# Patient Record
Sex: MALE | Race: BLACK OR AFRICAN AMERICAN | NOT HISPANIC OR LATINO | Employment: UNEMPLOYED | RURAL
[De-identification: names, ages, dates, MRNs, and addresses within clinical notes are randomized per-mention and may not be internally consistent; named-entity substitution may affect disease eponyms.]

---

## 2017-05-30 ENCOUNTER — HISTORICAL (OUTPATIENT)
Dept: ADMINISTRATIVE | Facility: HOSPITAL | Age: 52
End: 2017-05-30

## 2017-05-31 LAB
LAB AP CLINICAL INFORMATION: NORMAL
LAB AP DIAGNOSIS - HISTORICAL: NORMAL
LAB AP GROSS PATHOLOGY - HISTORICAL: NORMAL
LAB AP SPECIMEN SUBMITTED - HISTORICAL: NORMAL

## 2017-08-24 ENCOUNTER — HISTORICAL (OUTPATIENT)
Dept: ADMINISTRATIVE | Facility: HOSPITAL | Age: 52
End: 2017-08-24

## 2021-12-06 ENCOUNTER — OFFICE VISIT (OUTPATIENT)
Dept: FAMILY MEDICINE | Facility: CLINIC | Age: 56
End: 2021-12-06
Payer: COMMERCIAL

## 2021-12-06 VITALS
HEIGHT: 68 IN | BODY MASS INDEX: 32.64 KG/M2 | TEMPERATURE: 98 F | WEIGHT: 215.38 LBS | SYSTOLIC BLOOD PRESSURE: 202 MMHG | DIASTOLIC BLOOD PRESSURE: 114 MMHG | OXYGEN SATURATION: 98 % | HEART RATE: 100 BPM

## 2021-12-06 DIAGNOSIS — Z83.3 FAMILY HISTORY OF DIABETES MELLITUS IN MOTHER: ICD-10-CM

## 2021-12-06 DIAGNOSIS — I10 PRIMARY HYPERTENSION: Primary | ICD-10-CM

## 2021-12-06 DIAGNOSIS — Z86.19 HX OF HEPATITIS C: Chronic | ICD-10-CM

## 2021-12-06 DIAGNOSIS — Z29.9 PREVENTIVE MEASURE: ICD-10-CM

## 2021-12-06 DIAGNOSIS — Z86.19 HISTORY OF HEPATITIS C: ICD-10-CM

## 2021-12-06 DIAGNOSIS — E66.9 OBESITY (BMI 30-39.9): Chronic | ICD-10-CM

## 2021-12-06 PROCEDURE — 99214 PR OFFICE/OUTPT VISIT, EST, LEVL IV, 30-39 MIN: ICD-10-PCS | Mod: GC,,, | Performed by: FAMILY MEDICINE

## 2021-12-06 PROCEDURE — 99214 OFFICE O/P EST MOD 30 MIN: CPT | Mod: GC,,, | Performed by: FAMILY MEDICINE

## 2021-12-06 RX ORDER — AMLODIPINE BESYLATE 5 MG/1
5 TABLET ORAL DAILY
Qty: 30 TABLET | Refills: 0 | Status: SHIPPED | OUTPATIENT
Start: 2021-12-06 | End: 2022-01-05

## 2021-12-06 RX ORDER — HYDROCHLOROTHIAZIDE 25 MG/1
25 TABLET ORAL DAILY
Qty: 30 TABLET | Refills: 0 | Status: SHIPPED | OUTPATIENT
Start: 2021-12-06 | End: 2021-12-27 | Stop reason: SDUPTHER

## 2021-12-06 RX ORDER — MULTIVITAMIN
1 TABLET ORAL DAILY
COMMUNITY
End: 2023-07-17 | Stop reason: SDUPTHER

## 2021-12-07 PROBLEM — E66.9 OBESITY (BMI 30-39.9): Chronic | Status: ACTIVE | Noted: 2021-12-07

## 2021-12-07 PROBLEM — Z29.9 PREVENTIVE MEASURE: Status: ACTIVE | Noted: 2021-12-07

## 2021-12-07 PROBLEM — I10 PRIMARY HYPERTENSION: Chronic | Status: ACTIVE | Noted: 2021-12-07

## 2021-12-07 PROBLEM — Z86.19 HX OF HEPATITIS C: Chronic | Status: ACTIVE | Noted: 2021-12-07

## 2021-12-13 ENCOUNTER — OFFICE VISIT (OUTPATIENT)
Dept: FAMILY MEDICINE | Facility: CLINIC | Age: 56
End: 2021-12-13
Payer: COMMERCIAL

## 2021-12-13 VITALS
OXYGEN SATURATION: 99 % | WEIGHT: 213 LBS | HEART RATE: 120 BPM | BODY MASS INDEX: 32.28 KG/M2 | RESPIRATION RATE: 20 BRPM | HEIGHT: 68 IN | TEMPERATURE: 98 F | SYSTOLIC BLOOD PRESSURE: 188 MMHG | DIASTOLIC BLOOD PRESSURE: 104 MMHG

## 2021-12-13 DIAGNOSIS — Z29.9 PREVENTIVE MEASURE: ICD-10-CM

## 2021-12-13 DIAGNOSIS — I10 PRIMARY HYPERTENSION: Primary | ICD-10-CM

## 2021-12-13 DIAGNOSIS — E78.00 HYPERCHOLESTEROLEMIA: ICD-10-CM

## 2021-12-13 DIAGNOSIS — Z86.19 HX OF HEPATITIS C: Chronic | ICD-10-CM

## 2021-12-13 DIAGNOSIS — E66.9 OBESITY (BMI 30-39.9): Chronic | ICD-10-CM

## 2021-12-13 PROCEDURE — 99214 PR OFFICE/OUTPT VISIT, EST, LEVL IV, 30-39 MIN: ICD-10-PCS | Mod: GC,,, | Performed by: FAMILY MEDICINE

## 2021-12-13 PROCEDURE — 99214 OFFICE O/P EST MOD 30 MIN: CPT | Mod: GC,,, | Performed by: FAMILY MEDICINE

## 2021-12-13 RX ORDER — AMLODIPINE BESYLATE 10 MG/1
10 TABLET ORAL DAILY
Qty: 30 TABLET | Refills: 0 | Status: SHIPPED | OUTPATIENT
Start: 2021-12-21 | End: 2022-01-20

## 2021-12-13 RX ORDER — ATORVASTATIN CALCIUM 10 MG/1
10 TABLET, FILM COATED ORAL NIGHTLY
Qty: 30 TABLET | Refills: 0 | Status: SHIPPED | OUTPATIENT
Start: 2021-12-13 | End: 2022-01-12

## 2021-12-27 ENCOUNTER — OFFICE VISIT (OUTPATIENT)
Dept: FAMILY MEDICINE | Facility: CLINIC | Age: 56
End: 2021-12-27
Payer: COMMERCIAL

## 2021-12-27 VITALS
TEMPERATURE: 98 F | BODY MASS INDEX: 32.54 KG/M2 | WEIGHT: 214 LBS | OXYGEN SATURATION: 98 % | HEART RATE: 107 BPM | DIASTOLIC BLOOD PRESSURE: 98 MMHG | SYSTOLIC BLOOD PRESSURE: 169 MMHG

## 2021-12-27 DIAGNOSIS — Z29.9 PREVENTIVE MEASURE: ICD-10-CM

## 2021-12-27 DIAGNOSIS — I10 PRIMARY HYPERTENSION: ICD-10-CM

## 2021-12-27 DIAGNOSIS — Z86.19 HX OF HEPATITIS C: Chronic | ICD-10-CM

## 2021-12-27 DIAGNOSIS — E78.00 HYPERCHOLESTEROLEMIA: Primary | ICD-10-CM

## 2021-12-27 DIAGNOSIS — E66.9 OBESITY (BMI 30-39.9): Chronic | ICD-10-CM

## 2021-12-27 PROCEDURE — 99214 OFFICE O/P EST MOD 30 MIN: CPT | Mod: GC,,, | Performed by: FAMILY MEDICINE

## 2021-12-27 PROCEDURE — 99214 PR OFFICE/OUTPT VISIT, EST, LEVL IV, 30-39 MIN: ICD-10-PCS | Mod: GC,,, | Performed by: FAMILY MEDICINE

## 2021-12-27 RX ORDER — AMLODIPINE BESYLATE 10 MG/1
10 TABLET ORAL DAILY
Qty: 30 TABLET | Refills: 11 | Status: SHIPPED | OUTPATIENT
Start: 2022-01-07 | End: 2022-01-24 | Stop reason: SDUPTHER

## 2021-12-27 RX ORDER — ATORVASTATIN CALCIUM 20 MG/1
20 TABLET, FILM COATED ORAL DAILY
Qty: 30 TABLET | Refills: 0 | Status: SHIPPED | OUTPATIENT
Start: 2022-01-12 | End: 2022-01-24 | Stop reason: SDUPTHER

## 2021-12-27 RX ORDER — HYDROCHLOROTHIAZIDE 25 MG/1
25 TABLET ORAL DAILY
Qty: 30 TABLET | Refills: 0 | Status: SHIPPED | OUTPATIENT
Start: 2022-01-07 | End: 2022-01-24 | Stop reason: SDUPTHER

## 2022-01-24 ENCOUNTER — OFFICE VISIT (OUTPATIENT)
Dept: FAMILY MEDICINE | Facility: CLINIC | Age: 57
End: 2022-01-24
Payer: COMMERCIAL

## 2022-01-24 VITALS
HEART RATE: 103 BPM | TEMPERATURE: 98 F | RESPIRATION RATE: 20 BRPM | OXYGEN SATURATION: 98 % | WEIGHT: 215 LBS | HEIGHT: 68 IN | SYSTOLIC BLOOD PRESSURE: 179 MMHG | DIASTOLIC BLOOD PRESSURE: 95 MMHG | BODY MASS INDEX: 32.58 KG/M2

## 2022-01-24 DIAGNOSIS — E66.9 OBESITY (BMI 30-39.9): Chronic | ICD-10-CM

## 2022-01-24 DIAGNOSIS — E78.00 HYPERCHOLESTEROLEMIA: Chronic | ICD-10-CM

## 2022-01-24 DIAGNOSIS — Z29.9 PREVENTIVE MEASURE: ICD-10-CM

## 2022-01-24 DIAGNOSIS — I10 PRIMARY HYPERTENSION: Primary | Chronic | ICD-10-CM

## 2022-01-24 PROCEDURE — 99204 PR OFFICE/OUTPT VISIT, NEW, LEVL IV, 45-59 MIN: ICD-10-PCS | Mod: GC,,, | Performed by: SPECIALIST

## 2022-01-24 PROCEDURE — 99204 OFFICE O/P NEW MOD 45 MIN: CPT | Mod: GC,,, | Performed by: SPECIALIST

## 2022-01-24 RX ORDER — ATORVASTATIN CALCIUM 20 MG/1
20 TABLET, FILM COATED ORAL DAILY
Qty: 30 TABLET | Refills: 2 | Status: SHIPPED | OUTPATIENT
Start: 2022-01-24 | End: 2022-04-18 | Stop reason: SDUPTHER

## 2022-01-24 RX ORDER — HYDROCHLOROTHIAZIDE 25 MG/1
25 TABLET ORAL DAILY
Qty: 30 TABLET | Refills: 2 | Status: SHIPPED | OUTPATIENT
Start: 2022-01-24 | End: 2022-04-18 | Stop reason: DRUGHIGH

## 2022-01-24 RX ORDER — AMLODIPINE BESYLATE 10 MG/1
10 TABLET ORAL DAILY
Qty: 30 TABLET | Refills: 2 | Status: SHIPPED | OUTPATIENT
Start: 2022-01-24 | End: 2022-04-18 | Stop reason: SDUPTHER

## 2022-01-29 NOTE — PROGRESS NOTES
Subjective:       Patient ID: Horacio Coello is a 56 y.o. male.    Chief Complaint: Follow-up (1 month)    HPI     A 57 yo AA male with past medical history obesity, hypertension and hep C infection s/p treatment 4 years ago who presents to the clinic for four-week follow up. Blood pressure measured at the office is 179/95 which was rechecked at 149/91 a few minutes later, which is consistent with home BP log which shows averaging BP of 145/85. We have checked his home blood pressure machine, which shows accurate BP measurement as compared to our machine in the office. Patient has hx of white coat syndrome and always has elevated BP measurement when coming to the office.. He is doing good and denies headache, blurry vision, chest pain, shortness of breath, abdominal pain, numbness, tingling and weakness. We increased atorvastatin to 20 mg in the last visit. We will refill atorvastain, HCTZ and amlodipine and follow up in three months.  We will repeat CMP in next visit because of elevated total protein and globulin in last lab      Current Outpatient Medications:     multivitamin (THERAGRAN) per tablet, Take 1 tablet by mouth once daily., Disp: , Rfl:     amLODIPine (NORVASC) 10 MG tablet, Take 1 tablet (10 mg total) by mouth once daily., Disp: 30 tablet, Rfl: 2    atorvastatin (LIPITOR) 20 MG tablet, Take 1 tablet (20 mg total) by mouth once daily., Disp: 30 tablet, Rfl: 2    hydroCHLOROthiazide (HYDRODIURIL) 25 MG tablet, Take 1 tablet (25 mg total) by mouth once daily., Disp: 30 tablet, Rfl: 2    Review of patient's allergies indicates:  No Known Allergies    Past Medical History:   Diagnosis Date    Hepatitis C infection 4- 6 years ago    Hypertension     Obesity        History reviewed. No pertinent surgical history.    Family History   Problem Relation Age of Onset    Diabetes Mother     Hypertension Father         Also Gout       Social History     Tobacco Use    Smoking status: Current Every Day  "Smoker     Packs/day: 1.00     Years: 20.00     Pack years: 20.00     Types: Cigarettes    Smokeless tobacco: Never Used   Substance Use Topics    Alcohol use: Not Currently       Review of Systems   Constitutional: Negative for chills, fatigue and fever.   HENT: Negative for nasal congestion, sore throat and trouble swallowing.    Eyes: Negative for pain, redness and visual disturbance.   Respiratory: Negative for cough, chest tightness and shortness of breath.    Cardiovascular: Negative for chest pain and palpitations.   Gastrointestinal: Negative for abdominal pain, blood in stool, constipation, diarrhea, nausea and vomiting.   Genitourinary: Negative for dysuria and hematuria.   Musculoskeletal: Negative for joint swelling, neck pain and neck stiffness.   Integumentary:  Negative for rash and wound.   Neurological: Negative for dizziness, vertigo, tremors, seizures, syncope and weakness.   Hematological: Negative for adenopathy.   Psychiatric/Behavioral: Negative for agitation, behavioral problems and confusion.           Objective:      Vitals:    01/24/22 1531   BP: (!) 179/95   BP Location: Right arm   Patient Position: Sitting   Pulse: 103   Resp: 20   Temp: 97.7 °F (36.5 °C)   TempSrc: Temporal   SpO2: 98%   Weight: 97.5 kg (215 lb)   Height: 5' 8" (1.727 m)     Physical Exam  Vitals and nursing note reviewed.   Constitutional:       General: He is not in acute distress.     Appearance: He is obese. He is not ill-appearing, toxic-appearing or diaphoretic.   HENT:      Head: Normocephalic.      Right Ear: External ear normal. There is no impacted cerumen.      Left Ear: External ear normal. There is no impacted cerumen.      Nose: Nose normal. No congestion or rhinorrhea.      Mouth/Throat:      Mouth: Mucous membranes are moist.      Pharynx: Oropharynx is clear. No oropharyngeal exudate or posterior oropharyngeal erythema.   Eyes:      General:         Right eye: No discharge.         Left eye: No " discharge.      Extraocular Movements: Extraocular movements intact.      Conjunctiva/sclera: Conjunctivae normal.      Pupils: Pupils are equal, round, and reactive to light.   Cardiovascular:      Rate and Rhythm: Normal rate and regular rhythm.      Pulses: Normal pulses.      Heart sounds: Normal heart sounds. No murmur heard.  No friction rub. No gallop.    Pulmonary:      Effort: Pulmonary effort is normal. No respiratory distress.      Breath sounds: No wheezing or rhonchi.   Abdominal:      Palpations: Abdomen is soft.      Tenderness: There is no abdominal tenderness. There is no guarding or rebound.   Musculoskeletal:         General: No swelling. Normal range of motion.      Cervical back: No tenderness.      Right lower leg: No edema.      Left lower leg: No edema.   Lymphadenopathy:      Cervical: No cervical adenopathy.   Skin:     General: Skin is warm.      Coloration: Skin is not jaundiced.   Neurological:      General: No focal deficit present.      Mental Status: He is alert and oriented to person, place, and time. Mental status is at baseline.   Psychiatric:         Mood and Affect: Mood normal.         Behavior: Behavior normal.         Thought Content: Thought content normal.         Judgment: Judgment normal.           Lab Results   Component Value Date    WBC 10.54 12/07/2021    HGB 17.0 12/07/2021    HCT 51.1 12/07/2021     12/07/2021    CHOL 211 (H) 12/07/2021    TRIG 130 12/07/2021    HDL 34 (L) 12/07/2021    ALT 29 12/07/2021    AST 17 12/07/2021     12/07/2021    K 4.8 12/07/2021     12/07/2021    CREATININE 1.14 12/07/2021    BUN 11 12/07/2021    CO2 31 12/07/2021    HGBA1C 5.6 12/07/2021      Assessment:       1. Primary hypertension    2. Hypercholesterolemia    3. Obesity (BMI 30-39.9)    4. Preventive measure        Plan:         Problem List Items Addressed This Visit        Cardiac/Vascular    Primary hypertension - Primary (Chronic)     Refill amlodipine and  HCTZ  for 30 days with 2 refills  Continue follow lifestyle modifications  Follow up in three months         Relevant Medications    hydroCHLOROthiazide (HYDRODIURIL) 25 MG tablet    atorvastatin (LIPITOR) 20 MG tablet    amLODIPine (NORVASC) 10 MG tablet    Hypercholesterolemia     Refill atorvastain 20 mg daily for 30 days with 2 refills  Follow up in three months         Relevant Medications    atorvastatin (LIPITOR) 20 MG tablet       Endocrine    Obesity (BMI 30-39.9) (Chronic)     Continue lifestyle modification through diet changes and exercise at least 30 minutes and 5 days a week  Follow up in three month             Other    Preventive measure     Will perform required preventive test in next visit as indicated  Repeat CMP in next visit                 Follow up in about 3 months (around 4/24/2022).    Sidney Alves MD

## 2022-01-29 NOTE — ASSESSMENT & PLAN NOTE
Refill amlodipine and HCTZ  for 30 days with 2 refills  Continue follow lifestyle modifications  Follow up in three months

## 2022-01-29 NOTE — ASSESSMENT & PLAN NOTE
Continue lifestyle modification through diet changes and exercise at least 30 minutes and 5 days a week  Follow up in three month

## 2022-04-18 ENCOUNTER — OFFICE VISIT (OUTPATIENT)
Dept: FAMILY MEDICINE | Facility: CLINIC | Age: 57
End: 2022-04-18
Payer: COMMERCIAL

## 2022-04-18 VITALS
OXYGEN SATURATION: 97 % | HEIGHT: 68 IN | DIASTOLIC BLOOD PRESSURE: 105 MMHG | BODY MASS INDEX: 32.74 KG/M2 | WEIGHT: 216 LBS | TEMPERATURE: 98 F | RESPIRATION RATE: 18 BRPM | SYSTOLIC BLOOD PRESSURE: 173 MMHG | HEART RATE: 114 BPM

## 2022-04-18 DIAGNOSIS — E78.00 HYPERCHOLESTEROLEMIA: Chronic | ICD-10-CM

## 2022-04-18 DIAGNOSIS — R00.0 TACHYCARDIA: ICD-10-CM

## 2022-04-18 DIAGNOSIS — Z76.0 MEDICATION REFILL: ICD-10-CM

## 2022-04-18 DIAGNOSIS — F17.200 CURRENT SMOKER: ICD-10-CM

## 2022-04-18 DIAGNOSIS — I10 ESSENTIAL HYPERTENSION: Primary | ICD-10-CM

## 2022-04-18 DIAGNOSIS — Z71.6 ENCOUNTER FOR SMOKING CESSATION COUNSELING: ICD-10-CM

## 2022-04-18 DIAGNOSIS — I10 PRIMARY HYPERTENSION: Chronic | ICD-10-CM

## 2022-04-18 LAB
EKG 12-LEAD: NORMAL
PR INTERVAL: NORMAL
PRT AXES: NORMAL
QRS DURATION: NORMAL
QT/QTC: NORMAL
VENTRICULAR RATE: NORMAL

## 2022-04-18 PROCEDURE — 99214 OFFICE O/P EST MOD 30 MIN: CPT | Mod: ,,, | Performed by: FAMILY MEDICINE

## 2022-04-18 PROCEDURE — 93000 ELECTROCARDIOGRAM COMPLETE: CPT | Mod: ,,, | Performed by: FAMILY MEDICINE

## 2022-04-18 PROCEDURE — 99214 PR OFFICE/OUTPT VISIT, EST, LEVL IV, 30-39 MIN: ICD-10-PCS | Mod: ,,, | Performed by: FAMILY MEDICINE

## 2022-04-18 PROCEDURE — 93000 POCT EKG 12-LEAD: ICD-10-PCS | Mod: ,,, | Performed by: FAMILY MEDICINE

## 2022-04-18 RX ORDER — VARENICLINE TARTRATE 1 MG/1
1 TABLET, FILM COATED ORAL 2 TIMES DAILY
Qty: 120 TABLET | Refills: 0 | Status: SHIPPED | OUTPATIENT
Start: 2022-04-18 | End: 2022-06-17

## 2022-04-18 RX ORDER — BUPROPION HYDROCHLORIDE 150 MG/1
150 TABLET, EXTENDED RELEASE ORAL 2 TIMES DAILY
Qty: 60 TABLET | Refills: 2 | Status: SHIPPED | OUTPATIENT
Start: 2022-04-18 | End: 2022-07-18

## 2022-04-18 RX ORDER — ATORVASTATIN CALCIUM 20 MG/1
20 TABLET, FILM COATED ORAL DAILY
Qty: 90 TABLET | Refills: 3 | Status: SHIPPED | OUTPATIENT
Start: 2022-04-18 | End: 2022-05-12 | Stop reason: SDUPTHER

## 2022-04-18 RX ORDER — VARENICLINE TARTRATE 0.5 (11)-1
KIT ORAL
Qty: 1 EACH | Refills: 0 | Status: SHIPPED | OUTPATIENT
Start: 2022-04-18 | End: 2022-10-17

## 2022-04-18 RX ORDER — HYDROCHLOROTHIAZIDE 25 MG/1
25 TABLET ORAL 2 TIMES DAILY
Qty: 180 TABLET | Refills: 0 | Status: SHIPPED | OUTPATIENT
Start: 2022-04-18 | End: 2022-07-18 | Stop reason: SDUPTHER

## 2022-04-18 RX ORDER — AMLODIPINE BESYLATE 10 MG/1
10 TABLET ORAL DAILY
Qty: 90 TABLET | Refills: 3 | Status: SHIPPED | OUTPATIENT
Start: 2022-04-18 | End: 2022-05-12 | Stop reason: SDUPTHER

## 2022-04-18 NOTE — PROGRESS NOTES
Subjective:       Patient ID: Horacio Coello is a 56 y.o. male.    Chief Complaint: Hypertension (F/u HTN) and Medication Refill    Pt presents for 3 month follow up on hypertension. BP today is still elevated at 154/70 on manual recheck. He denies any CP, SOB, LE edema. I will continue him on Amlodipine 10 mg daily and increase his HCTZ from 25 mg daily to 25 mg BID. He is still smoking around 1 ppd, and is in agreement with smoking cessation today. I will start Chantix and have advised him to get nicotine replacement to use in combination with the Chantix. Patient has white coat syndrome and does seems nervious during the visit. He has an elevated HR on exam, so an EKG was ordered in office today which showed sinus tachycardia with occasional PVCs. No additional cardiac workup needed at this time. Patient will follow up in 3 months or sooner if BP remains elevated over 140/100 on home monitoring. At follow up, he should be scheduled for AAA screening and low-dose CT scan for lung cancer screening.    Hypertension  Pertinent negatives include no chest pain, neck pain, palpitations or shortness of breath.   Medication Refill  Pertinent negatives include no abdominal pain, chest pain, chills, congestion, coughing, fatigue, fever, joint swelling, nausea, neck pain, rash, sore throat, vertigo, vomiting or weakness.     Review of Systems   Constitutional: Negative for chills, fatigue and fever.   HENT: Negative for nasal congestion, sore throat and trouble swallowing.    Eyes: Negative for pain, redness and visual disturbance.   Respiratory: Negative for cough, chest tightness and shortness of breath.    Cardiovascular: Negative for chest pain and palpitations.   Gastrointestinal: Negative for abdominal pain, blood in stool, constipation, diarrhea, nausea and vomiting.   Genitourinary: Negative for dysuria and hematuria.   Musculoskeletal: Negative for joint swelling, neck pain and neck stiffness.   Integumentary:   Negative for rash and wound.   Neurological: Negative for dizziness, vertigo, tremors, seizures, syncope and weakness.   Hematological: Negative for adenopathy.   Psychiatric/Behavioral: Negative for agitation, behavioral problems and confusion.         Objective:      Physical Exam  Vitals and nursing note reviewed.   Constitutional:       General: He is not in acute distress.     Appearance: He is obese. He is not ill-appearing, toxic-appearing or diaphoretic.   HENT:      Head: Normocephalic.      Right Ear: External ear normal. There is no impacted cerumen.      Left Ear: External ear normal. There is no impacted cerumen.      Nose: Nose normal. No congestion or rhinorrhea.      Mouth/Throat:      Mouth: Mucous membranes are moist.      Pharynx: Oropharynx is clear. No oropharyngeal exudate or posterior oropharyngeal erythema.   Eyes:      General:         Right eye: No discharge.         Left eye: No discharge.      Extraocular Movements: Extraocular movements intact.      Conjunctiva/sclera: Conjunctivae normal.      Pupils: Pupils are equal, round, and reactive to light.   Cardiovascular:      Rate and Rhythm: Normal rate and regular rhythm.      Pulses: Normal pulses.      Heart sounds: Normal heart sounds. No murmur heard.    No friction rub. No gallop.   Pulmonary:      Effort: Pulmonary effort is normal. No respiratory distress.      Breath sounds: No wheezing or rhonchi.   Abdominal:      Palpations: Abdomen is soft.      Tenderness: There is no abdominal tenderness. There is no guarding or rebound.   Musculoskeletal:         General: No swelling. Normal range of motion.      Cervical back: No tenderness.      Right lower leg: No edema.      Left lower leg: No edema.   Lymphadenopathy:      Cervical: No cervical adenopathy.   Skin:     General: Skin is warm.      Coloration: Skin is not jaundiced.   Neurological:      General: No focal deficit present.      Mental Status: He is alert and oriented to  person, place, and time. Mental status is at baseline.   Psychiatric:         Mood and Affect: Mood normal.         Behavior: Behavior normal.         Thought Content: Thought content normal.         Judgment: Judgment normal.         Assessment:       Problem List Items Addressed This Visit        Cardiac/Vascular    Primary hypertension (Chronic)    Hypercholesterolemia    Relevant Medications    atorvastatin (LIPITOR) 20 MG tablet      Other Visit Diagnoses     Essential hypertension    -  Primary    Relevant Medications    amLODIPine (NORVASC) 10 MG tablet    hydroCHLOROthiazide (HYDRODIURIL) 25 MG tablet    Tachycardia        Relevant Orders    POCT EKG 12-LEAD (NOT FOR [facility] USE)    Medication refill        Encounter for smoking cessation counseling        Relevant Medications    varenicline (CHANTIX STARTING MONTH BOX) 0.5 mg (11)- 1 mg (42) tablet    varenicline (CHANTIX) 1 mg Tab          Plan:       *Essential hypertension  -     amLODIPine (NORVASC) 10 MG tablet; Take 1 tablet (10 mg total) by mouth once daily.  Dispense: 90 tablet; Refill: 3  -     hydroCHLOROthiazide (HYDRODIURIL) 25 MG tablet; Take 1 tablet (25 mg total) by mouth 2 (two) times daily.  Dispense: 180 tablet; Refill: 0    Tachycardia  -     POCT EKG 12-LEAD (NOT FOR [facility] USE)    Primary hypertension    Medication refill    Hypercholesterolemia  -     atorvastatin (LIPITOR) 20 MG tablet; Take 1 tablet (20 mg total) by mouth once daily.  Dispense: 90 tablet; Refill: 3    Encounter for smoking cessation counseling  -     varenicline (CHANTIX STARTING MONTH BOX) 0.5 mg (11)- 1 mg (42) tablet; Take one 0.5mg tab by mouth once daily X3 days,then increase to one 0.5mg tab twice daily X4 days,then increase to one 1mg tab twice daily  Dispense: 1 each; Refill: 0  -     varenicline (CHANTIX) 1 mg Tab; Take 1 tablet (1 mg total) by mouth 2 (two) times daily.  Dispense: 120 tablet; Refill: 0    *  Follow up with Dr. Alves in 3 months.

## 2022-05-12 ENCOUNTER — PATIENT MESSAGE (OUTPATIENT)
Dept: FAMILY MEDICINE | Facility: CLINIC | Age: 57
End: 2022-05-12
Payer: COMMERCIAL

## 2022-07-18 ENCOUNTER — OFFICE VISIT (OUTPATIENT)
Dept: FAMILY MEDICINE | Facility: CLINIC | Age: 57
End: 2022-07-18
Payer: COMMERCIAL

## 2022-07-18 VITALS
BODY MASS INDEX: 32.74 KG/M2 | DIASTOLIC BLOOD PRESSURE: 86 MMHG | HEIGHT: 68 IN | RESPIRATION RATE: 16 BRPM | TEMPERATURE: 99 F | OXYGEN SATURATION: 99 % | SYSTOLIC BLOOD PRESSURE: 142 MMHG | WEIGHT: 216 LBS | HEART RATE: 108 BPM

## 2022-07-18 DIAGNOSIS — Z71.6 ENCOUNTER FOR SMOKING CESSATION COUNSELING: ICD-10-CM

## 2022-07-18 DIAGNOSIS — E66.9 OBESITY (BMI 30-39.9): Chronic | ICD-10-CM

## 2022-07-18 DIAGNOSIS — E78.00 HYPERCHOLESTEROLEMIA: ICD-10-CM

## 2022-07-18 DIAGNOSIS — Z86.19 HX OF HEPATITIS C: ICD-10-CM

## 2022-07-18 DIAGNOSIS — I10 PRIMARY HYPERTENSION: Primary | Chronic | ICD-10-CM

## 2022-07-18 DIAGNOSIS — F17.200 CURRENT SMOKER: ICD-10-CM

## 2022-07-18 DIAGNOSIS — I10 ESSENTIAL HYPERTENSION: ICD-10-CM

## 2022-07-18 DIAGNOSIS — Z29.9 PREVENTIVE MEASURE: ICD-10-CM

## 2022-07-18 DIAGNOSIS — Z12.11 COLON CANCER SCREENING: ICD-10-CM

## 2022-07-18 LAB
ALBUMIN SERPL BCP-MCNC: 3.8 G/DL (ref 3.5–5)
ALBUMIN/GLOB SERPL: 0.9 {RATIO}
ALP SERPL-CCNC: 107 U/L (ref 45–115)
ALT SERPL W P-5'-P-CCNC: 29 U/L (ref 16–61)
ANION GAP SERPL CALCULATED.3IONS-SCNC: 11 MMOL/L (ref 7–16)
AST SERPL W P-5'-P-CCNC: 17 U/L (ref 15–37)
BILIRUB SERPL-MCNC: 0.3 MG/DL (ref 0–1.2)
BUN SERPL-MCNC: 11 MG/DL (ref 7–18)
BUN/CREAT SERPL: 12 (ref 6–20)
CALCIUM SERPL-MCNC: 9.6 MG/DL (ref 8.5–10.1)
CHLORIDE SERPL-SCNC: 97 MMOL/L (ref 98–107)
CO2 SERPL-SCNC: 34 MMOL/L (ref 21–32)
CREAT SERPL-MCNC: 0.89 MG/DL (ref 0.7–1.3)
CRP SERPL-MCNC: 0.64 MG/DL (ref 0–0.8)
ERYTHROCYTE [SEDIMENTATION RATE] IN BLOOD BY WESTERGREN METHOD: 37 MM/HR (ref 0–20)
GLOBULIN SER-MCNC: 4.4 G/DL (ref 2–4)
GLUCOSE SERPL-MCNC: 101 MG/DL (ref 74–106)
POTASSIUM SERPL-SCNC: 3.5 MMOL/L (ref 3.5–5.1)
PROT SERPL-MCNC: 8.2 G/DL (ref 6.4–8.2)
SODIUM SERPL-SCNC: 138 MMOL/L (ref 136–145)

## 2022-07-18 PROCEDURE — 3077F PR MOST RECENT SYSTOLIC BLOOD PRESSURE >= 140 MM HG: ICD-10-PCS | Mod: CPTII,GC,, | Performed by: FAMILY MEDICINE

## 2022-07-18 PROCEDURE — 86140 C-REACTIVE PROTEIN: ICD-10-PCS | Mod: ,,, | Performed by: CLINICAL MEDICAL LABORATORY

## 2022-07-18 PROCEDURE — 99212 PR OFFICE/OUTPT VISIT, EST, LEVL II, 10-19 MIN: ICD-10-PCS | Mod: GC,,, | Performed by: FAMILY MEDICINE

## 2022-07-18 PROCEDURE — 80053 COMPREHEN METABOLIC PANEL: CPT | Mod: ,,, | Performed by: CLINICAL MEDICAL LABORATORY

## 2022-07-18 PROCEDURE — 3008F BODY MASS INDEX DOCD: CPT | Mod: CPTII,GC,, | Performed by: FAMILY MEDICINE

## 2022-07-18 PROCEDURE — 3008F PR BODY MASS INDEX (BMI) DOCUMENTED: ICD-10-PCS | Mod: CPTII,GC,, | Performed by: FAMILY MEDICINE

## 2022-07-18 PROCEDURE — 85651 RBC SED RATE NONAUTOMATED: CPT | Mod: ,,, | Performed by: CLINICAL MEDICAL LABORATORY

## 2022-07-18 PROCEDURE — 3079F DIAST BP 80-89 MM HG: CPT | Mod: CPTII,GC,, | Performed by: FAMILY MEDICINE

## 2022-07-18 PROCEDURE — 1159F MED LIST DOCD IN RCRD: CPT | Mod: CPTII,GC,, | Performed by: FAMILY MEDICINE

## 2022-07-18 PROCEDURE — 1159F PR MEDICATION LIST DOCUMENTED IN MEDICAL RECORD: ICD-10-PCS | Mod: CPTII,GC,, | Performed by: FAMILY MEDICINE

## 2022-07-18 PROCEDURE — 3077F SYST BP >= 140 MM HG: CPT | Mod: CPTII,GC,, | Performed by: FAMILY MEDICINE

## 2022-07-18 PROCEDURE — 80053 COMPREHENSIVE METABOLIC PANEL: ICD-10-PCS | Mod: ,,, | Performed by: CLINICAL MEDICAL LABORATORY

## 2022-07-18 PROCEDURE — 85651 SEDIMENTATION RATE, AUTOMATED: ICD-10-PCS | Mod: ,,, | Performed by: CLINICAL MEDICAL LABORATORY

## 2022-07-18 PROCEDURE — 86140 C-REACTIVE PROTEIN: CPT | Mod: ,,, | Performed by: CLINICAL MEDICAL LABORATORY

## 2022-07-18 PROCEDURE — 3079F PR MOST RECENT DIASTOLIC BLOOD PRESSURE 80-89 MM HG: ICD-10-PCS | Mod: CPTII,GC,, | Performed by: FAMILY MEDICINE

## 2022-07-18 PROCEDURE — 99212 OFFICE O/P EST SF 10 MIN: CPT | Mod: GC,,, | Performed by: FAMILY MEDICINE

## 2022-07-18 RX ORDER — HYDROCHLOROTHIAZIDE 25 MG/1
25 TABLET ORAL 2 TIMES DAILY
Qty: 180 TABLET | Refills: 0 | Status: SHIPPED | OUTPATIENT
Start: 2022-07-18 | End: 2022-10-31 | Stop reason: SDUPTHER

## 2022-07-18 NOTE — ASSESSMENT & PLAN NOTE
- Continue chantix 1 mg daily  - continue smoking cessation  - Assess progression in the next visit

## 2022-07-18 NOTE — ASSESSMENT & PLAN NOTE
- Repeat CMP  - Check ESR and CRP  - Will contact patient for lab result and determine the next plan of care  - Otherwise, follow up in three months

## 2022-07-18 NOTE — ASSESSMENT & PLAN NOTE
- Current smokier, AAA US not indicated at this time  - Colon cancer referral, hx of colonic polyp which was removed in her last colonoscopy about 5 years ago  - Vaccination: Covid 19, pneumoccocal and shingle will be addressed in next viisit  - Annual eye exam referral  - order Low dose CT

## 2022-07-18 NOTE — ASSESSMENT & PLAN NOTE
- Continue with amlodipine 10 mg daily  - Continue with HCTZ 25 mg BID  - Continue salt restriction  - Check blood pressure once daily and log numbers  - Bring blood pressure log in the next visit

## 2022-07-18 NOTE — PROGRESS NOTES
Subjective:       Patient ID: Horacio Coello is a 57 y.o. male.    Chief Complaint: Hypertension (refills) and Hyperlipidemia (refills)    HPI      Patient is a 56 yo male who presents to the clinic for three-month follow up and medication refill. He denies any chest pain, palpitations, headache, dizziness, shortness of breath, abdominal pain, bloody stools or bloody urine. Medication includes amlodipine 10 mg daily and HCTZ 25 mg BID, which was increased from 25 mg daily in the last visit. Blood pressure measured at the office was 157/77 on admission, which was rechecked at 142/86 a few minutes later. Blood pressure log shows averaging systolic blood pressure of 140. Patient does have history of white coat syndrome and elevated blood pressure during clinic visit. We will continue with the current blood pressure regimen and log blood pressure numbers at home     Patient has history of hepatitis C infection which was previously treated with elevated total protein and globulin in the last CMP. We will repeat CMP and check ESR as well as CRP. Moreover, he was started on Chantix 1mg daily for smoking cessation which he has not started yet but plan to do so soon.. Patient has about 30 pack year history and is currently doing one ppd day and is dedicated to quite smoking.Otherwise, patient is doing well and has no other complaint. We will refill his medication and follow up in three months.    Preventive medicine: Patient refuses HIV screening. Pneumoccocal, shingle and covid 19 booster vaccination will be addressed in the next visit. Patient has hx of colonic polyp in his last colonoscopy about 5 years ago, and referral has been placed for colonoscopy. Finally, low dose CT has been ordered for lung cancer screening.      Current Outpatient Medications:     amLODIPine (NORVASC) 10 MG tablet, Take 1 tablet (10 mg total) by mouth once daily., Disp: 90 tablet, Rfl: 3    atorvastatin (LIPITOR) 20 MG tablet, Take 1  tablet (20 mg total) by mouth once daily., Disp: 90 tablet, Rfl: 3    multivitamin (THERAGRAN) per tablet, Take 1 tablet by mouth once daily., Disp: , Rfl:     hydroCHLOROthiazide (HYDRODIURIL) 25 MG tablet, Take 1 tablet (25 mg total) by mouth 2 (two) times daily., Disp: 180 tablet, Rfl: 0    varenicline (CHANTIX STARTING MONTH BOX) 0.5 mg (11)- 1 mg (42) tablet, Take one 0.5mg tab by mouth once daily X3 days,then increase to one 0.5mg tab twice daily X4 days,then increase to one 1mg tab twice daily (Patient not taking: Reported on 7/18/2022), Disp: 1 each, Rfl: 0    Review of patient's allergies indicates:  No Known Allergies    Past Medical History:   Diagnosis Date    Hepatitis C infection 4- 6 years ago    Hypertension     Obesity        History reviewed. No pertinent surgical history.    Family History   Problem Relation Age of Onset    Diabetes Mother     Hypertension Father         Also Gout       Social History     Tobacco Use    Smoking status: Current Every Day Smoker     Packs/day: 1.00     Years: 20.00     Pack years: 20.00     Types: Cigarettes    Smokeless tobacco: Never Used   Substance Use Topics    Alcohol use: Not Currently    Drug use: Never       Review of Systems   Constitutional: Negative for chills, fatigue and fever.   HENT: Negative for nasal congestion, sore throat and trouble swallowing.    Eyes: Negative for pain, redness and visual disturbance.   Respiratory: Negative for cough, chest tightness and shortness of breath.    Cardiovascular: Negative for chest pain and palpitations.   Gastrointestinal: Negative for abdominal pain, blood in stool, constipation, diarrhea, nausea and vomiting.   Genitourinary: Negative for dysuria and hematuria.   Musculoskeletal: Negative for joint swelling, neck pain and neck stiffness.   Integumentary:  Negative for rash and wound.   Neurological: Negative for dizziness, vertigo, tremors, seizures, syncope and weakness.   Hematological: Negative  "for adenopathy.   Psychiatric/Behavioral: Negative for agitation, behavioral problems and confusion.         Current Medications:   Medication List with Changes/Refills   Current Medications    AMLODIPINE (NORVASC) 10 MG TABLET    Take 1 tablet (10 mg total) by mouth once daily.       Start Date: 5/13/2022 End Date: 5/8/2023    ATORVASTATIN (LIPITOR) 20 MG TABLET    Take 1 tablet (20 mg total) by mouth once daily.       Start Date: 5/13/2022 End Date: 5/8/2023    MULTIVITAMIN (THERAGRAN) PER TABLET    Take 1 tablet by mouth once daily.       Start Date: --        End Date: --    VARENICLINE (CHANTIX STARTING MONTH BOX) 0.5 MG (11)- 1 MG (42) TABLET    Take one 0.5mg tab by mouth once daily X3 days,then increase to one 0.5mg tab twice daily X4 days,then increase to one 1mg tab twice daily       Start Date: 4/18/2022 End Date: --   Changed and/or Refilled Medications    Modified Medication Previous Medication    HYDROCHLOROTHIAZIDE (HYDRODIURIL) 25 MG TABLET hydroCHLOROthiazide (HYDRODIURIL) 25 MG tablet       Take 1 tablet (25 mg total) by mouth 2 (two) times daily.    Take 1 tablet (25 mg total) by mouth 2 (two) times daily.       Start Date: 7/18/2022 End Date: 10/16/2022    Start Date: 4/18/2022 End Date: 7/18/2022   Discontinued Medications    BUPROPION (WELLBUTRIN SR) 150 MG TBSR 12 HR TABLET    Take 1 tablet (150 mg total) by mouth 2 (two) times daily.       Start Date: 4/18/2022 End Date: 7/18/2022            Objective:        Vitals:    07/18/22 0839 07/18/22 0900   BP: (!) 157/77 (!) 142/86   BP Location: Left arm    Patient Position: Sitting    BP Method: Medium (Automatic)    Pulse: 108    Resp: 16    Temp: 98.5 °F (36.9 °C)    TempSrc: Oral    SpO2: 99%    Weight: 98 kg (216 lb)    Height: 5' 8" (1.727 m)        Physical Exam  Vitals and nursing note reviewed.   Constitutional:       General: He is not in acute distress.     Appearance: He is obese. He is not ill-appearing, toxic-appearing or diaphoretic. "   HENT:      Head: Normocephalic.      Right Ear: External ear normal. There is no impacted cerumen.      Left Ear: External ear normal. There is no impacted cerumen.      Nose: Nose normal. No congestion or rhinorrhea.      Mouth/Throat:      Mouth: Mucous membranes are moist.      Pharynx: Oropharynx is clear. No oropharyngeal exudate or posterior oropharyngeal erythema.   Eyes:      General:         Right eye: No discharge.         Left eye: No discharge.      Extraocular Movements: Extraocular movements intact.      Conjunctiva/sclera: Conjunctivae normal.      Pupils: Pupils are equal, round, and reactive to light.   Cardiovascular:      Rate and Rhythm: Normal rate and regular rhythm.      Pulses: Normal pulses.      Heart sounds: Normal heart sounds. No murmur heard.    No friction rub.   Pulmonary:      Effort: Pulmonary effort is normal. No respiratory distress.      Breath sounds: No wheezing, rhonchi or rales.   Abdominal:      General: Bowel sounds are normal.      Palpations: Abdomen is soft.      Tenderness: There is no abdominal tenderness. There is no guarding or rebound.   Musculoskeletal:         General: No swelling. Normal range of motion.      Cervical back: Neck supple. No tenderness.      Right lower leg: No edema.      Left lower leg: No edema.   Lymphadenopathy:      Cervical: No cervical adenopathy.   Skin:     General: Skin is warm.      Coloration: Skin is not jaundiced.   Neurological:      General: No focal deficit present.      Mental Status: He is alert and oriented to person, place, and time. Mental status is at baseline.   Psychiatric:         Mood and Affect: Mood normal.         Behavior: Behavior normal.         Thought Content: Thought content normal.         Judgment: Judgment normal.               Lab Results   Component Value Date    WBC 10.54 12/07/2021    HGB 17.0 12/07/2021    HCT 51.1 12/07/2021     12/07/2021    CHOL 211 (H) 12/07/2021    TRIG 130 12/07/2021     HDL 34 (L) 12/07/2021    ALT 29 07/18/2022    AST 17 07/18/2022     07/18/2022    K 3.5 07/18/2022    CL 97 (L) 07/18/2022    CREATININE 0.89 07/18/2022    BUN 11 07/18/2022    CO2 34 (H) 07/18/2022    HGBA1C 5.6 12/07/2021      Assessment:       1. Primary hypertension    2. Hypercholesterolemia    3. Obesity (BMI 30-39.9)    4. Current smoker    5. Preventive measure    6. Hx of hepatitis C    7. Colon cancer screening    8. Essential hypertension    9. Encounter for smoking cessation counseling        Plan:         Problem List Items Addressed This Visit        Cardiac/Vascular    Primary hypertension - Primary (Chronic)     - Continue with amlodipine 10 mg daily  - Continue with HCTZ 25 mg BID  - Continue salt restriction  - Check blood pressure once daily and log numbers  - Bring blood pressure log in the next visit           Hypercholesterolemia     - continue with atorvastain 20 mg daily  - Continue lifestyle modification              Endocrine    Obesity (BMI 30-39.9) (Chronic)     - Continue lifesyle modifications and diet change  - Exercise for 30 minutes at least 5 days a week              GI    Hx of hepatitis C (Chronic)     - Repeat CMP  - Check ESR and CRP  - Will contact patient for lab result and determine the next plan of care  - Otherwise, follow up in three months           Relevant Orders    Comprehensive Metabolic Panel (Completed)    Sedimentation Rate (Completed)    C-Reactive Protein (Completed)       Other    Preventive measure     - Current smokier, AAA US not indicated at this time  - Colon cancer referral, hx of colonic polyp which was removed in her last colonoscopy about 5 years ago  - Vaccination: Covid 19, pneumoccocal and shingle will be addressed in next viisit  - Annual eye exam referral  - order Low dose CT            Relevant Orders    Ambulatory referral/consult to Optometry    Current smoker     - Continue chantix 1 mg daily  - continue smoking cessation  - Assess  progression in the next visit             Other Visit Diagnoses     Colon cancer screening        Relevant Orders    Ambulatory referral/consult to Gastroenterology    Essential hypertension        Relevant Medications    hydroCHLOROthiazide (HYDRODIURIL) 25 MG tablet    Encounter for smoking cessation counseling                Follow up in about 3 months (around 10/18/2022).    Sidney Alves MD     Instructed patient that if symptoms fail to improve or worsen patient should seek immediate medical attention or report to the nearest emergency department. Patient expressed verbal agreement and understanding to this plan of care.

## 2022-07-18 NOTE — ASSESSMENT & PLAN NOTE
- Continue lifesyle modifications and diet change  - Exercise for 30 minutes at least 5 days a week

## 2022-08-11 ENCOUNTER — OFFICE VISIT (OUTPATIENT)
Dept: FAMILY MEDICINE | Facility: CLINIC | Age: 57
End: 2022-08-11
Payer: COMMERCIAL

## 2022-08-11 VITALS
DIASTOLIC BLOOD PRESSURE: 108 MMHG | HEIGHT: 68 IN | TEMPERATURE: 99 F | HEART RATE: 103 BPM | BODY MASS INDEX: 30.74 KG/M2 | OXYGEN SATURATION: 98 % | SYSTOLIC BLOOD PRESSURE: 170 MMHG | WEIGHT: 202.81 LBS

## 2022-08-11 DIAGNOSIS — K04.7 DENTAL INFECTION: Primary | ICD-10-CM

## 2022-08-11 DIAGNOSIS — I10 PRIMARY HYPERTENSION: Chronic | ICD-10-CM

## 2022-08-11 PROCEDURE — 3080F PR MOST RECENT DIASTOLIC BLOOD PRESSURE >= 90 MM HG: ICD-10-PCS | Mod: GC,,, | Performed by: FAMILY MEDICINE

## 2022-08-11 PROCEDURE — 3077F PR MOST RECENT SYSTOLIC BLOOD PRESSURE >= 140 MM HG: ICD-10-PCS | Mod: GC,,, | Performed by: FAMILY MEDICINE

## 2022-08-11 PROCEDURE — 3008F BODY MASS INDEX DOCD: CPT | Mod: GC,,, | Performed by: FAMILY MEDICINE

## 2022-08-11 PROCEDURE — 1159F MED LIST DOCD IN RCRD: CPT | Mod: GC,,, | Performed by: FAMILY MEDICINE

## 2022-08-11 PROCEDURE — 3077F SYST BP >= 140 MM HG: CPT | Mod: GC,,, | Performed by: FAMILY MEDICINE

## 2022-08-11 PROCEDURE — 1160F RVW MEDS BY RX/DR IN RCRD: CPT | Mod: GC,,, | Performed by: FAMILY MEDICINE

## 2022-08-11 PROCEDURE — 1160F PR REVIEW ALL MEDS BY PRESCRIBER/CLIN PHARMACIST DOCUMENTED: ICD-10-PCS | Mod: GC,,, | Performed by: FAMILY MEDICINE

## 2022-08-11 PROCEDURE — 99213 OFFICE O/P EST LOW 20 MIN: CPT | Mod: GC,,, | Performed by: FAMILY MEDICINE

## 2022-08-11 PROCEDURE — 1159F PR MEDICATION LIST DOCUMENTED IN MEDICAL RECORD: ICD-10-PCS | Mod: GC,,, | Performed by: FAMILY MEDICINE

## 2022-08-11 PROCEDURE — 3008F PR BODY MASS INDEX (BMI) DOCUMENTED: ICD-10-PCS | Mod: GC,,, | Performed by: FAMILY MEDICINE

## 2022-08-11 PROCEDURE — 99213 PR OFFICE/OUTPT VISIT, EST, LEVL III, 20-29 MIN: ICD-10-PCS | Mod: GC,,, | Performed by: FAMILY MEDICINE

## 2022-08-11 PROCEDURE — 3080F DIAST BP >= 90 MM HG: CPT | Mod: GC,,, | Performed by: FAMILY MEDICINE

## 2022-08-11 RX ORDER — CEPHALEXIN 500 MG/1
500 CAPSULE ORAL EVERY 6 HOURS
Qty: 28 CAPSULE | Refills: 0 | Status: SHIPPED | OUTPATIENT
Start: 2022-08-11 | End: 2022-08-18

## 2022-08-11 RX ORDER — KETOROLAC TROMETHAMINE 30 MG/ML
30 INJECTION, SOLUTION INTRAMUSCULAR; INTRAVENOUS
Status: SHIPPED | OUTPATIENT
Start: 2022-08-11 | End: 2022-08-14

## 2022-08-14 RX ORDER — HYDROCODONE BITARTRATE AND ACETAMINOPHEN 5; 325 MG/1; MG/1
1.5 TABLET ORAL EVERY 6 HOURS PRN
Refills: 0
Start: 2022-08-14 | End: 2022-11-21

## 2022-08-14 NOTE — ASSESSMENT & PLAN NOTE
Likely an element of pain contributing to increased BP. Keep BP log and bring to next routine visit for further BP management. Bring BP cuff to calibrate against clinic machine.

## 2022-08-14 NOTE — PROGRESS NOTES
Subjective:       Patient ID: Horacio Coello is a 57 y.o. male.    Chief Complaint: Dental Pain and Oral Swelling (RIGHT LOWER )    56yo M with PMH HTN who presents with acute complaint of right lower jaw pain and swelling.     He says he has an infected tooth that does need to come out but unable to see a dentist. Two days prior had fever of 101F but afebrile today. Pain with eating but able to drink. He does have right facial swelling associated with the infection. Pain is 7/10. There is edema and erythema inside the mouth and along the gums from infected tooth. Cheek swelling but no drainage or lymphadenopathy appreciated. Will send antibiotic for infection and rx pain medicine but patient does ultimately need to see a dentist.         Current Outpatient Medications:     amLODIPine (NORVASC) 10 MG tablet, Take 1 tablet (10 mg total) by mouth once daily., Disp: 90 tablet, Rfl: 3    atorvastatin (LIPITOR) 20 MG tablet, Take 1 tablet (20 mg total) by mouth once daily., Disp: 90 tablet, Rfl: 3    hydroCHLOROthiazide (HYDRODIURIL) 25 MG tablet, Take 1 tablet (25 mg total) by mouth 2 (two) times daily., Disp: 180 tablet, Rfl: 0    multivitamin (THERAGRAN) per tablet, Take 1 tablet by mouth once daily., Disp: , Rfl:     cephALEXin (KEFLEX) 500 MG capsule, Take 1 capsule (500 mg total) by mouth every 6 (six) hours. for 7 days, Disp: 28 capsule, Rfl: 0    varenicline (CHANTIX STARTING MONTH BOX) 0.5 mg (11)- 1 mg (42) tablet, Take one 0.5mg tab by mouth once daily X3 days,then increase to one 0.5mg tab twice daily X4 days,then increase to one 1mg tab twice daily (Patient not taking: No sig reported), Disp: 1 each, Rfl: 0  No current facility-administered medications for this visit.    Review of patient's allergies indicates:  No Known Allergies    Past Medical History:   Diagnosis Date    Hepatitis C infection 4- 6 years ago    Hypertension     Obesity        History reviewed. No pertinent surgical  history.    Family History   Problem Relation Age of Onset    Diabetes Mother     Hypertension Father         Also Gout       Social History     Tobacco Use    Smoking status: Current Every Day Smoker     Packs/day: 1.00     Years: 20.00     Pack years: 20.00     Types: Cigarettes    Smokeless tobacco: Never Used   Substance Use Topics    Alcohol use: Not Currently    Drug use: Never       Review of Systems   Constitutional: Positive for fever. Negative for chills.   HENT: Positive for dental problem. Negative for nasal congestion, rhinorrhea and sore throat.    Respiratory: Negative for cough, chest tightness and shortness of breath.    Cardiovascular: Negative for chest pain.   Gastrointestinal: Negative for abdominal pain, nausea and vomiting.   Neurological: Negative for dizziness, light-headedness and headaches.         Current Medications:   Medication List with Changes/Refills   New Medications    CEPHALEXIN (KEFLEX) 500 MG CAPSULE    Take 1 capsule (500 mg total) by mouth every 6 (six) hours. for 7 days       Start Date: 8/11/2022 End Date: 8/18/2022   Current Medications    AMLODIPINE (NORVASC) 10 MG TABLET    Take 1 tablet (10 mg total) by mouth once daily.       Start Date: 5/13/2022 End Date: 5/8/2023    ATORVASTATIN (LIPITOR) 20 MG TABLET    Take 1 tablet (20 mg total) by mouth once daily.       Start Date: 5/13/2022 End Date: 5/8/2023    HYDROCHLOROTHIAZIDE (HYDRODIURIL) 25 MG TABLET    Take 1 tablet (25 mg total) by mouth 2 (two) times daily.       Start Date: 7/18/2022 End Date: 10/16/2022    MULTIVITAMIN (THERAGRAN) PER TABLET    Take 1 tablet by mouth once daily.       Start Date: --        End Date: --    VARENICLINE (CHANTIX STARTING MONTH BOX) 0.5 MG (11)- 1 MG (42) TABLET    Take one 0.5mg tab by mouth once daily X3 days,then increase to one 0.5mg tab twice daily X4 days,then increase to one 1mg tab twice daily       Start Date: 4/18/2022 End Date: --            Objective:        Vitals:  "   08/11/22 0920 08/11/22 0921   BP: (!) 158/100 (!) 170/108   Pulse: 107 103   Temp: 98.7 °F (37.1 °C)    TempSrc: Temporal    SpO2: 98%    Weight: 92 kg (202 lb 12.8 oz)    Height: 5' 8" (1.727 m)        Physical Exam  Vitals reviewed.   Constitutional:       General: He is not in acute distress.     Appearance: Normal appearance.   HENT:      Head: Normocephalic and atraumatic.      Mouth/Throat:      Mouth: Mucous membranes are moist.      Comments: There is erythema and edema surrounding the infected tooth. Soft tissue swelling around site as well. No purulent drainage appreciated.   Eyes:      General: No scleral icterus.     Extraocular Movements: Extraocular movements intact.      Pupils: Pupils are equal, round, and reactive to light.   Cardiovascular:      Rate and Rhythm: Normal rate and regular rhythm.      Heart sounds: Normal heart sounds. No murmur heard.    No friction rub. No gallop.   Pulmonary:      Effort: Pulmonary effort is normal.      Breath sounds: Normal breath sounds. No wheezing, rhonchi or rales.   Musculoskeletal:      Cervical back: Normal range of motion and neck supple.   Lymphadenopathy:      Cervical: No cervical adenopathy.   Skin:     General: Skin is warm and dry.      Findings: No rash.   Neurological:      General: No focal deficit present.      Mental Status: He is alert and oriented to person, place, and time.   Psychiatric:         Mood and Affect: Mood normal.         Behavior: Behavior normal.               Lab Results   Component Value Date    WBC 10.54 12/07/2021    HGB 17.0 12/07/2021    HCT 51.1 12/07/2021     12/07/2021    CHOL 211 (H) 12/07/2021    TRIG 130 12/07/2021    HDL 34 (L) 12/07/2021    ALT 29 07/18/2022    AST 17 07/18/2022     07/18/2022    K 3.5 07/18/2022    CL 97 (L) 07/18/2022    CREATININE 0.89 07/18/2022    BUN 11 07/18/2022    CO2 34 (H) 07/18/2022    HGBA1C 5.6 12/07/2021      Assessment:       1. Dental infection        Plan:     "   Antibiotic and pain medicine for infection and pain. Patient to make dental appointment as soon as possible.     Problem List Items Addressed This Visit        Cardiac/Vascular    Primary hypertension (Chronic)     Likely an element of pain contributing to increased BP. Keep BP log and bring to next routine visit for further BP management. Bring BP cuff to calibrate against clinic machine.              Other Visit Diagnoses     Dental infection    -  Primary    Relevant Medications    cephALEXin (KEFLEX) 500 MG capsule    HYDROcodone-acetaminophen (NORCO) 5-325 mg per tablet            Follow up if symptoms worsen or fail to improve.    Ayala Vasquez MD     Instructed patient that if symptoms fail to improve or worsen patient should seek immediate medical attention or report to the nearest emergency department. Patient expressed verbal agreement and understanding to this plan of care.

## 2022-10-17 ENCOUNTER — OFFICE VISIT (OUTPATIENT)
Dept: FAMILY MEDICINE | Facility: CLINIC | Age: 57
End: 2022-10-17
Payer: COMMERCIAL

## 2022-10-17 VITALS
HEART RATE: 92 BPM | BODY MASS INDEX: 31.92 KG/M2 | SYSTOLIC BLOOD PRESSURE: 144 MMHG | RESPIRATION RATE: 20 BRPM | OXYGEN SATURATION: 98 % | DIASTOLIC BLOOD PRESSURE: 85 MMHG | WEIGHT: 210.63 LBS | HEIGHT: 68 IN | TEMPERATURE: 98 F

## 2022-10-17 DIAGNOSIS — Z12.11 COLON CANCER SCREENING: Primary | ICD-10-CM

## 2022-10-17 DIAGNOSIS — F17.200 CURRENT SMOKER: ICD-10-CM

## 2022-10-17 DIAGNOSIS — E78.00 HYPERCHOLESTEROLEMIA: ICD-10-CM

## 2022-10-17 DIAGNOSIS — I10 PRIMARY HYPERTENSION: Primary | Chronic | ICD-10-CM

## 2022-10-17 DIAGNOSIS — Z29.9 PREVENTIVE MEASURE: ICD-10-CM

## 2022-10-17 PROCEDURE — 0134A COVID-19, MRNA, LNP-S, BIVALENT BOOSTER, PF, 50 MCG/0.5 ML: CPT | Mod: GC,,, | Performed by: FAMILY MEDICINE

## 2022-10-17 PROCEDURE — 1159F MED LIST DOCD IN RCRD: CPT | Mod: CPTII,,, | Performed by: FAMILY MEDICINE

## 2022-10-17 PROCEDURE — 3077F PR MOST RECENT SYSTOLIC BLOOD PRESSURE >= 140 MM HG: ICD-10-PCS | Mod: CPTII,,, | Performed by: FAMILY MEDICINE

## 2022-10-17 PROCEDURE — 99212 PR OFFICE/OUTPT VISIT, EST, LEVL II, 10-19 MIN: ICD-10-PCS | Mod: 25,GC,, | Performed by: FAMILY MEDICINE

## 2022-10-17 PROCEDURE — 99406 PR TOBACCO USE CESSATION INTERMEDIATE 3-10 MINUTES: ICD-10-PCS | Mod: GC,,, | Performed by: FAMILY MEDICINE

## 2022-10-17 PROCEDURE — 99212 OFFICE O/P EST SF 10 MIN: CPT | Mod: 25,GC,, | Performed by: FAMILY MEDICINE

## 2022-10-17 PROCEDURE — 99406 BEHAV CHNG SMOKING 3-10 MIN: CPT | Mod: GC,,, | Performed by: FAMILY MEDICINE

## 2022-10-17 PROCEDURE — 91313 COVID-19, MRNA, LNP-S, BIVALENT BOOSTER, PF, 50 MCG/0.5 ML: CPT | Mod: GC,,, | Performed by: FAMILY MEDICINE

## 2022-10-17 PROCEDURE — 3079F PR MOST RECENT DIASTOLIC BLOOD PRESSURE 80-89 MM HG: ICD-10-PCS | Mod: CPTII,,, | Performed by: FAMILY MEDICINE

## 2022-10-17 PROCEDURE — 1159F PR MEDICATION LIST DOCUMENTED IN MEDICAL RECORD: ICD-10-PCS | Mod: CPTII,,, | Performed by: FAMILY MEDICINE

## 2022-10-17 PROCEDURE — 90686 FLU VACCINE (QUAD) GREATER THAN OR EQUAL TO 3YO PRESERVATIVE FREE IM: ICD-10-PCS | Mod: GC,,, | Performed by: FAMILY MEDICINE

## 2022-10-17 PROCEDURE — 90471 IMMUNIZATION ADMIN: CPT | Mod: GC,,, | Performed by: FAMILY MEDICINE

## 2022-10-17 PROCEDURE — 91313 COVID-19, MRNA, LNP-S, BIVALENT BOOSTER, PF, 50 MCG/0.5 ML: ICD-10-PCS | Mod: GC,,, | Performed by: FAMILY MEDICINE

## 2022-10-17 PROCEDURE — 0134A COVID-19, MRNA, LNP-S, BIVALENT BOOSTER, PF, 50 MCG/0.5 ML: ICD-10-PCS | Mod: GC,,, | Performed by: FAMILY MEDICINE

## 2022-10-17 PROCEDURE — 3077F SYST BP >= 140 MM HG: CPT | Mod: CPTII,,, | Performed by: FAMILY MEDICINE

## 2022-10-17 PROCEDURE — 3079F DIAST BP 80-89 MM HG: CPT | Mod: CPTII,,, | Performed by: FAMILY MEDICINE

## 2022-10-17 PROCEDURE — 90471 FLU VACCINE (QUAD) GREATER THAN OR EQUAL TO 3YO PRESERVATIVE FREE IM: ICD-10-PCS | Mod: GC,,, | Performed by: FAMILY MEDICINE

## 2022-10-17 PROCEDURE — 90686 IIV4 VACC NO PRSV 0.5 ML IM: CPT | Mod: GC,,, | Performed by: FAMILY MEDICINE

## 2022-10-17 NOTE — ASSESSMENT & PLAN NOTE
- Continue HCTZ 25 mg BID  - Continue amlodipine 10 mg qd  - Continue to monitor blood pressure  - Follow up in one month for vaccination but will reassess for blood pressure also  - Continue to check blood pressure at home and bring sheet in next visit

## 2022-10-17 NOTE — PROGRESS NOTES
Subjective:       Patient ID: Horacio Coello is a 57 y.o. male.    Chief Complaint: Follow-up and Hypertension    HPI    Patient is a 58 yo male who presents to the clinic for three-month follow up and hypertension. He denies chest pain, palpitations, headache, dizziness, fever, chills, shortness of breath,and abdominal pain.  Blood pressure measured at the office was 1144/85 which is consistent with home blood pressure log showing blood pressure of 137/80. Patient has white coat syndrome with slightly elevated bood pressure in the office. Home blood pressure machine was checked at the office a few months ago and was found to be accurate. Otherwise, patient is doing good with no other concern.    Preventive measure: Covid 19  and flu vaccine will be administered today, and patient will follow up in one month for HIV screening and pneumococcal vaccine. He rejects  shingle vaccine but will reassess in the next visit. We are still pending colonoscopy, low dose CT scan. We will put referral for dental annual visit.       Current Outpatient Medications:     amLODIPine (NORVASC) 10 MG tablet, Take 1 tablet (10 mg total) by mouth once daily., Disp: 90 tablet, Rfl: 3    atorvastatin (LIPITOR) 20 MG tablet, Take 1 tablet (20 mg total) by mouth once daily., Disp: 90 tablet, Rfl: 3    HYDROcodone-acetaminophen (NORCO) 5-325 mg per tablet, Take 1.5 tablets by mouth every 6 (six) hours as needed for Pain., Disp: , Rfl: 0    multivitamin (THERAGRAN) per tablet, Take 1 tablet by mouth once daily., Disp: , Rfl:     hydroCHLOROthiazide (HYDRODIURIL) 25 MG tablet, Take 1 tablet (25 mg total) by mouth 2 (two) times daily., Disp: 180 tablet, Rfl: 0    Review of patient's allergies indicates:  No Known Allergies    Past Medical History:   Diagnosis Date    Hepatitis C infection 4- 6 years ago    Hypertension     Obesity        History reviewed. No pertinent surgical history.    Family History   Problem Relation Age of Onset     Diabetes Mother     Hypertension Father         Also Gout       Social History     Tobacco Use    Smoking status: Every Day     Packs/day: 1.00     Years: 20.00     Pack years: 20.00     Types: Cigarettes    Smokeless tobacco: Never   Substance Use Topics    Alcohol use: Not Currently    Drug use: Never       Review of Systems   Constitutional:  Negative for chills, fatigue and fever.   HENT:  Negative for nasal congestion, sore throat and trouble swallowing.    Eyes:  Negative for pain, redness and visual disturbance.   Respiratory:  Negative for cough, chest tightness and shortness of breath.    Cardiovascular:  Negative for chest pain and palpitations.   Gastrointestinal:  Negative for abdominal pain, blood in stool, constipation, diarrhea, nausea and vomiting.   Genitourinary:  Negative for dysuria and hematuria.   Musculoskeletal:  Negative for joint swelling, neck pain and neck stiffness.   Integumentary:  Negative for rash and wound.   Neurological:  Negative for dizziness, vertigo, tremors, seizures, syncope and weakness.   Hematological:  Negative for adenopathy.   Psychiatric/Behavioral:  Negative for agitation, behavioral problems and confusion.        Current Medications:   Medication List with Changes/Refills   Current Medications    AMLODIPINE (NORVASC) 10 MG TABLET    Take 1 tablet (10 mg total) by mouth once daily.       Start Date: 8/15/2022 End Date: 8/10/2023    ATORVASTATIN (LIPITOR) 20 MG TABLET    Take 1 tablet (20 mg total) by mouth once daily.       Start Date: 5/13/2022 End Date: 5/8/2023    HYDROCHLOROTHIAZIDE (HYDRODIURIL) 25 MG TABLET    Take 1 tablet (25 mg total) by mouth 2 (two) times daily.       Start Date: 7/18/2022 End Date: 10/16/2022    HYDROCODONE-ACETAMINOPHEN (NORCO) 5-325 MG PER TABLET    Take 1.5 tablets by mouth every 6 (six) hours as needed for Pain.       Start Date: 8/14/2022 End Date: --    MULTIVITAMIN (THERAGRAN) PER TABLET    Take 1 tablet by mouth once daily.        "Start Date: --        End Date: --   Discontinued Medications    VARENICLINE (CHANTIX STARTING MONTH BOX) 0.5 MG (11)- 1 MG (42) TABLET    Take one 0.5mg tab by mouth once daily X3 days,then increase to one 0.5mg tab twice daily X4 days,then increase to one 1mg tab twice daily       Start Date: 4/18/2022 End Date: 10/17/2022            Objective:        Vitals:    10/17/22 0836   BP: (!) 144/85   BP Location: Left arm   Patient Position: Sitting   BP Method: Medium (Automatic)   Pulse: 92   Resp: 20   Temp: 98.1 °F (36.7 °C)   TempSrc: Oral   SpO2: 98%   Weight: 95.5 kg (210 lb 9.6 oz)   Height: 5' 8" (1.727 m)       Physical Exam  Vitals and nursing note reviewed.   Constitutional:       General: He is not in acute distress.     Appearance: He is obese. He is not ill-appearing, toxic-appearing or diaphoretic.   HENT:      Head: Normocephalic.      Right Ear: External ear normal. There is no impacted cerumen.      Left Ear: External ear normal. There is no impacted cerumen.      Nose: Nose normal. No congestion or rhinorrhea.      Mouth/Throat:      Mouth: Mucous membranes are moist.      Pharynx: Oropharynx is clear. No oropharyngeal exudate or posterior oropharyngeal erythema.   Eyes:      General:         Right eye: No discharge.         Left eye: No discharge.      Extraocular Movements: Extraocular movements intact.      Conjunctiva/sclera: Conjunctivae normal.      Pupils: Pupils are equal, round, and reactive to light.   Cardiovascular:      Rate and Rhythm: Normal rate and regular rhythm.      Pulses: Normal pulses.      Heart sounds: Normal heart sounds. No murmur heard.    No friction rub.   Pulmonary:      Effort: Pulmonary effort is normal. No respiratory distress.      Breath sounds: No wheezing, rhonchi or rales.   Abdominal:      General: Bowel sounds are normal.      Palpations: Abdomen is soft.      Tenderness: There is no abdominal tenderness. There is no guarding or rebound.   Musculoskeletal:    "      General: No swelling. Normal range of motion.      Cervical back: Neck supple. No tenderness.      Right lower leg: No edema.      Left lower leg: No edema.   Lymphadenopathy:      Cervical: No cervical adenopathy.   Skin:     General: Skin is warm.      Coloration: Skin is not jaundiced.   Neurological:      General: No focal deficit present.      Mental Status: He is alert and oriented to person, place, and time. Mental status is at baseline.   Psychiatric:         Mood and Affect: Mood normal.         Behavior: Behavior normal.         Thought Content: Thought content normal.         Judgment: Judgment normal.           Lab Results   Component Value Date    WBC 10.54 12/07/2021    HGB 17.0 12/07/2021    HCT 51.1 12/07/2021     12/07/2021    CHOL 211 (H) 12/07/2021    TRIG 130 12/07/2021    HDL 34 (L) 12/07/2021    ALT 29 07/18/2022    AST 17 07/18/2022     07/18/2022    K 3.5 07/18/2022    CL 97 (L) 07/18/2022    CREATININE 0.89 07/18/2022    BUN 11 07/18/2022    CO2 34 (H) 07/18/2022    HGBA1C 5.6 12/07/2021      Assessment:       1. Primary hypertension    2. Preventive measure    3. Hypercholesterolemia    4. Current smoker        Plan:         Problem List Items Addressed This Visit          Cardiac/Vascular    Primary hypertension - Primary (Chronic)     - Continue HCTZ 25 mg BID  - Continue amlodipine 10 mg qd  - Continue to monitor blood pressure  - Follow up in one month for vaccination but will reassess for blood pressure also  - Continue to check blood pressure at home and bring sheet in next visit         Hypercholesterolemia     - Continue atorvastatin 20 mg qd            Other    Preventive measure     - Received flu and covid vaccine today  - Will receive tetanus and pneumococcal in one month  - Delcinest shingles vaccination  - Will perform HIV screening next visit  - Referral for dental annual examination  - Pending low dose CT scan which was placed several months ago          Relevant Orders    COVID-19-MRNA-(PF)(Moderna Omicron) Vaccine (Completed)    Influenza - Quadrivalent *Preferred* (6 months+) (PF) (Completed)    Ambulatory referral/consult to Gastroenterology    Ambulatory referral/consult to Dentistry    Current smoker     - current smoke 1/2 pack a day from 1 pack a day  - Plan to cut to down to 1/4 pack a day by next visit  - Patient is not ready to start chantix 0.5 mg TID as previously ordered so it will be discontinued for now  - Educated on smoking cessation for 4 mintues              Follow up in about 4 weeks (around 11/14/2022).    Sidney Alves MD     Instructed patient that if symptoms fail to improve or worsen patient should seek immediate medical attention or report to the nearest emergency department. Patient expressed verbal agreement and understanding to this plan of care.

## 2022-10-17 NOTE — ASSESSMENT & PLAN NOTE
- Received flu and covid vaccine today  - Will receive tetanus and pneumococcal in one month  - Delcinest shingles vaccination  - Will perform HIV screening next visit  - Referral for dental annual examination  - Pending low dose CT scan which was placed several months ago

## 2022-10-27 ENCOUNTER — HOSPITAL ENCOUNTER (OUTPATIENT)
Dept: GASTROENTEROLOGY | Facility: HOSPITAL | Age: 57
Discharge: HOME | End: 2022-10-27
Attending: STUDENT IN AN ORGANIZED HEALTH CARE EDUCATION/TRAINING PROGRAM
Payer: COMMERCIAL

## 2022-10-27 ENCOUNTER — ANESTHESIA (OUTPATIENT)
Dept: GASTROENTEROLOGY | Facility: HOSPITAL | Age: 57
End: 2022-10-27
Payer: COMMERCIAL

## 2022-10-27 ENCOUNTER — ANESTHESIA EVENT (OUTPATIENT)
Dept: GASTROENTEROLOGY | Facility: HOSPITAL | Age: 57
End: 2022-10-27
Payer: COMMERCIAL

## 2022-10-27 VITALS
WEIGHT: 210 LBS | OXYGEN SATURATION: 99 % | OXYGEN SATURATION: 99 % | SYSTOLIC BLOOD PRESSURE: 95 MMHG | RESPIRATION RATE: 18 BRPM | HEART RATE: 79 BPM | TEMPERATURE: 97 F | HEART RATE: 88 BPM | SYSTOLIC BLOOD PRESSURE: 94 MMHG | TEMPERATURE: 97 F | DIASTOLIC BLOOD PRESSURE: 60 MMHG | HEIGHT: 68 IN | DIASTOLIC BLOOD PRESSURE: 54 MMHG | BODY MASS INDEX: 31.83 KG/M2

## 2022-10-27 DIAGNOSIS — Z12.11 COLON CANCER SCREENING: ICD-10-CM

## 2022-10-27 PROCEDURE — 27201423 OPTIME MED/SURG SUP & DEVICES STERILE SUPPLY

## 2022-10-27 PROCEDURE — 27000284 HC CANNULA NASAL: Performed by: NURSE ANESTHETIST, CERTIFIED REGISTERED

## 2022-10-27 PROCEDURE — 45385 COLONOSCOPY W/LESION REMOVAL: CPT | Mod: 33,,, | Performed by: STUDENT IN AN ORGANIZED HEALTH CARE EDUCATION/TRAINING PROGRAM

## 2022-10-27 PROCEDURE — 45385 COLONOSCOPY W/LESION REMOVAL: CPT

## 2022-10-27 PROCEDURE — D9220A PRA ANESTHESIA: Mod: ,,, | Performed by: NURSE ANESTHETIST, CERTIFIED REGISTERED

## 2022-10-27 PROCEDURE — 45390 COLONOSCOPY W/RESECTION: CPT | Mod: 59,33,, | Performed by: STUDENT IN AN ORGANIZED HEALTH CARE EDUCATION/TRAINING PROGRAM

## 2022-10-27 PROCEDURE — 45390 COLONOSCOPY W/RESECTION: CPT

## 2022-10-27 PROCEDURE — 88305 TISSUE EXAM BY PATHOLOGIST: CPT | Mod: 26,XU,, | Performed by: PATHOLOGY

## 2022-10-27 PROCEDURE — 88305 TISSUE EXAM BY PATHOLOGIST: CPT | Mod: SUR | Performed by: STUDENT IN AN ORGANIZED HEALTH CARE EDUCATION/TRAINING PROGRAM

## 2022-10-27 PROCEDURE — D9220A PRA ANESTHESIA: ICD-10-PCS | Mod: ,,, | Performed by: NURSE ANESTHETIST, CERTIFIED REGISTERED

## 2022-10-27 PROCEDURE — 37000009 HC ANESTHESIA EA ADD 15 MINS

## 2022-10-27 PROCEDURE — 45385 PR COLONOSCOPY,REMV LESN,SNARE: ICD-10-PCS | Mod: 33,,, | Performed by: STUDENT IN AN ORGANIZED HEALTH CARE EDUCATION/TRAINING PROGRAM

## 2022-10-27 PROCEDURE — 63600175 PHARM REV CODE 636 W HCPCS: Performed by: NURSE ANESTHETIST, CERTIFIED REGISTERED

## 2022-10-27 PROCEDURE — 45390: ICD-10-PCS | Mod: 59,33,, | Performed by: STUDENT IN AN ORGANIZED HEALTH CARE EDUCATION/TRAINING PROGRAM

## 2022-10-27 PROCEDURE — 37000008 HC ANESTHESIA 1ST 15 MINUTES

## 2022-10-27 PROCEDURE — 88305 SURGICAL PATHOLOGY: ICD-10-PCS | Mod: 26,,, | Performed by: PATHOLOGY

## 2022-10-27 PROCEDURE — 27000716 HC OXISENSOR PROBE, ANY SIZE: Performed by: NURSE ANESTHETIST, CERTIFIED REGISTERED

## 2022-10-27 PROCEDURE — 25000003 PHARM REV CODE 250: Performed by: NURSE ANESTHETIST, CERTIFIED REGISTERED

## 2022-10-27 RX ORDER — LIDOCAINE HYDROCHLORIDE 20 MG/ML
INJECTION, SOLUTION EPIDURAL; INFILTRATION; INTRACAUDAL; PERINEURAL
Status: DISCONTINUED | OUTPATIENT
Start: 2022-10-27 | End: 2022-10-27

## 2022-10-27 RX ORDER — SODIUM CHLORIDE 0.9 % (FLUSH) 0.9 %
10 SYRINGE (ML) INJECTION
Status: CANCELLED | OUTPATIENT
Start: 2022-10-27

## 2022-10-27 RX ORDER — ONDANSETRON 2 MG/ML
4 INJECTION INTRAMUSCULAR; INTRAVENOUS ONCE AS NEEDED
Status: CANCELLED | OUTPATIENT
Start: 2022-10-27 | End: 2034-03-25

## 2022-10-27 RX ORDER — SODIUM CHLORIDE 9 MG/ML
INJECTION, SOLUTION INTRAVENOUS CONTINUOUS
Status: CANCELLED | OUTPATIENT
Start: 2022-10-27

## 2022-10-27 RX ORDER — PROPOFOL 10 MG/ML
VIAL (ML) INTRAVENOUS
Status: DISCONTINUED | OUTPATIENT
Start: 2022-10-27 | End: 2022-10-27

## 2022-10-27 RX ORDER — PROCHLORPERAZINE EDISYLATE 5 MG/ML
5 INJECTION INTRAMUSCULAR; INTRAVENOUS ONCE AS NEEDED
Status: CANCELLED | OUTPATIENT
Start: 2022-10-27 | End: 2034-03-25

## 2022-10-27 RX ADMIN — PROPOFOL 20 MG: 10 INJECTION, EMULSION INTRAVENOUS at 09:10

## 2022-10-27 RX ADMIN — PROPOFOL 50 MG: 10 INJECTION, EMULSION INTRAVENOUS at 09:10

## 2022-10-27 RX ADMIN — PROPOFOL 40 MG: 10 INJECTION, EMULSION INTRAVENOUS at 10:10

## 2022-10-27 RX ADMIN — PROPOFOL 80 MG: 10 INJECTION, EMULSION INTRAVENOUS at 09:10

## 2022-10-27 RX ADMIN — PROPOFOL 40 MG: 10 INJECTION, EMULSION INTRAVENOUS at 09:10

## 2022-10-27 RX ADMIN — LIDOCAINE HYDROCHLORIDE 65 MG: 20 INJECTION, SOLUTION INTRAVENOUS at 09:10

## 2022-10-27 RX ADMIN — SODIUM CHLORIDE: 9 INJECTION, SOLUTION INTRAVENOUS at 09:10

## 2022-10-27 NOTE — ANESTHESIA POSTPROCEDURE EVALUATION
Anesthesia Post Evaluation    Patient: Horacio Coello    Procedure(s) Performed: * No procedures listed *    Final Anesthesia Type: general      Patient location during evaluation: GI PACU  Patient participation: Yes- Able to Participate  Level of consciousness: awake and alert  Post-procedure vital signs: reviewed and stable  Pain management: adequate  Airway patency: patent    PONV status at discharge: No PONV  Anesthetic complications: no      Cardiovascular status: blood pressure returned to baseline and hemodynamically stable  Respiratory status: spontaneous ventilation  Hydration status: euvolemic  Follow-up not needed.  Comments: Pt voices appreciation for care          Vitals Value Taken Time   /56 10/27/22 1035   Temp 36.1 °C (97 °F) 10/27/22 1001   Pulse 96 10/27/22 1036   Resp 19 10/27/22 1036   SpO2 98 % 10/27/22 1036   Vitals shown include unvalidated device data.      Event Time   Out of Recovery 10:53:07         Pain/Tia Score: Tia Score: 10 (10/27/2022 10:26 AM)

## 2022-10-27 NOTE — ANESTHESIA PREPROCEDURE EVALUATION
10/27/2022  Horacio Coello is a 57 y.o., male.    Past Medical History:   Diagnosis Date    Hepatitis C infection 4- 6 years ago    Hypertension     Obesity        History reviewed. No pertinent surgical history.    Family History   Problem Relation Age of Onset    Diabetes Mother     Hypertension Father         Also Gout       Social History     Socioeconomic History    Marital status:    Tobacco Use    Smoking status: Every Day     Packs/day: 1.00     Years: 20.00     Pack years: 20.00     Types: Cigarettes    Smokeless tobacco: Never   Substance and Sexual Activity    Alcohol use: Not Currently    Drug use: Never       Current Outpatient Medications   Medication Sig Dispense Refill    amLODIPine (NORVASC) 10 MG tablet Take 1 tablet (10 mg total) by mouth once daily. 90 tablet 3    atorvastatin (LIPITOR) 20 MG tablet Take 1 tablet (20 mg total) by mouth once daily. 90 tablet 3    hydroCHLOROthiazide (HYDRODIURIL) 25 MG tablet Take 1 tablet (25 mg total) by mouth 2 (two) times daily. 180 tablet 0    HYDROcodone-acetaminophen (NORCO) 5-325 mg per tablet Take 1.5 tablets by mouth every 6 (six) hours as needed for Pain.  0    multivitamin (THERAGRAN) per tablet Take 1 tablet by mouth once daily.       No current facility-administered medications for this encounter.       Review of patient's allergies indicates:  No Known Allergies    Pre-op Assessment    I have reviewed the Patient Summary Reports.     I have reviewed the Nursing Notes. I have reviewed the NPO Status.   I have reviewed the Medications.     Review of Systems  Anesthesia Hx:  No problems with previous Anesthesia  Denies Family Hx of Anesthesia complications.   Denies Personal Hx of Anesthesia complications.   Social:  Smoker    Hematology/Oncology:     Oncology Normal     EENT/Dental:EENT/Dental Normal   Cardiovascular:    Hypertension hyperlipidemia    Pulmonary:   COPD    Renal/:  Renal/ Normal     Hepatic/GI:   Liver Disease, Hepatitis, C    Musculoskeletal:  Musculoskeletal Normal    Neurological:  Neurology Normal    Endocrine:   Denies Diabetes.    Dermatological:  Skin Normal    Psych:  Psychiatric Normal           Physical Exam  General: Well nourished, Alert, Oriented and Cooperative    Airway:  Mouth Opening: Normal  Neck ROM: Normal ROM    Chest/Lungs:  Normal Respiratory Rate    Heart:  Rate: Normal        Anesthesia Plan  Type of Anesthesia, risks & benefits discussed:    Anesthesia Type: Gen Natural Airway, MAC  Intra-op Monitoring Plan: Standard ASA Monitors  Post Op Pain Control Plan: multimodal analgesia and IV/PO Opioids PRN  Induction:  IV  Informed Consent: Informed consent signed with the Patient and all parties understand the risks and agree with anesthesia plan.  All questions answered. Patient consented to blood products? Yes  ASA Score: 3  Day of Surgery Review of History & Physical: I have interviewed and examined the patient. I have reviewed the patient's H&P dated: There are no significant changes.     Ready For Surgery From Anesthesia Perspective.     .

## 2022-10-27 NOTE — H&P
History of Present Illness    Horacio Coello is a 57 y.o. male that  has a past medical history of Hepatitis C infection (4- 6 years ago), Hypertension, and Obesity.     On [place].  Labs reviewd.    States he was treated for HepC in the past with SVR.     Patient otherwise denies any:  - black stools  - bloody stools  - nausea  - vomiting  - diarrhea  - constipation  - abdominal pain  - family history of GI related malignancies    ROS  - 12 point review of systems is negative except as otherwise stated in HPI.    Past Medical History:   Diagnosis Date    Hepatitis C infection 4- 6 years ago    Hypertension     Obesity        No past surgical history on file.    Family History   Problem Relation Age of Onset    Diabetes Mother     Hypertension Father         Also Gout       Social History     Socioeconomic History    Marital status:    Tobacco Use    Smoking status: Every Day     Packs/day: 1.00     Years: 20.00     Pack years: 20.00     Types: Cigarettes    Smokeless tobacco: Never   Substance and Sexual Activity    Alcohol use: Not Currently    Drug use: Never       Current Outpatient Medications   Medication Sig Dispense Refill    amLODIPine (NORVASC) 10 MG tablet Take 1 tablet (10 mg total) by mouth once daily. 90 tablet 3    atorvastatin (LIPITOR) 20 MG tablet Take 1 tablet (20 mg total) by mouth once daily. 90 tablet 3    hydroCHLOROthiazide (HYDRODIURIL) 25 MG tablet Take 1 tablet (25 mg total) by mouth 2 (two) times daily. 180 tablet 0    HYDROcodone-acetaminophen (NORCO) 5-325 mg per tablet Take 1.5 tablets by mouth every 6 (six) hours as needed for Pain.  0    multivitamin (THERAGRAN) per tablet Take 1 tablet by mouth once daily.       No current facility-administered medications for this encounter.       Review of patient's allergies indicates:  No Known Allergies    Objective:  There were no vitals filed for this visit.     Constitutional:       General: no acute distress.     Appearance: Normal  appearance. Non toxic-appearing.   HENT:      Head: Normocephalic and atraumatic.      Mouth/Throat:      Pharynx: Oropharynx is clear. No posterior oropharyngeal erythema.   Eyes:      General: No scleral icterus.     Conjunctiva/sclera: Conjunctivae normal.   Cardiovascular:      Rate and Rhythm: Normal rate and regular rhythm.      Heart sounds: Normal heart sounds.   Pulmonary:      Effort: Pulmonary effort is normal.      Breath sounds: Normal breath sounds.   Abdominal:      General: Abdomen is flat. There is no distension.      Palpations: Abdomen is soft. There is no mass.      Tenderness: There is no abdominal tenderness. There is no guarding.   Musculoskeletal:         General: No swelling or deformity.      Cervical back: Normal range of motion and neck supple.   Skin:     General: Skin is warm and dry.   Neurological:      General: No focal deficit present.      Mental Status: alert and oriented to person, place, and time.     Assessment and Plan:  HepC Ab Positive  - States does not see a GI or hepatologist. States he does not wish to establish care with anyone at this time and wishes to just see his PCP for his diagnosis of cirrhosis, as he would not want another liver biopsy or transplant if needed and states he would rather die.    Proceed with:  Colonoscopy for screening for colon cancer    Alex Soliz MD  Gastroenterology

## 2022-10-27 NOTE — TRANSFER OF CARE
"Anesthesia Transfer of Care Note    Patient: Horacio Coello    Procedure(s) Performed: * No procedures listed *    Patient location: GI    Anesthesia Type: general    Transport from OR: Transported from OR on room air with adequate spontaneous ventilation. Continuous ECG monitoring in transport. Continuous SpO2 monitoring in transport    Post pain: adequate analgesia    Post assessment: no apparent anesthetic complications    Post vital signs: stable    Level of consciousness: sedated and responds to stimulation    Nausea/Vomiting: no nausea/vomiting    Complications: none    Transfer of care protocol was followedComments: Good SV continue, NAD, VSS, RTRN      Last vitals:   Visit Vitals  BP (!) 91/52 (BP Location: Right arm, Patient Position: Lying)   Pulse 85   Temp 36.1 °C (97 °F) (Skin)   Resp 20   Ht 5' 8" (1.727 m)   Wt 95.3 kg (210 lb)   SpO2 99%   BMI 31.93 kg/m²     "

## 2022-10-28 LAB
ESTROGEN SERPL-MCNC: NORMAL PG/ML
INSULIN SERPL-ACNC: NORMAL U[IU]/ML
LAB AP GROSS DESCRIPTION: NORMAL
LAB AP LABORATORY NOTES: NORMAL
T3RU NFR SERPL: NORMAL %

## 2022-11-21 ENCOUNTER — OFFICE VISIT (OUTPATIENT)
Dept: FAMILY MEDICINE | Facility: CLINIC | Age: 57
End: 2022-11-21
Payer: COMMERCIAL

## 2022-11-21 VITALS
WEIGHT: 211 LBS | DIASTOLIC BLOOD PRESSURE: 84 MMHG | BODY MASS INDEX: 31.98 KG/M2 | TEMPERATURE: 99 F | HEART RATE: 100 BPM | HEIGHT: 68 IN | RESPIRATION RATE: 18 BRPM | SYSTOLIC BLOOD PRESSURE: 144 MMHG | OXYGEN SATURATION: 97 %

## 2022-11-21 DIAGNOSIS — I10 HYPERTENSION, UNSPECIFIED TYPE: Primary | ICD-10-CM

## 2022-11-21 DIAGNOSIS — F17.200 CURRENT SMOKER: ICD-10-CM

## 2022-11-21 DIAGNOSIS — Z86.19 HISTORY OF HEPATITIS C: ICD-10-CM

## 2022-11-21 DIAGNOSIS — Z87.19 HISTORY OF CIRRHOSIS OF LIVER: ICD-10-CM

## 2022-11-21 DIAGNOSIS — Z23 PNEUMOCOCCAL VACCINE ADMINISTERED: ICD-10-CM

## 2022-11-21 DIAGNOSIS — Z23 NEED FOR TDAP VACCINATION: ICD-10-CM

## 2022-11-21 DIAGNOSIS — E78.5 HYPERLIPIDEMIA, UNSPECIFIED HYPERLIPIDEMIA TYPE: ICD-10-CM

## 2022-11-21 LAB
CHOLEST SERPL-MCNC: 145 MG/DL (ref 0–200)
CHOLEST/HDLC SERPL: 4.8 {RATIO}
HDLC SERPL-MCNC: 30 MG/DL (ref 40–60)
LDLC SERPL CALC-MCNC: 73 MG/DL
LDLC/HDLC SERPL: 2.4 {RATIO}
NONHDLC SERPL-MCNC: 115 MG/DL
TRIGL SERPL-MCNC: 209 MG/DL (ref 35–150)
VLDLC SERPL-MCNC: 42 MG/DL

## 2022-11-21 PROCEDURE — 1160F RVW MEDS BY RX/DR IN RCRD: CPT | Mod: CPTII,,, | Performed by: FAMILY MEDICINE

## 2022-11-21 PROCEDURE — 3077F SYST BP >= 140 MM HG: CPT | Mod: CPTII,,, | Performed by: FAMILY MEDICINE

## 2022-11-21 PROCEDURE — 1159F PR MEDICATION LIST DOCUMENTED IN MEDICAL RECORD: ICD-10-PCS | Mod: CPTII,,, | Performed by: FAMILY MEDICINE

## 2022-11-21 PROCEDURE — 99214 PR OFFICE/OUTPT VISIT, EST, LEVL IV, 30-39 MIN: ICD-10-PCS | Mod: 25,,, | Performed by: FAMILY MEDICINE

## 2022-11-21 PROCEDURE — 3079F PR MOST RECENT DIASTOLIC BLOOD PRESSURE 80-89 MM HG: ICD-10-PCS | Mod: CPTII,,, | Performed by: FAMILY MEDICINE

## 2022-11-21 PROCEDURE — 1160F PR REVIEW ALL MEDS BY PRESCRIBER/CLIN PHARMACIST DOCUMENTED: ICD-10-PCS | Mod: CPTII,,, | Performed by: FAMILY MEDICINE

## 2022-11-21 PROCEDURE — 90471 IMMUNIZATION ADMIN: CPT | Mod: ,,, | Performed by: FAMILY MEDICINE

## 2022-11-21 PROCEDURE — 1159F MED LIST DOCD IN RCRD: CPT | Mod: CPTII,,, | Performed by: FAMILY MEDICINE

## 2022-11-21 PROCEDURE — 87522 HCV MONITOR, COBAS AMPLICOR: ICD-10-PCS | Mod: 90,,, | Performed by: CLINICAL MEDICAL LABORATORY

## 2022-11-21 PROCEDURE — 4010F PR ACE/ARB THEARPY RXD/TAKEN: ICD-10-PCS | Mod: CPTII,,, | Performed by: FAMILY MEDICINE

## 2022-11-21 PROCEDURE — 80061 LIPID PANEL: CPT | Mod: ,,, | Performed by: CLINICAL MEDICAL LABORATORY

## 2022-11-21 PROCEDURE — 87522 HEPATITIS C REVRS TRNSCRPJ: CPT | Mod: 90,,, | Performed by: CLINICAL MEDICAL LABORATORY

## 2022-11-21 PROCEDURE — 90472 IMMUNIZATION ADMIN EACH ADD: CPT | Mod: ,,, | Performed by: FAMILY MEDICINE

## 2022-11-21 PROCEDURE — 90677 PCV20 VACCINE IM: CPT | Mod: ,,, | Performed by: FAMILY MEDICINE

## 2022-11-21 PROCEDURE — 99214 OFFICE O/P EST MOD 30 MIN: CPT | Mod: 25,,, | Performed by: FAMILY MEDICINE

## 2022-11-21 PROCEDURE — 3008F BODY MASS INDEX DOCD: CPT | Mod: CPTII,,, | Performed by: FAMILY MEDICINE

## 2022-11-21 PROCEDURE — 3008F PR BODY MASS INDEX (BMI) DOCUMENTED: ICD-10-PCS | Mod: CPTII,,, | Performed by: FAMILY MEDICINE

## 2022-11-21 PROCEDURE — 90715 TDAP VACCINE GREATER THAN OR EQUAL TO 7YO IM: ICD-10-PCS | Mod: ,,, | Performed by: FAMILY MEDICINE

## 2022-11-21 PROCEDURE — 90472 TDAP VACCINE GREATER THAN OR EQUAL TO 7YO IM: ICD-10-PCS | Mod: ,,, | Performed by: FAMILY MEDICINE

## 2022-11-21 PROCEDURE — 90471 PNEUMOCOCCAL CONJUGATE VACCINE 20-VALENT: ICD-10-PCS | Mod: ,,, | Performed by: FAMILY MEDICINE

## 2022-11-21 PROCEDURE — 3077F PR MOST RECENT SYSTOLIC BLOOD PRESSURE >= 140 MM HG: ICD-10-PCS | Mod: CPTII,,, | Performed by: FAMILY MEDICINE

## 2022-11-21 PROCEDURE — 3079F DIAST BP 80-89 MM HG: CPT | Mod: CPTII,,, | Performed by: FAMILY MEDICINE

## 2022-11-21 PROCEDURE — 90715 TDAP VACCINE 7 YRS/> IM: CPT | Mod: ,,, | Performed by: FAMILY MEDICINE

## 2022-11-21 PROCEDURE — 90677 PNEUMOCOCCAL CONJUGATE VACCINE 20-VALENT: ICD-10-PCS | Mod: ,,, | Performed by: FAMILY MEDICINE

## 2022-11-21 PROCEDURE — 4010F ACE/ARB THERAPY RXD/TAKEN: CPT | Mod: CPTII,,, | Performed by: FAMILY MEDICINE

## 2022-11-21 PROCEDURE — 80061 LIPID PANEL: ICD-10-PCS | Mod: ,,, | Performed by: CLINICAL MEDICAL LABORATORY

## 2022-11-21 RX ORDER — LOSARTAN POTASSIUM 25 MG/1
25 TABLET ORAL DAILY
Qty: 90 TABLET | Refills: 3 | Status: SHIPPED | OUTPATIENT
Start: 2022-11-21 | End: 2023-03-20

## 2022-11-21 RX ORDER — POLYETHYLENE GLYCOL 3350, SODIUM CHLORIDE, SODIUM BICARBONATE, POTASSIUM CHLORIDE 420; 11.2; 5.72; 1.48 G/4L; G/4L; G/4L; G/4L
POWDER, FOR SOLUTION ORAL
COMMUNITY
Start: 2022-10-26 | End: 2023-03-20

## 2022-11-21 RX ORDER — HYDROCODONE BITARTRATE AND ACETAMINOPHEN 7.5; 325 MG/1; MG/1
1 TABLET ORAL EVERY 6 HOURS PRN
COMMUNITY
Start: 2022-08-11 | End: 2022-11-21

## 2022-11-21 NOTE — ASSESSMENT & PLAN NOTE
-pt reports he completed tx 8 yrs ago. Currently not following with Hepatologist or GI regularly  -Will order Hep C RNA

## 2022-11-21 NOTE — ASSESSMENT & PLAN NOTE
-Start 25 Qd  -continue amlodipine 10 QD  -HCTZ 25 BID   -log BP daily at home and bring to clinic at 2 weeks follow up   -Hand-out for DASH DIET given to the patient   -Exercise 30 min x 5 per week

## 2022-11-21 NOTE — PATIENT INSTRUCTIONS
losartan and start taking 25mg daily for better blood pressure control   Cut down sodium intake  Exercise 30 min x5 per week   Cut down smoking to 1/4   Log blood pressure daily  and follow up in 2 weeks   Ultraound of liver ordered.

## 2022-11-21 NOTE — PROGRESS NOTES
Subjective:       Patient ID: Horacio Coello is a 57 y.o. male.    Chief Complaint: Follow-up (HTN,TOBACCO USE, HLD)    A 56 yo male with a pmh of HTN, HLD, current tobacco use, Hep C ( diagnosed 8 yrs ago), cirrhosis presents for follow up on BP. BP in clinic showed 144/84 with repeat. Pt took HCTZ 25 BID and amlodipine 10 QD prior to coming to the clinic. Pt did not bring his BP log but states his BP runs around 130s-140s/89  at home. Will add losartan 25 QD.     Pt states he has cut down smoking cigarettes to less than 1/2 pack per day.     Discussed about hx of Hep C. Pt states he was diagnosed with Hep C and cirrhosis 8 yrs ago from his PCP, Dr. Chew. PT states he had 6-8 weeks of tx for hep C and also had liver biopsy. Pt was previously following with Dr. Bennett, GI but lost to follow up. Pt states he has hx of alcohol abuse ( beer almost daily for 20yrs and quit drinking alcohol 20 yrs ago. Denies any complaints. Denies N/V, chest pain, sob, abdominal pain or diarrhea.      Pt saw Dr. Soliz in October, 2022 and had C-scope done showing tubular adenoma in transverse and descending colons. Repeat C-scope in 3 yrs recommended.      Pt wants to get pneumococcal vaccine and Tdap today. Will get lipid panel, Hep C RNA today. Will order US of liver.           Current Outpatient Medications:     amLODIPine (NORVASC) 10 MG tablet, Take 1 tablet (10 mg total) by mouth once daily., Disp: 90 tablet, Rfl: 3    atorvastatin (LIPITOR) 20 MG tablet, Take 1 tablet (20 mg total) by mouth once daily., Disp: 90 tablet, Rfl: 3    hydroCHLOROthiazide (HYDRODIURIL) 25 MG tablet, Take 1 tablet (25 mg total) by mouth 2 (two) times daily., Disp: 180 tablet, Rfl: 0    multivitamin (THERAGRAN) per tablet, Take 1 tablet by mouth once daily., Disp: , Rfl:     losartan (COZAAR) 25 MG tablet, Take 1 tablet (25 mg total) by mouth once daily., Disp: 90 tablet, Rfl: 3    peg-electrolyte soln (NULYTELY WITH FLAVOR PACKS) 420 gram SolR, use as  directed, Disp: , Rfl:     Review of patient's allergies indicates:  No Known Allergies    Past Medical History:   Diagnosis Date    Hepatitis C infection 4- 6 years ago    Hypertension     Obesity     Unspecified cirrhosis of liver        History reviewed. No pertinent surgical history.    Family History   Problem Relation Age of Onset    Diabetes Mother     Hypertension Father         Also Gout       Social History     Tobacco Use    Smoking status: Every Day     Packs/day: 1.00     Years: 20.00     Pack years: 20.00     Types: Cigarettes    Smokeless tobacco: Never   Substance Use Topics    Alcohol use: Not Currently    Drug use: Never       Review of Systems   Constitutional:  Negative for chills, diaphoresis and fever.   Eyes:  Negative for photophobia and visual disturbance.   Respiratory:  Negative for shortness of breath.    Cardiovascular:  Negative for chest pain.   Gastrointestinal:  Negative for abdominal pain, diarrhea and nausea.   Genitourinary:  Negative for dysuria.   Neurological:  Negative for dizziness, syncope and headaches.   Psychiatric/Behavioral:  Negative for confusion.        Current Medications:   Medication List with Changes/Refills   New Medications    LOSARTAN (COZAAR) 25 MG TABLET    Take 1 tablet (25 mg total) by mouth once daily.       Start Date: 11/21/2022End Date: --   Current Medications    AMLODIPINE (NORVASC) 10 MG TABLET    Take 1 tablet (10 mg total) by mouth once daily.       Start Date: 8/15/2022 End Date: 8/10/2023    ATORVASTATIN (LIPITOR) 20 MG TABLET    Take 1 tablet (20 mg total) by mouth once daily.       Start Date: 5/13/2022 End Date: 5/8/2023    HYDROCHLOROTHIAZIDE (HYDRODIURIL) 25 MG TABLET    Take 1 tablet (25 mg total) by mouth 2 (two) times daily.       Start Date: 11/1/2022 End Date: 1/30/2023    MULTIVITAMIN (THERAGRAN) PER TABLET    Take 1 tablet by mouth once daily.       Start Date: --        End Date: --    PEG-ELECTROLYTE SOLN (NULYTELY WITH FLAVOR  "PACKS) 420 GRAM SOLR    use as directed       Start Date: 10/26/2022End Date: --   Discontinued Medications    HYDROCODONE-ACETAMINOPHEN (NORCO) 5-325 MG PER TABLET    Take 1.5 tablets by mouth every 6 (six) hours as needed for Pain.       Start Date: 8/14/2022 End Date: 11/21/2022    HYDROCODONE-ACETAMINOPHEN (NORCO) 7.5-325 MG PER TABLET    Take 1 tablet by mouth every 6 (six) hours as needed.       Start Date: 8/11/2022 End Date: 11/21/2022            Objective:        Vitals:    11/21/22 0921 11/21/22 0922 11/21/22 0940   BP: (!) 169/101 (!) 145/85 (!) 144/84   Pulse: 105 102 100   Resp: 18     Temp: 98.5 °F (36.9 °C)     TempSrc: Temporal     SpO2: 97%     Weight: 95.7 kg (211 lb)     Height: 5' 8" (1.727 m)         Physical Exam  Constitutional:       Appearance: He is not toxic-appearing or diaphoretic.   HENT:      Head: Normocephalic and atraumatic.      Mouth/Throat:      Pharynx: Oropharynx is clear.   Cardiovascular:      Rate and Rhythm: Normal rate and regular rhythm.      Heart sounds: Murmur heard.   Pulmonary:      Effort: Pulmonary effort is normal.      Breath sounds: Normal breath sounds.   Abdominal:      General: Bowel sounds are normal.      Palpations: Abdomen is soft.   Musculoskeletal:      Right lower leg: No edema.      Left lower leg: No edema.   Skin:     General: Skin is warm.   Neurological:      General: No focal deficit present.      Mental Status: He is alert.   Psychiatric:         Mood and Affect: Mood normal.           Lab Results   Component Value Date    WBC 10.54 12/07/2021    HGB 17.0 12/07/2021    HCT 51.1 12/07/2021     12/07/2021    CHOL 211 (H) 12/07/2021    TRIG 130 12/07/2021    HDL 34 (L) 12/07/2021    ALT 29 07/18/2022    AST 17 07/18/2022     07/18/2022    K 3.5 07/18/2022    CL 97 (L) 07/18/2022    CREATININE 0.89 07/18/2022    BUN 11 07/18/2022    CO2 34 (H) 07/18/2022    HGBA1C 5.6 12/07/2021      Assessment:       1. Hypertension, unspecified type  "   2. Pneumococcal vaccine administered    3. Need for Tdap vaccination    4. Hyperlipidemia, unspecified hyperlipidemia type    5. History of hepatitis C    6. History of cirrhosis of liver    7. Current smoker          Plan:         Problem List Items Addressed This Visit          Cardiac/Vascular    Hypertension - Primary     -Start 25 Qd  -continue amlodipine 10 QD  -HCTZ 25 BID   -log BP daily at home and bring to clinic at 2 weeks follow up   -Hand-out for DASH DIET given to the patient   -Exercise 30 min x 5 per week            Relevant Medications    losartan (COZAAR) 25 MG tablet    Hyperlipidemia     -Fasting  lipid panel ordered  -Continue atorvastatin 20 QD           Relevant Orders    Lipid Panel       ID    Need for Tdap vaccination    Relevant Orders    (In Office Administered) Tdap Vaccine (Completed)    Pneumococcal vaccine administered    Relevant Orders    (In Office Administered) Pneumococcal Conjugate Vaccine (20 Valent) (IM) (Completed)       GI    History of cirrhosis of liver     -US of liver ordered for any progression/changes          Relevant Orders    US Abdomen Limited    History of hepatitis C     -pt reports he completed tx 8 yrs ago. Currently not following with Hepatologist or GI regularly  -Will order Hep C RNA            Relevant Orders    Hepatitis C Virus (HCV) RNA Detection and Quantification, RT-PCR    US Abdomen Limited       Other    Current smoker     -Continue to reduce smoking   -Follow up in 2 weeks                 Follow up in about 2 weeks (around 12/5/2022).    Wiley Nathan DO     Instructed patient that if symptoms fail to improve or worsen patient should seek immediate medical attention or report to the nearest emergency department. Patient expressed verbal agreement and understanding to this plan of care.

## 2022-11-22 NOTE — PROGRESS NOTES
I have reviewed the notes, assessments, and/or procedures performed by , I concur with her documentation of Horacio Coello.

## 2022-11-25 LAB — HCV RNA SERPL NAA+PROBE-ACNC: NORMAL IU/ML

## 2022-12-05 ENCOUNTER — OFFICE VISIT (OUTPATIENT)
Dept: FAMILY MEDICINE | Facility: CLINIC | Age: 57
End: 2022-12-05
Payer: COMMERCIAL

## 2022-12-05 VITALS
SYSTOLIC BLOOD PRESSURE: 135 MMHG | HEART RATE: 86 BPM | WEIGHT: 212.81 LBS | DIASTOLIC BLOOD PRESSURE: 82 MMHG | HEIGHT: 68 IN | OXYGEN SATURATION: 98 % | BODY MASS INDEX: 32.25 KG/M2 | RESPIRATION RATE: 20 BRPM | TEMPERATURE: 99 F

## 2022-12-05 DIAGNOSIS — F17.200 CURRENT SMOKER: ICD-10-CM

## 2022-12-05 DIAGNOSIS — I10 PRIMARY HYPERTENSION: Primary | ICD-10-CM

## 2022-12-05 DIAGNOSIS — I10 HYPERTENSION, UNSPECIFIED TYPE: ICD-10-CM

## 2022-12-05 DIAGNOSIS — E78.5 HYPERLIPIDEMIA, UNSPECIFIED HYPERLIPIDEMIA TYPE: ICD-10-CM

## 2022-12-05 DIAGNOSIS — E78.00 HYPERCHOLESTEROLEMIA: ICD-10-CM

## 2022-12-05 DIAGNOSIS — Z86.19 HX OF HEPATITIS C: ICD-10-CM

## 2022-12-05 DIAGNOSIS — Z29.9 PREVENTIVE MEASURE: ICD-10-CM

## 2022-12-05 DIAGNOSIS — Z86.19 HISTORY OF HEPATITIS C: ICD-10-CM

## 2022-12-05 LAB
ALBUMIN SERPL BCP-MCNC: 4 G/DL (ref 3.5–5)
ALBUMIN/GLOB SERPL: 0.9 {RATIO}
ALP SERPL-CCNC: 99 U/L (ref 45–115)
ALT SERPL W P-5'-P-CCNC: 24 U/L (ref 16–61)
ANION GAP SERPL CALCULATED.3IONS-SCNC: 9 MMOL/L (ref 7–16)
AST SERPL W P-5'-P-CCNC: 14 U/L (ref 15–37)
BASOPHILS # BLD AUTO: 0.04 K/UL (ref 0–0.2)
BASOPHILS NFR BLD AUTO: 0.4 % (ref 0–1)
BILIRUB SERPL-MCNC: 0.3 MG/DL (ref ?–1.2)
BUN SERPL-MCNC: 10 MG/DL (ref 7–18)
BUN/CREAT SERPL: 10 (ref 6–20)
CALCIUM SERPL-MCNC: 10 MG/DL (ref 8.5–10.1)
CHLORIDE SERPL-SCNC: 99 MMOL/L (ref 98–107)
CHOLEST SERPL-MCNC: 145 MG/DL (ref 0–200)
CHOLEST/HDLC SERPL: 4.1 {RATIO}
CO2 SERPL-SCNC: 33 MMOL/L (ref 21–32)
CREAT SERPL-MCNC: 0.99 MG/DL (ref 0.7–1.3)
DIFFERENTIAL METHOD BLD: ABNORMAL
EGFR (NO RACE VARIABLE) (RUSH/TITUS): 88 ML/MIN/1.73M²
EOSINOPHIL # BLD AUTO: 0.12 K/UL (ref 0–0.5)
EOSINOPHIL NFR BLD AUTO: 1.2 % (ref 1–4)
ERYTHROCYTE [DISTWIDTH] IN BLOOD BY AUTOMATED COUNT: 13.5 % (ref 11.5–14.5)
ERYTHROCYTE [SEDIMENTATION RATE] IN BLOOD BY WESTERGREN METHOD: 26 MM/HR (ref 0–20)
GLOBULIN SER-MCNC: 4.4 G/DL (ref 2–4)
GLUCOSE SERPL-MCNC: 90 MG/DL (ref 74–106)
HCT VFR BLD AUTO: 48.6 % (ref 40–54)
HDLC SERPL-MCNC: 35 MG/DL (ref 40–60)
HGB BLD-MCNC: 16.2 G/DL (ref 13.5–18)
IMM GRANULOCYTES # BLD AUTO: 0.03 K/UL (ref 0–0.04)
IMM GRANULOCYTES NFR BLD: 0.3 % (ref 0–0.4)
LDLC SERPL CALC-MCNC: 87 MG/DL
LDLC/HDLC SERPL: 2.5 {RATIO}
LYMPHOCYTES # BLD AUTO: 3.28 K/UL (ref 1–4.8)
LYMPHOCYTES NFR BLD AUTO: 34 % (ref 27–41)
MCH RBC QN AUTO: 31.3 PG (ref 27–31)
MCHC RBC AUTO-ENTMCNC: 33.3 G/DL (ref 32–36)
MCV RBC AUTO: 93.8 FL (ref 80–96)
MONOCYTES # BLD AUTO: 0.65 K/UL (ref 0–0.8)
MONOCYTES NFR BLD AUTO: 6.7 % (ref 2–6)
MPC BLD CALC-MCNC: 13.6 FL (ref 9.4–12.4)
NEUTROPHILS # BLD AUTO: 5.53 K/UL (ref 1.8–7.7)
NEUTROPHILS NFR BLD AUTO: 57.4 % (ref 53–65)
NONHDLC SERPL-MCNC: 110 MG/DL
NRBC # BLD AUTO: 0 X10E3/UL
NRBC, AUTO (.00): 0 %
PLATELET # BLD AUTO: 213 K/UL (ref 150–400)
PLATELET MORPHOLOGY: NORMAL
POTASSIUM SERPL-SCNC: 3.8 MMOL/L (ref 3.5–5.1)
PROT SERPL-MCNC: 8.4 G/DL (ref 6.4–8.2)
RBC # BLD AUTO: 5.18 M/UL (ref 4.6–6.2)
RBC MORPH BLD: NORMAL
SODIUM SERPL-SCNC: 137 MMOL/L (ref 136–145)
TRIGL SERPL-MCNC: 113 MG/DL (ref 35–150)
VLDLC SERPL-MCNC: 23 MG/DL
WBC # BLD AUTO: 9.65 K/UL (ref 4.5–11)

## 2022-12-05 PROCEDURE — 80061 LIPID PANEL: ICD-10-PCS | Mod: ,,, | Performed by: CLINICAL MEDICAL LABORATORY

## 2022-12-05 PROCEDURE — 80061 LIPID PANEL: CPT | Mod: ,,, | Performed by: CLINICAL MEDICAL LABORATORY

## 2022-12-05 PROCEDURE — 3075F PR MOST RECENT SYSTOLIC BLOOD PRESS GE 130-139MM HG: ICD-10-PCS | Mod: CPTII,,, | Performed by: SPECIALIST

## 2022-12-05 PROCEDURE — 3079F DIAST BP 80-89 MM HG: CPT | Mod: CPTII,,, | Performed by: SPECIALIST

## 2022-12-05 PROCEDURE — 85651 SEDIMENTATION RATE, AUTOMATED: ICD-10-PCS | Mod: ,,, | Performed by: CLINICAL MEDICAL LABORATORY

## 2022-12-05 PROCEDURE — 3079F PR MOST RECENT DIASTOLIC BLOOD PRESSURE 80-89 MM HG: ICD-10-PCS | Mod: CPTII,,, | Performed by: SPECIALIST

## 2022-12-05 PROCEDURE — 99214 OFFICE O/P EST MOD 30 MIN: CPT | Mod: ,,, | Performed by: SPECIALIST

## 2022-12-05 PROCEDURE — 1159F PR MEDICATION LIST DOCUMENTED IN MEDICAL RECORD: ICD-10-PCS | Mod: CPTII,,, | Performed by: SPECIALIST

## 2022-12-05 PROCEDURE — 4010F ACE/ARB THERAPY RXD/TAKEN: CPT | Mod: CPTII,,, | Performed by: SPECIALIST

## 2022-12-05 PROCEDURE — 1159F MED LIST DOCD IN RCRD: CPT | Mod: CPTII,,, | Performed by: SPECIALIST

## 2022-12-05 PROCEDURE — 80053 COMPREHEN METABOLIC PANEL: CPT | Mod: ,,, | Performed by: CLINICAL MEDICAL LABORATORY

## 2022-12-05 PROCEDURE — 93005 PR ELECTROCARDIOGRAM, TRACING: ICD-10-PCS | Mod: ,,, | Performed by: SPECIALIST

## 2022-12-05 PROCEDURE — 85025 COMPLETE CBC W/AUTO DIFF WBC: CPT | Mod: ,,, | Performed by: CLINICAL MEDICAL LABORATORY

## 2022-12-05 PROCEDURE — 3008F BODY MASS INDEX DOCD: CPT | Mod: CPTII,,, | Performed by: SPECIALIST

## 2022-12-05 PROCEDURE — 93005 ELECTROCARDIOGRAM TRACING: CPT | Mod: ,,, | Performed by: SPECIALIST

## 2022-12-05 PROCEDURE — 3075F SYST BP GE 130 - 139MM HG: CPT | Mod: CPTII,,, | Performed by: SPECIALIST

## 2022-12-05 PROCEDURE — 99214 PR OFFICE/OUTPT VISIT, EST, LEVL IV, 30-39 MIN: ICD-10-PCS | Mod: ,,, | Performed by: SPECIALIST

## 2022-12-05 PROCEDURE — 80053 COMPREHENSIVE METABOLIC PANEL: ICD-10-PCS | Mod: ,,, | Performed by: CLINICAL MEDICAL LABORATORY

## 2022-12-05 PROCEDURE — 85025 CBC WITH DIFFERENTIAL: ICD-10-PCS | Mod: ,,, | Performed by: CLINICAL MEDICAL LABORATORY

## 2022-12-05 PROCEDURE — 4010F PR ACE/ARB THEARPY RXD/TAKEN: ICD-10-PCS | Mod: CPTII,,, | Performed by: SPECIALIST

## 2022-12-05 PROCEDURE — 3008F PR BODY MASS INDEX (BMI) DOCUMENTED: ICD-10-PCS | Mod: CPTII,,, | Performed by: SPECIALIST

## 2022-12-05 PROCEDURE — 85651 RBC SED RATE NONAUTOMATED: CPT | Mod: ,,, | Performed by: CLINICAL MEDICAL LABORATORY

## 2022-12-05 RX ORDER — ATORVASTATIN CALCIUM 40 MG/1
40 TABLET, FILM COATED ORAL DAILY
Qty: 90 TABLET | Refills: 0 | Status: SHIPPED | OUTPATIENT
Start: 2023-01-06 | End: 2023-01-18 | Stop reason: SDUPTHER

## 2022-12-05 NOTE — ASSESSMENT & PLAN NOTE
- Continue with the current plan  - Continue to monitor blood pressure at home and bring sheet in the next visit  - Order sleep study  - Order lab CBC, CMP

## 2022-12-05 NOTE — ASSESSMENT & PLAN NOTE
- Increase Atorvastatin to 40 mg daily  - Order repeat of Lipid panel  - Follow up in three months

## 2022-12-05 NOTE — ASSESSMENT & PLAN NOTE
- ASCVD score of 26.5  - Increase Atorvastatin to 40 mg daily  - Order repeat of Lipid panel  - Follow up in three months

## 2022-12-05 NOTE — PROGRESS NOTES
Subjective:       Patient ID: Horacio Coello is a 57 y.o. male.    Chief Complaint: Follow-up, Hypertension, and Nicotine Dependence (Smoking cessation)    HPI    Patient is a 56 yo male who presents to the clinic for follow up on hypertension management. Blood pressure measured at the office on arrival was 198/93 with pulse of 115, which was later rechecked at 135/82 with pulse of 86 after a few minutes of relaxation. Patient does have history of white coat syndrome with elevated blood pressure at doctor's office. His hypertension log shows averaging blood pressure of 126/74 at home. Patient is currently on Amlodipine 10 mg daily, HCTZ 25 mg BID and Losartan 25 mg daily (which was added in the last visit). Atorvastatin will be increased from 20 mg to 40 mg daily for high intensity coverage. Of note, patient denies chest pain, palpitations, headache, dizziness, fever, chills, shortness of breath, and abdominal pain. Otherwise, patient is doing well and has no other concerns. We will perform labs:  CBC, CMP, ESR and lipid panel. Also, we will order sleep study and low dose CT. Patient will be contacted over the phone for results and follow up in three months,      Preventive measure: Up to date with Covid 19, Flu, pneumococcal and shingle vaccinations. Annual eye exam was around Nov 2, 2022 but patient could not recall the exact date. We are still pending colonoscopy and annual dental exam.      Current Outpatient Medications:     amLODIPine (NORVASC) 10 MG tablet, Take 1 tablet (10 mg total) by mouth once daily., Disp: 90 tablet, Rfl: 3    atorvastatin (LIPITOR) 20 MG tablet, Take 1 tablet (20 mg total) by mouth once daily., Disp: 90 tablet, Rfl: 3    hydroCHLOROthiazide (HYDRODIURIL) 25 MG tablet, Take 1 tablet (25 mg total) by mouth 2 (two) times daily., Disp: 180 tablet, Rfl: 0    losartan (COZAAR) 25 MG tablet, Take 1 tablet (25 mg total) by mouth once daily., Disp: 90 tablet, Rfl: 3    multivitamin  (THERAGRAN) per tablet, Take 1 tablet by mouth once daily., Disp: , Rfl:     peg-electrolyte soln (NULYTELY WITH FLAVOR PACKS) 420 gram SolR, use as directed, Disp: , Rfl:     [START ON 1/6/2023] atorvastatin (LIPITOR) 40 MG tablet, Take 1 tablet (40 mg total) by mouth once daily., Disp: 90 tablet, Rfl: 0    Review of patient's allergies indicates:  No Known Allergies    Past Medical History:   Diagnosis Date    Hepatitis C infection 4- 6 years ago    Hypertension     Obesity     Unspecified cirrhosis of liver        History reviewed. No pertinent surgical history.    Family History   Problem Relation Age of Onset    Diabetes Mother     Hypertension Father         Also Gout       Social History     Tobacco Use    Smoking status: Every Day     Packs/day: 1.00     Years: 20.00     Pack years: 20.00     Types: Cigarettes    Smokeless tobacco: Never   Substance Use Topics    Alcohol use: Not Currently    Drug use: Never       Review of Systems   Constitutional:  Negative for chills, fatigue and fever.   HENT:  Negative for nasal congestion, sore throat and trouble swallowing.    Eyes:  Negative for pain, redness and visual disturbance.   Respiratory:  Negative for cough, chest tightness and shortness of breath.    Cardiovascular:  Negative for chest pain and palpitations.   Gastrointestinal:  Negative for abdominal pain, blood in stool, constipation, diarrhea, nausea, vomiting and reflux.   Genitourinary:  Negative for hematuria.   Musculoskeletal:  Negative for neck pain and neck stiffness.   Integumentary:  Negative for rash and wound.   Neurological:  Negative for dizziness, vertigo, tremors, seizures, syncope, speech difficulty, weakness, light-headedness, numbness and headaches.   Hematological:  Negative for adenopathy.   Psychiatric/Behavioral:  Negative for agitation, behavioral problems and confusion.        Current Medications:   Medication List with Changes/Refills   New Medications    ATORVASTATIN (LIPITOR)  "40 MG TABLET    Take 1 tablet (40 mg total) by mouth once daily.       Start Date: 1/6/2023  End Date: 4/6/2023   Current Medications    AMLODIPINE (NORVASC) 10 MG TABLET    Take 1 tablet (10 mg total) by mouth once daily.       Start Date: 8/15/2022 End Date: 8/10/2023    ATORVASTATIN (LIPITOR) 20 MG TABLET    Take 1 tablet (20 mg total) by mouth once daily.       Start Date: 5/13/2022 End Date: 5/8/2023    HYDROCHLOROTHIAZIDE (HYDRODIURIL) 25 MG TABLET    Take 1 tablet (25 mg total) by mouth 2 (two) times daily.       Start Date: 11/1/2022 End Date: 1/30/2023    LOSARTAN (COZAAR) 25 MG TABLET    Take 1 tablet (25 mg total) by mouth once daily.       Start Date: 11/21/2022End Date: --    MULTIVITAMIN (THERAGRAN) PER TABLET    Take 1 tablet by mouth once daily.       Start Date: --        End Date: --    PEG-ELECTROLYTE SOLN (NULYTELY WITH FLAVOR PACKS) 420 GRAM SOLR    use as directed       Start Date: 10/26/2022End Date: --            Objective:        Vitals:    12/05/22 0849 12/05/22 0914   BP: (!) 198/93 135/82   BP Location: Right arm    Patient Position: Sitting    BP Method: Medium (Automatic)    Pulse: (!) 115 86   Resp: 20    Temp: 98.5 °F (36.9 °C)    TempSrc: Oral    SpO2: 98%    Weight: 96.5 kg (212 lb 12.8 oz)    Height: 5' 8" (1.727 m)        Physical Exam  Vitals and nursing note reviewed.   Constitutional:       General: He is not in acute distress.     Appearance: He is not ill-appearing, toxic-appearing or diaphoretic.   HENT:      Head: Normocephalic.      Right Ear: External ear normal.      Left Ear: External ear normal.      Nose: Nose normal.      Mouth/Throat:      Pharynx: No oropharyngeal exudate or posterior oropharyngeal erythema.   Eyes:      Conjunctiva/sclera: Conjunctivae normal.   Cardiovascular:      Rate and Rhythm: Normal rate and regular rhythm.      Pulses: Normal pulses.      Heart sounds: Normal heart sounds. No murmur heard.    No friction rub.   Pulmonary:      Effort: " Pulmonary effort is normal. No respiratory distress.      Breath sounds: No wheezing, rhonchi or rales.   Abdominal:      Palpations: Abdomen is soft.      Tenderness: There is no abdominal tenderness. There is no guarding or rebound.   Musculoskeletal:         General: No swelling. Normal range of motion.      Cervical back: Neck supple. No tenderness.      Right lower leg: No edema.      Left lower leg: No edema.   Lymphadenopathy:      Cervical: No cervical adenopathy.   Skin:     General: Skin is warm.      Coloration: Skin is not jaundiced.   Neurological:      Mental Status: He is alert and oriented to person, place, and time.   Psychiatric:         Mood and Affect: Mood normal.         Behavior: Behavior normal.         Thought Content: Thought content normal.         Judgment: Judgment normal.           Lab Results   Component Value Date    WBC 9.65 12/05/2022    HGB 16.2 12/05/2022    HCT 48.6 12/05/2022     12/05/2022    CHOL 145 12/05/2022    TRIG 113 12/05/2022    HDL 35 (L) 12/05/2022    ALT 24 12/05/2022    AST 14 (L) 12/05/2022     12/05/2022    K 3.8 12/05/2022    CL 99 12/05/2022    CREATININE 0.99 12/05/2022    BUN 10 12/05/2022    CO2 33 (H) 12/05/2022    HGBA1C 5.6 12/07/2021      Assessment:       1. Primary hypertension    2. Hyperlipidemia, unspecified hyperlipidemia type    3. Hx of hepatitis C    4. History of hepatitis C    5. Current smoker    6. Hypertension, unspecified type    7. Hypercholesterolemia    8. Preventive measure          Plan:         Problem List Items Addressed This Visit          Cardiac/Vascular    Primary hypertension - Primary (Chronic)    Relevant Orders    CBC Auto Differential (Completed)    Comprehensive Metabolic Panel (Completed)    POCT EKG 12-LEAD (Manually Resulted by Ordering Provider)    Polysomnogram (CPAP will be added if patient meets diagnostic criteria.)    CBC Morphology (Completed)    Hypercholesterolemia     - ASCVD score of 26.5  -  Increase Atorvastatin to 40 mg daily  - Order repeat of Lipid panel  - Follow up in three months         Hypertension     - Continue with the current plan  - Continue to monitor blood pressure at home and bring sheet in the next visit  - Order sleep study  - Order lab CBC, CMP         Relevant Orders    CBC Auto Differential (Completed)    Comprehensive Metabolic Panel (Completed)    POCT EKG 12-LEAD (Manually Resulted by Ordering Provider)    Polysomnogram (CPAP will be added if patient meets diagnostic criteria.)    CBC Morphology (Completed)    Hyperlipidemia     - Increase Atorvastatin to 40 mg daily  - Order repeat of Lipid panel  - Follow up in three months         Relevant Medications    atorvastatin (LIPITOR) 40 MG tablet (Start on 1/6/2023)    Other Relevant Orders    Lipid Panel (Completed)       GI    Hx of hepatitis C (Chronic)     - Repeat CMP and ESR  - Pending ABD US as ordered in the last visit         Relevant Orders    Comprehensive Metabolic Panel (Completed)    Sedimentation Rate (Completed)    History of hepatitis C    Relevant Orders    CBC Auto Differential (Completed)    CBC Morphology (Completed)       Other    Preventive measure     - Pending Low dose CT scan and colonoscopy         Relevant Orders    Ambulatory referral/consult to Gastroenterology    Current smoker     - Order Low dose CT         Relevant Orders    CT Chest Lung Screening Low Dose         Follow up in about 3 months (around 3/5/2023).    Sidney Alves MD     Instructed patient that if symptoms fail to improve or worsen patient should seek immediate medical attention or report to the nearest emergency department. Patient expressed verbal agreement and understanding to this plan of care.

## 2022-12-07 ENCOUNTER — PATIENT MESSAGE (OUTPATIENT)
Dept: FAMILY MEDICINE | Facility: CLINIC | Age: 57
End: 2022-12-07
Payer: COMMERCIAL

## 2022-12-08 ENCOUNTER — PATIENT MESSAGE (OUTPATIENT)
Dept: FAMILY MEDICINE | Facility: CLINIC | Age: 57
End: 2022-12-08
Payer: COMMERCIAL

## 2023-01-20 DIAGNOSIS — G47.30 SLEEP APNEA, UNSPECIFIED: Primary | ICD-10-CM

## 2023-01-24 ENCOUNTER — PROCEDURE VISIT (OUTPATIENT)
Dept: SLEEP MEDICINE | Facility: HOSPITAL | Age: 58
End: 2023-01-24
Attending: INTERNAL MEDICINE
Payer: COMMERCIAL

## 2023-01-24 DIAGNOSIS — G47.33 OBSTRUCTIVE SLEEP APNEA (ADULT) (PEDIATRIC): Primary | ICD-10-CM

## 2023-01-24 DIAGNOSIS — G47.30 SLEEP APNEA, UNSPECIFIED: ICD-10-CM

## 2023-01-24 PROCEDURE — G0399 HOME SLEEP TEST/TYPE 3 PORTA: HCPCS | Mod: PO

## 2023-01-26 ENCOUNTER — TELEPHONE (OUTPATIENT)
Dept: SLEEP MEDICINE | Facility: HOSPITAL | Age: 58
End: 2023-01-26
Payer: COMMERCIAL

## 2023-01-26 NOTE — PROCEDURES
Procedures  Home Sleep summary include in technical report.   Has been uploaded to [employer] .

## 2023-03-20 ENCOUNTER — OFFICE VISIT (OUTPATIENT)
Dept: FAMILY MEDICINE | Facility: CLINIC | Age: 58
End: 2023-03-20
Payer: COMMERCIAL

## 2023-03-20 VITALS
BODY MASS INDEX: 32.13 KG/M2 | SYSTOLIC BLOOD PRESSURE: 147 MMHG | RESPIRATION RATE: 16 BRPM | HEIGHT: 68 IN | TEMPERATURE: 98 F | OXYGEN SATURATION: 98 % | DIASTOLIC BLOOD PRESSURE: 75 MMHG | WEIGHT: 212 LBS | HEART RATE: 100 BPM

## 2023-03-20 DIAGNOSIS — I10 PRIMARY HYPERTENSION: Chronic | ICD-10-CM

## 2023-03-20 DIAGNOSIS — I10 ESSENTIAL HYPERTENSION: ICD-10-CM

## 2023-03-20 DIAGNOSIS — I10 HYPERTENSION, UNSPECIFIED TYPE: Primary | ICD-10-CM

## 2023-03-20 DIAGNOSIS — E78.5 HYPERLIPIDEMIA, UNSPECIFIED HYPERLIPIDEMIA TYPE: ICD-10-CM

## 2023-03-20 PROCEDURE — 99213 PR OFFICE/OUTPT VISIT, EST, LEVL III, 20-29 MIN: ICD-10-PCS | Mod: GC,,, | Performed by: FAMILY MEDICINE

## 2023-03-20 PROCEDURE — 3008F BODY MASS INDEX DOCD: CPT | Mod: CPTII,,, | Performed by: FAMILY MEDICINE

## 2023-03-20 PROCEDURE — 1159F PR MEDICATION LIST DOCUMENTED IN MEDICAL RECORD: ICD-10-PCS | Mod: CPTII,,, | Performed by: FAMILY MEDICINE

## 2023-03-20 PROCEDURE — 1159F MED LIST DOCD IN RCRD: CPT | Mod: CPTII,,, | Performed by: FAMILY MEDICINE

## 2023-03-20 PROCEDURE — 4010F PR ACE/ARB THEARPY RXD/TAKEN: ICD-10-PCS | Mod: CPTII,,, | Performed by: FAMILY MEDICINE

## 2023-03-20 PROCEDURE — 3077F SYST BP >= 140 MM HG: CPT | Mod: CPTII,,, | Performed by: FAMILY MEDICINE

## 2023-03-20 PROCEDURE — 3077F PR MOST RECENT SYSTOLIC BLOOD PRESSURE >= 140 MM HG: ICD-10-PCS | Mod: CPTII,,, | Performed by: FAMILY MEDICINE

## 2023-03-20 PROCEDURE — 3078F PR MOST RECENT DIASTOLIC BLOOD PRESSURE < 80 MM HG: ICD-10-PCS | Mod: CPTII,,, | Performed by: FAMILY MEDICINE

## 2023-03-20 PROCEDURE — 3078F DIAST BP <80 MM HG: CPT | Mod: CPTII,,, | Performed by: FAMILY MEDICINE

## 2023-03-20 PROCEDURE — 4010F ACE/ARB THERAPY RXD/TAKEN: CPT | Mod: CPTII,,, | Performed by: FAMILY MEDICINE

## 2023-03-20 PROCEDURE — 99213 OFFICE O/P EST LOW 20 MIN: CPT | Mod: GC,,, | Performed by: FAMILY MEDICINE

## 2023-03-20 PROCEDURE — 3008F PR BODY MASS INDEX (BMI) DOCUMENTED: ICD-10-PCS | Mod: CPTII,,, | Performed by: FAMILY MEDICINE

## 2023-03-20 RX ORDER — HYDROCHLOROTHIAZIDE 25 MG/1
25 TABLET ORAL 2 TIMES DAILY
Qty: 180 TABLET | Refills: 0 | Status: SHIPPED | OUTPATIENT
Start: 2023-03-20 | End: 2023-04-17 | Stop reason: SDUPTHER

## 2023-03-20 RX ORDER — LOSARTAN POTASSIUM 50 MG/1
50 TABLET ORAL DAILY
Start: 2023-03-20 | End: 2023-04-17 | Stop reason: SDUPTHER

## 2023-03-20 NOTE — ASSESSMENT & PLAN NOTE
- Increase Losartan to 50 mg daily  - Continue to log blood pressure  - Follow up in 4 weeks and will proceed with follow up for 6 months if blood pressure is controlled  - Will need to refills medications in the next visit to make sure that patient is covered for at least 6 months    patient who does not have capacity  the semiology of her events are not epileptic.    psychiatric evaluation and stabilization now and consider further neurological work up when stable. History concerning for subacute decline - now significant confusion.  Reasonable to r/o autoimmune encephalitis  plan  1. transvaginal US  2. LP under sedation for [place] autoimmune encephalitis panel.  3. OK to dc EEG - overnight eeg normal. patient who does not have capacity  the semiology of her events are not epileptic.    psychiatric evaluation and stabilization now and consider further neurological work up when stable. History concerning for subacute decline - now significant confusion.  Reasonable to r/o autoimmune encephalitis  plan  1. transvaginal US  2. LP under sedation for [place] autoimmune encephalitis panel on CSF  3. [facility] AB panel on serum should be sent.  4. OK to dc EEG - overnight eeg normal.

## 2023-03-20 NOTE — PROGRESS NOTES
Subjective:       Patient ID: Horacio Coello is a 57 y.o. male.    Chief Complaint: Hyperlipidemia and Hypertension (Refills, states BP running 140-150 over 70ish at home)    HPI    Patient is a 58 yo male who presents to the clinic for follow up on hypertension management. Blood pressure measured at the office is 147/75 with a pulse of 100 that is consistent with his home blood pressure log which shows blood pressure of 140-150. He is currently on Amlodipine 10 mg daily, HCTZ 25 mg BID and Losartan 25 mg daily. We will increase Losartan to 50 mg daily.    Also, the patient was referred for a sleep study in the last visit and is now on CPAP at night for the past 2-3 months. He is complaint with his machine and has an upcoming follow up appointment with the pulmonologist. Otherwise, the patient is doing well and has no other complaint. We will refill his HCTZ for 90 days.    Preventive measure: Patient had colonoscopy about 4 months ago which showed three polyps in the transverse and descending colons that were removed as well as a large internal hemorrhoids. Plan to repeat the colonoscopy in three years.       Current Outpatient Medications:     amLODIPine (NORVASC) 10 MG tablet, Take 1 tablet (10 mg total) by mouth once daily., Disp: 30 tablet, Rfl: 3    atorvastatin (LIPITOR) 40 MG tablet, Take 1 tablet (40 mg total) by mouth once daily., Disp: 30 tablet, Rfl: 3    multivitamin (THERAGRAN) per tablet, Take 1 tablet by mouth once daily., Disp: , Rfl:     hydroCHLOROthiazide (HYDRODIURIL) 25 MG tablet, Take 1 tablet (25 mg total) by mouth 2 (two) times daily., Disp: 180 tablet, Rfl: 0    losartan (COZAAR) 50 MG tablet, Take 1 tablet (50 mg total) by mouth once daily., Disp: , Rfl:     Review of patient's allergies indicates:  No Known Allergies    Past Medical History:   Diagnosis Date    Hepatitis C infection 4- 6 years ago    Hypertension     Obesity     Unspecified cirrhosis of liver        History reviewed.  No pertinent surgical history.    Family History   Problem Relation Age of Onset    Diabetes Mother     Hypertension Father         Also Gout       Social History     Tobacco Use    Smoking status: Every Day     Packs/day: 1.00     Years: 20.00     Pack years: 20.00     Types: Cigarettes    Smokeless tobacco: Never   Substance Use Topics    Alcohol use: Not Currently    Drug use: Never       Review of Systems   Constitutional:  Negative for chills, fatigue and fever.   HENT:  Negative for nasal congestion, sore throat and trouble swallowing.    Eyes:  Negative for pain, redness and visual disturbance.   Respiratory:  Negative for cough, chest tightness and shortness of breath.    Cardiovascular:  Negative for chest pain and palpitations.   Gastrointestinal:  Negative for abdominal pain, blood in stool, constipation, diarrhea, nausea, vomiting and reflux.   Genitourinary:  Negative for hematuria.   Musculoskeletal:  Negative for neck pain and neck stiffness.   Integumentary:  Negative for rash and wound.   Neurological:  Negative for dizziness, vertigo, tremors, seizures, syncope, speech difficulty, weakness, light-headedness, numbness and headaches.   Hematological:  Negative for adenopathy.   Psychiatric/Behavioral:  Negative for agitation, behavioral problems and confusion.        Current Medications:   Medication List with Changes/Refills   Current Medications    AMLODIPINE (NORVASC) 10 MG TABLET    Take 1 tablet (10 mg total) by mouth once daily.       Start Date: 1/18/2023 End Date: 5/18/2023    ATORVASTATIN (LIPITOR) 40 MG TABLET    Take 1 tablet (40 mg total) by mouth once daily.       Start Date: 1/18/2023 End Date: 5/18/2023    MULTIVITAMIN (THERAGRAN) PER TABLET    Take 1 tablet by mouth once daily.       Start Date: --        End Date: --   Changed and/or Refilled Medications    Modified Medication Previous Medication    HYDROCHLOROTHIAZIDE (HYDRODIURIL) 25 MG TABLET hydroCHLOROthiazide (HYDRODIURIL) 25  "MG tablet       Take 1 tablet (25 mg total) by mouth 2 (two) times daily.    TAKE 1 TABLET BY MOUTH TWICE DAILY       Start Date: 3/20/2023 End Date: 6/18/2023    Start Date: 2/27/2023 End Date: 3/20/2023    LOSARTAN (COZAAR) 50 MG TABLET losartan (COZAAR) 25 MG tablet       Take 1 tablet (50 mg total) by mouth once daily.    Take 1 tablet (25 mg total) by mouth once daily.       Start Date: 3/20/2023 End Date: --    Start Date: 11/21/2022End Date: 3/20/2023   Discontinued Medications    PEG-ELECTROLYTE SOLN (NULYTELY WITH FLAVOR PACKS) 420 GRAM SOLR    use as directed       Start Date: 10/26/2022End Date: 3/20/2023            Objective:        Vitals:    03/20/23 0824 03/20/23 0825   BP: (!) 160/77 (!) 147/75   BP Location: Left arm Left arm   Patient Position: Sitting Sitting   BP Method: Large (Automatic) Large (Automatic)   Pulse: 100    Resp: 16    Temp: 98.3 °F (36.8 °C)    TempSrc: Oral    SpO2: 98%    Weight: 96.2 kg (212 lb)    Height: 5' 8" (1.727 m)        Physical Exam  Vitals and nursing note reviewed.   Constitutional:       General: He is not in acute distress.     Appearance: He is not ill-appearing, toxic-appearing or diaphoretic.   HENT:      Head: Normocephalic.      Right Ear: External ear normal.      Left Ear: External ear normal.      Nose: Nose normal.      Mouth/Throat:      Pharynx: No oropharyngeal exudate or posterior oropharyngeal erythema.   Eyes:      Conjunctiva/sclera: Conjunctivae normal.   Cardiovascular:      Rate and Rhythm: Normal rate and regular rhythm.      Pulses: Normal pulses.      Heart sounds: Normal heart sounds. No murmur heard.    No friction rub.   Pulmonary:      Effort: Pulmonary effort is normal. No respiratory distress.      Breath sounds: No wheezing, rhonchi or rales.   Abdominal:      Palpations: Abdomen is soft.      Tenderness: There is no abdominal tenderness. There is no guarding or rebound.   Musculoskeletal:         General: No swelling. Normal range of " motion.      Cervical back: Neck supple. No tenderness.      Right lower leg: No edema.      Left lower leg: No edema.   Lymphadenopathy:      Cervical: No cervical adenopathy.   Skin:     General: Skin is warm.      Coloration: Skin is not jaundiced.   Neurological:      Mental Status: He is alert and oriented to person, place, and time.   Psychiatric:         Mood and Affect: Mood normal.         Behavior: Behavior normal.         Thought Content: Thought content normal.         Judgment: Judgment normal.           Lab Results   Component Value Date    WBC 9.65 12/05/2022    HGB 16.2 12/05/2022    HCT 48.6 12/05/2022     12/05/2022    CHOL 145 12/05/2022    TRIG 113 12/05/2022    HDL 35 (L) 12/05/2022    ALT 24 12/05/2022    AST 14 (L) 12/05/2022     12/05/2022    K 3.8 12/05/2022    CL 99 12/05/2022    CREATININE 0.99 12/05/2022    BUN 10 12/05/2022    CO2 33 (H) 12/05/2022    HGBA1C 5.6 12/07/2021      Assessment:       1. Hypertension, unspecified type    2. Essential hypertension    3. Hyperlipidemia, unspecified hyperlipidemia type    4. Primary hypertension        Plan:         Problem List Items Addressed This Visit          Cardiac/Vascular    Primary hypertension (Chronic)     - Increase Losartan to 50 mg daily  - Continue to log blood pressure  - Follow up in 4 weeks and will proceed with follow up for 6 months if blood pressure is controlled  - Will need to refills medications in the next visit to make sure that patient is covered for at least 6 months          Hypertension - Primary    Relevant Medications    losartan (COZAAR) 50 MG tablet    Hyperlipidemia     Continue with the current plan          Other Visit Diagnoses       Essential hypertension        Relevant Medications    hydroCHLOROthiazide (HYDRODIURIL) 25 MG tablet              Follow up in about 4 weeks (around 4/17/2023).    Sidney Alves MD     Instructed patient that if symptoms fail to improve or worsen patient should seek  immediate medical attention or report to the nearest emergency department. Patient expressed verbal agreement and understanding to this plan of care.

## 2023-04-17 ENCOUNTER — OFFICE VISIT (OUTPATIENT)
Dept: FAMILY MEDICINE | Facility: CLINIC | Age: 58
End: 2023-04-17
Payer: COMMERCIAL

## 2023-04-17 VITALS
HEIGHT: 68 IN | RESPIRATION RATE: 16 BRPM | WEIGHT: 210.81 LBS | DIASTOLIC BLOOD PRESSURE: 72 MMHG | BODY MASS INDEX: 31.95 KG/M2 | HEART RATE: 88 BPM | OXYGEN SATURATION: 98 % | TEMPERATURE: 98 F | SYSTOLIC BLOOD PRESSURE: 126 MMHG

## 2023-04-17 DIAGNOSIS — I10 PRIMARY HYPERTENSION: Chronic | ICD-10-CM

## 2023-04-17 DIAGNOSIS — I10 ESSENTIAL HYPERTENSION: ICD-10-CM

## 2023-04-17 DIAGNOSIS — E78.5 HYPERLIPIDEMIA, UNSPECIFIED HYPERLIPIDEMIA TYPE: ICD-10-CM

## 2023-04-17 DIAGNOSIS — I10 HYPERTENSION, UNSPECIFIED TYPE: ICD-10-CM

## 2023-04-17 DIAGNOSIS — G47.33 OSA ON CPAP: ICD-10-CM

## 2023-04-17 DIAGNOSIS — R77.1 ELEVATED SERUM GLOBULIN LEVEL: Chronic | ICD-10-CM

## 2023-04-17 PROCEDURE — 3008F BODY MASS INDEX DOCD: CPT | Mod: CPTII,,, | Performed by: FAMILY MEDICINE

## 2023-04-17 PROCEDURE — 3008F PR BODY MASS INDEX (BMI) DOCUMENTED: ICD-10-PCS | Mod: CPTII,,, | Performed by: FAMILY MEDICINE

## 2023-04-17 PROCEDURE — 3074F PR MOST RECENT SYSTOLIC BLOOD PRESSURE < 130 MM HG: ICD-10-PCS | Mod: CPTII,,, | Performed by: FAMILY MEDICINE

## 2023-04-17 PROCEDURE — 4010F PR ACE/ARB THEARPY RXD/TAKEN: ICD-10-PCS | Mod: CPTII,,, | Performed by: FAMILY MEDICINE

## 2023-04-17 PROCEDURE — 3078F PR MOST RECENT DIASTOLIC BLOOD PRESSURE < 80 MM HG: ICD-10-PCS | Mod: CPTII,,, | Performed by: FAMILY MEDICINE

## 2023-04-17 PROCEDURE — 3074F SYST BP LT 130 MM HG: CPT | Mod: CPTII,,, | Performed by: FAMILY MEDICINE

## 2023-04-17 PROCEDURE — 99212 OFFICE O/P EST SF 10 MIN: CPT | Mod: ,,, | Performed by: FAMILY MEDICINE

## 2023-04-17 PROCEDURE — 3078F DIAST BP <80 MM HG: CPT | Mod: CPTII,,, | Performed by: FAMILY MEDICINE

## 2023-04-17 PROCEDURE — 4010F ACE/ARB THERAPY RXD/TAKEN: CPT | Mod: CPTII,,, | Performed by: FAMILY MEDICINE

## 2023-04-17 PROCEDURE — 1159F MED LIST DOCD IN RCRD: CPT | Mod: CPTII,,, | Performed by: FAMILY MEDICINE

## 2023-04-17 PROCEDURE — 99212 PR OFFICE/OUTPT VISIT, EST, LEVL II, 10-19 MIN: ICD-10-PCS | Mod: ,,, | Performed by: FAMILY MEDICINE

## 2023-04-17 PROCEDURE — 1159F PR MEDICATION LIST DOCUMENTED IN MEDICAL RECORD: ICD-10-PCS | Mod: CPTII,,, | Performed by: FAMILY MEDICINE

## 2023-04-17 RX ORDER — ATORVASTATIN CALCIUM 40 MG/1
40 TABLET, FILM COATED ORAL DAILY
Qty: 90 TABLET | Refills: 0 | Status: SHIPPED | OUTPATIENT
Start: 2023-05-19 | End: 2023-07-17 | Stop reason: SDUPTHER

## 2023-04-17 RX ORDER — HYDROCHLOROTHIAZIDE 25 MG/1
25 TABLET ORAL 2 TIMES DAILY
Qty: 118 TABLET | Refills: 0 | Status: SHIPPED | OUTPATIENT
Start: 2023-06-19 | End: 2023-07-17 | Stop reason: SDUPTHER

## 2023-04-17 RX ORDER — LOSARTAN POTASSIUM 50 MG/1
50 TABLET ORAL DAILY
Qty: 122 TABLET | Refills: 0 | Status: SHIPPED | OUTPATIENT
Start: 2023-04-17 | End: 2023-10-20 | Stop reason: SDUPTHER

## 2023-04-17 RX ORDER — AMLODIPINE BESYLATE 10 MG/1
10 TABLET ORAL DAILY
Qty: 90 TABLET | Refills: 0 | Status: SHIPPED | OUTPATIENT
Start: 2023-05-19 | End: 2023-07-17 | Stop reason: SDUPTHER

## 2023-04-17 NOTE — PROGRESS NOTES
Subjective:       Patient ID: Horacio Coello is a 57 y.o. male.    Chief Complaint: Follow-up, Hypertension, and Hyperlipidemia    HPI    Patient is a 56 yo male who presents to the clinic for follow up on hypertension management. Patient has been on Amlodipine 10 mg and HCTZ 25 mg BID, and Losartan was increased from 25 mg to 50 mg daily in the last visit four weeks ago. Blood pressure measured at the office on arrival is 164/69 with a pulse of 97, but the patient has white coat syndrome. He noted that he has been doing very well at home under the current regimen. Home blood pressure log shows been 126/72. He denies any chest pain, shortness of breath, dizziness, headache, numbness, tingling and weakness. He is on CPAP for sleep apnea and is complaint with this machine. Patient stated that he is doing and has no other complaint or concern.     Preventive measure: Patient refused shingle vaccination.    Current Outpatient Medications:     multivitamin (THERAGRAN) per tablet, Take 1 tablet by mouth once daily., Disp: , Rfl:     [START ON 5/19/2023] amLODIPine (NORVASC) 10 MG tablet, Take 1 tablet (10 mg total) by mouth once daily., Disp: 90 tablet, Rfl: 0    [START ON 5/19/2023] atorvastatin (LIPITOR) 40 MG tablet, Take 1 tablet (40 mg total) by mouth once daily., Disp: 90 tablet, Rfl: 0    [START ON 6/19/2023] hydroCHLOROthiazide (HYDRODIURIL) 25 MG tablet, Take 1 tablet (25 mg total) by mouth 2 (two) times daily., Disp: 118 tablet, Rfl: 0    losartan (COZAAR) 50 MG tablet, Take 1 tablet (50 mg total) by mouth once daily., Disp: 122 tablet, Rfl: 0    Review of patient's allergies indicates:  No Known Allergies    Past Medical History:   Diagnosis Date    Hepatitis C infection 4- 6 years ago    Hypertension     Obesity     Unspecified cirrhosis of liver        History reviewed. No pertinent surgical history.    Family History   Problem Relation Age of Onset    Diabetes Mother     Hypertension Father         Also  Gout       Social History     Tobacco Use    Smoking status: Every Day     Packs/day: 1.00     Years: 20.00     Pack years: 20.00     Types: Cigarettes    Smokeless tobacco: Never   Substance Use Topics    Alcohol use: Not Currently    Drug use: Never       Review of Systems   Constitutional:  Negative for chills, fatigue and fever.   HENT:  Negative for nasal congestion, sore throat and trouble swallowing.    Eyes:  Negative for pain, redness and visual disturbance.   Respiratory:  Negative for cough, chest tightness and shortness of breath.    Cardiovascular:  Negative for chest pain and palpitations.   Gastrointestinal:  Negative for abdominal pain, blood in stool, constipation, diarrhea, nausea, vomiting and reflux.   Genitourinary:  Negative for hematuria.   Musculoskeletal:  Negative for neck pain and neck stiffness.   Integumentary:  Negative for rash and wound.   Neurological:  Negative for dizziness, vertigo, tremors, seizures, syncope, speech difficulty, weakness, light-headedness, numbness and headaches.   Hematological:  Negative for adenopathy.   Psychiatric/Behavioral:  Negative for agitation, behavioral problems and confusion.        Current Medications:   Medication List with Changes/Refills   Current Medications    MULTIVITAMIN (THERAGRAN) PER TABLET    Take 1 tablet by mouth once daily.       Start Date: --        End Date: --   Changed and/or Refilled Medications    Modified Medication Previous Medication    AMLODIPINE (NORVASC) 10 MG TABLET amLODIPine (NORVASC) 10 MG tablet       Take 1 tablet (10 mg total) by mouth once daily.    Take 1 tablet (10 mg total) by mouth once daily.       Start Date: 5/19/2023 End Date: 8/17/2023    Start Date: 1/18/2023 End Date: 4/17/2023    ATORVASTATIN (LIPITOR) 40 MG TABLET atorvastatin (LIPITOR) 40 MG tablet       Take 1 tablet (40 mg total) by mouth once daily.    Take 1 tablet (40 mg total) by mouth once daily.       Start Date: 5/19/2023 End Date: 8/17/2023   "  Start Date: 1/18/2023 End Date: 4/17/2023    HYDROCHLOROTHIAZIDE (HYDRODIURIL) 25 MG TABLET hydroCHLOROthiazide (HYDRODIURIL) 25 MG tablet       Take 1 tablet (25 mg total) by mouth 2 (two) times daily.    Take 1 tablet (25 mg total) by mouth 2 (two) times daily.       Start Date: 6/19/2023 End Date: 8/17/2023    Start Date: 3/20/2023 End Date: 4/17/2023    LOSARTAN (COZAAR) 50 MG TABLET losartan (COZAAR) 50 MG tablet       Take 1 tablet (50 mg total) by mouth once daily.    Take 1 tablet (50 mg total) by mouth once daily.       Start Date: 4/17/2023 End Date: 8/17/2023    Start Date: 3/20/2023 End Date: 4/17/2023            Objective:        Vitals:    04/17/23 0821 04/17/23 0831 04/17/23 0844   BP: (!) 164/69 (!) 160/70 126/72   BP Location: Left arm Left arm    Patient Position: Sitting Sitting    BP Method: Medium (Automatic) Large (Automatic)    Pulse: 97  88   Resp: 16     Temp: 97.8 °F (36.6 °C)     TempSrc: Oral     SpO2: 98%     Weight: 95.6 kg (210 lb 12.8 oz)     Height: 5' 8" (1.727 m)         Physical Exam  Vitals and nursing note reviewed.   Constitutional:       General: He is not in acute distress.     Appearance: He is not ill-appearing, toxic-appearing or diaphoretic.   HENT:      Head: Normocephalic.      Right Ear: External ear normal.      Left Ear: External ear normal.      Nose: Nose normal.      Mouth/Throat:      Pharynx: No oropharyngeal exudate or posterior oropharyngeal erythema.   Eyes:      Conjunctiva/sclera: Conjunctivae normal.   Cardiovascular:      Rate and Rhythm: Normal rate and regular rhythm.      Pulses: Normal pulses.      Heart sounds: Normal heart sounds. No murmur heard.    No friction rub.   Pulmonary:      Effort: Pulmonary effort is normal. No respiratory distress.      Breath sounds: No wheezing, rhonchi or rales.   Abdominal:      Palpations: Abdomen is soft.      Tenderness: There is no abdominal tenderness. There is no guarding or rebound.   Musculoskeletal:       "   General: No swelling. Normal range of motion.      Cervical back: Neck supple. No tenderness.      Right lower leg: No edema.      Left lower leg: No edema.   Lymphadenopathy:      Cervical: No cervical adenopathy.   Skin:     General: Skin is warm.      Coloration: Skin is not jaundiced.   Neurological:      Mental Status: He is alert and oriented to person, place, and time.   Psychiatric:         Mood and Affect: Mood normal.         Behavior: Behavior normal.         Thought Content: Thought content normal.         Judgment: Judgment normal.           Lab Results   Component Value Date    WBC 9.65 12/05/2022    HGB 16.2 12/05/2022    HCT 48.6 12/05/2022     12/05/2022    CHOL 145 12/05/2022    TRIG 113 12/05/2022    HDL 35 (L) 12/05/2022    ALT 24 12/05/2022    AST 14 (L) 12/05/2022     12/05/2022    K 3.8 12/05/2022    CL 99 12/05/2022    CREATININE 0.99 12/05/2022    BUN 10 12/05/2022    CO2 33 (H) 12/05/2022    HGBA1C 5.6 12/07/2021      Assessment:       1. Hypertension, unspecified type    2. JORGE on CPAP    3. Elevated serum globulin level    4. Primary hypertension    5. Essential hypertension    6. Hyperlipidemia, unspecified hyperlipidemia type        Plan:         Problem List Items Addressed This Visit          Cardiac/Vascular    Primary hypertension (Chronic)     Continue with the current treatment plan  Continue to monitor blood pressure at home   Reduce sodium intake to 2 g daily  Patient encouraged on lifesyle modifications  Follow up in three months           Hypertension     - Continue with the current plan        *Amlodipine 10 mg daily        * Losartan 50 mg daily       * HCTZ 25 mg BID  - Patient educated to reduce sodium intake to less than 2 g daily  - Patient encoraged on lifesyle modifications through diet and exercise  - Follow up in three months           Relevant Medications    losartan (COZAAR) 50 MG tablet    Hyperlipidemia    Relevant Medications    atorvastatin  (LIPITOR) 40 MG tablet (Start on 5/19/2023)       Other    Elevated serum globulin level (Chronic)     CMP about 4 months ago showed Total protein 8.4 from 8.2 in previous lab and Globulin 4.4 stable from previous. Also, patient had a elevated ESR/CRP in previous lab. Notably, patient has history of hep C that was treated. We will monitor for now and repeat lab in the next visit.    Differential diagnosis include autoimmune disease, infection, multiple myeloma but no CRAB symptoms           JORGE on CPAP     Patient follows with pulmonology  Continue to use CPAP at home            Other Visit Diagnoses       Essential hypertension        Relevant Medications    amLODIPine (NORVASC) 10 MG tablet (Start on 5/19/2023)    hydroCHLOROthiazide (HYDRODIURIL) 25 MG tablet (Start on 6/19/2023)              Follow up in about 3 months (around 7/17/2023).    Sidney Alves MD     Instructed patient that if symptoms fail to improve or worsen patient should seek immediate medical attention or report to the nearest emergency department. Patient expressed verbal agreement and understanding to this plan of care.

## 2023-04-17 NOTE — ASSESSMENT & PLAN NOTE
CMP about 4 months ago showed Total protein 8.4 from 8.2 in previous lab and Globulin 4.4 stable from previous. Also, patient had a elevated ESR/CRP in previous lab. Notably, patient has history of hep C that was treated. We will monitor for now and repeat lab in the next visit.    Differential diagnosis include autoimmune disease, infection, multiple myeloma but no CRAB symptoms

## 2023-04-17 NOTE — ASSESSMENT & PLAN NOTE
Continue with the current treatment plan  Continue to monitor blood pressure at home   Reduce sodium intake to 2 g daily  Patient encouraged on lifesyle modifications  Follow up in three months

## 2023-04-17 NOTE — ASSESSMENT & PLAN NOTE
- Continue with the current plan        *Amlodipine 10 mg daily        * Losartan 50 mg daily       * HCTZ 25 mg BID  - Patient educated to reduce sodium intake to less than 2 g daily  - Patient encoraged on lifesyle modifications through diet and exercise  - Follow up in three months

## 2023-07-17 ENCOUNTER — OFFICE VISIT (OUTPATIENT)
Dept: FAMILY MEDICINE | Facility: CLINIC | Age: 58
End: 2023-07-17
Payer: COMMERCIAL

## 2023-07-17 VITALS
RESPIRATION RATE: 16 BRPM | WEIGHT: 213.38 LBS | HEIGHT: 68 IN | SYSTOLIC BLOOD PRESSURE: 168 MMHG | DIASTOLIC BLOOD PRESSURE: 76 MMHG | OXYGEN SATURATION: 99 % | TEMPERATURE: 98 F | BODY MASS INDEX: 32.34 KG/M2 | HEART RATE: 106 BPM

## 2023-07-17 DIAGNOSIS — E78.5 HYPERLIPIDEMIA, UNSPECIFIED HYPERLIPIDEMIA TYPE: ICD-10-CM

## 2023-07-17 DIAGNOSIS — Z72.0 SMOKING TRYING TO QUIT: Primary | ICD-10-CM

## 2023-07-17 DIAGNOSIS — I10 HYPERTENSION, UNSPECIFIED TYPE: ICD-10-CM

## 2023-07-17 DIAGNOSIS — I10 ESSENTIAL HYPERTENSION: ICD-10-CM

## 2023-07-17 DIAGNOSIS — F17.200 CURRENT SMOKER: ICD-10-CM

## 2023-07-17 PROCEDURE — 99213 PR OFFICE/OUTPT VISIT, EST, LEVL III, 20-29 MIN: ICD-10-PCS | Mod: GC,,, | Performed by: FAMILY MEDICINE

## 2023-07-17 PROCEDURE — 4010F PR ACE/ARB THEARPY RXD/TAKEN: ICD-10-PCS | Mod: CPTII,,, | Performed by: FAMILY MEDICINE

## 2023-07-17 PROCEDURE — 3078F DIAST BP <80 MM HG: CPT | Mod: CPTII,,, | Performed by: FAMILY MEDICINE

## 2023-07-17 PROCEDURE — 3077F SYST BP >= 140 MM HG: CPT | Mod: CPTII,,, | Performed by: FAMILY MEDICINE

## 2023-07-17 PROCEDURE — 1159F PR MEDICATION LIST DOCUMENTED IN MEDICAL RECORD: ICD-10-PCS | Mod: CPTII,,, | Performed by: FAMILY MEDICINE

## 2023-07-17 PROCEDURE — 3008F BODY MASS INDEX DOCD: CPT | Mod: CPTII,,, | Performed by: FAMILY MEDICINE

## 2023-07-17 PROCEDURE — 1159F MED LIST DOCD IN RCRD: CPT | Mod: CPTII,,, | Performed by: FAMILY MEDICINE

## 2023-07-17 PROCEDURE — 4010F ACE/ARB THERAPY RXD/TAKEN: CPT | Mod: CPTII,,, | Performed by: FAMILY MEDICINE

## 2023-07-17 PROCEDURE — 99213 OFFICE O/P EST LOW 20 MIN: CPT | Mod: GC,,, | Performed by: FAMILY MEDICINE

## 2023-07-17 PROCEDURE — 3077F PR MOST RECENT SYSTOLIC BLOOD PRESSURE >= 140 MM HG: ICD-10-PCS | Mod: CPTII,,, | Performed by: FAMILY MEDICINE

## 2023-07-17 PROCEDURE — 3078F PR MOST RECENT DIASTOLIC BLOOD PRESSURE < 80 MM HG: ICD-10-PCS | Mod: CPTII,,, | Performed by: FAMILY MEDICINE

## 2023-07-17 PROCEDURE — 3008F PR BODY MASS INDEX (BMI) DOCUMENTED: ICD-10-PCS | Mod: CPTII,,, | Performed by: FAMILY MEDICINE

## 2023-07-17 RX ORDER — ATORVASTATIN CALCIUM 40 MG/1
40 TABLET, FILM COATED ORAL DAILY
Qty: 90 TABLET | Refills: 0 | Status: SHIPPED | OUTPATIENT
Start: 2023-07-17 | End: 2023-10-13

## 2023-07-17 RX ORDER — HYDROCHLOROTHIAZIDE 25 MG/1
25 TABLET ORAL 2 TIMES DAILY
Qty: 118 TABLET | Refills: 0 | Status: SHIPPED | OUTPATIENT
Start: 2023-07-17 | End: 2023-10-20 | Stop reason: SDUPTHER

## 2023-07-17 RX ORDER — IBUPROFEN 200 MG
1 TABLET ORAL DAILY
Qty: 90 PATCH | Refills: 0 | Status: SHIPPED | OUTPATIENT
Start: 2023-07-17 | End: 2023-10-15

## 2023-07-17 RX ORDER — AMLODIPINE BESYLATE 10 MG/1
10 TABLET ORAL DAILY
Qty: 90 TABLET | Refills: 0 | Status: SHIPPED | OUTPATIENT
Start: 2023-07-17 | End: 2023-10-17

## 2023-07-17 RX ORDER — MULTIVITAMIN
1 TABLET ORAL DAILY
Qty: 30 TABLET | Refills: 0 | Status: SHIPPED | OUTPATIENT
Start: 2023-07-17 | End: 2023-08-16

## 2023-07-17 NOTE — PROGRESS NOTES
Subjective:       Patient ID: Horacio Coello is a 58 y.o. male.    Chief Complaint: Follow-up, Hypertension (Keeping log at home, always wnl), and Hyperlipidemia    This is 58 y.o male who present today for follow up on HTN. Patients blood pressure per his logs have improved from last visit. Patients blood pressure is elevated in office but patient has know white coat syndrome. Patient was counseled about the need to stop smoking. Patient is agreeable to try nicotine patches. Patient denies fever, fatigue, HA, N/V/D, shortness of breath or chest pain. Patient told to bring blood pressure cuff next visit so we can compare blood pressure measurement. Will follow up with patient in 3 months      Current Outpatient Medications:     losartan (COZAAR) 50 MG tablet, Take 1 tablet (50 mg total) by mouth once daily., Disp: 122 tablet, Rfl: 0    amLODIPine (NORVASC) 10 MG tablet, Take 1 tablet (10 mg total) by mouth once daily., Disp: 90 tablet, Rfl: 0    atorvastatin (LIPITOR) 40 MG tablet, Take 1 tablet (40 mg total) by mouth once daily., Disp: 90 tablet, Rfl: 0    hydroCHLOROthiazide (HYDRODIURIL) 25 MG tablet, Take 1 tablet (25 mg total) by mouth 2 (two) times daily., Disp: 118 tablet, Rfl: 0    multivitamin (THERAGRAN) per tablet, Take 1 tablet by mouth once daily., Disp: 30 tablet, Rfl: 0    nicotine (NICODERM CQ) 21 mg/24 hr, Place 1 patch onto the skin once daily., Disp: 90 patch, Rfl: 0    Review of patient's allergies indicates:  No Known Allergies    Past Medical History:   Diagnosis Date    Hepatitis C infection 4- 6 years ago    Hypertension     Obesity     Unspecified cirrhosis of liver        History reviewed. No pertinent surgical history.    Family History   Problem Relation Age of Onset    Diabetes Mother     Hypertension Father         Also Gout       Social History     Tobacco Use    Smoking status: Every Day     Packs/day: 1.00     Years: 20.00     Pack years: 20.00     Types: Cigarettes    Smokeless  tobacco: Never   Substance Use Topics    Alcohol use: Not Currently    Drug use: Never       Review of Systems   Constitutional:  Negative for fatigue.   HENT:  Negative for dental problem, facial swelling, mouth dryness, nosebleeds and goiter.    Eyes:  Negative for pain, discharge and itching.   Respiratory:  Negative for cough and shortness of breath.    Cardiovascular:  Negative for chest pain.   Gastrointestinal:  Negative for abdominal distention, abdominal pain, anal bleeding, constipation and diarrhea.   Endocrine: Negative for cold intolerance, heat intolerance and polydipsia.   Genitourinary:  Negative for bladder incontinence, dysuria and enuresis.   Musculoskeletal: Negative.    Allergic/Immunologic: Negative.    Neurological:  Negative for dizziness, light-headedness and headaches.   Hematological: Negative.    Psychiatric/Behavioral: Negative.  Negative for agitation and behavioral problems.        Current Medications:   Medication List with Changes/Refills   New Medications    NICOTINE (NICODERM CQ) 21 MG/24 HR    Place 1 patch onto the skin once daily.       Start Date: 7/17/2023 End Date: 10/15/2023   Current Medications    LOSARTAN (COZAAR) 50 MG TABLET    Take 1 tablet (50 mg total) by mouth once daily.       Start Date: 4/17/2023 End Date: 8/17/2023   Changed and/or Refilled Medications    Modified Medication Previous Medication    AMLODIPINE (NORVASC) 10 MG TABLET amLODIPine (NORVASC) 10 MG tablet       Take 1 tablet (10 mg total) by mouth once daily.    Take 1 tablet (10 mg total) by mouth once daily.       Start Date: 7/17/2023 End Date: 10/15/2023    Start Date: 5/19/2023 End Date: 7/17/2023    ATORVASTATIN (LIPITOR) 40 MG TABLET atorvastatin (LIPITOR) 40 MG tablet       Take 1 tablet (40 mg total) by mouth once daily.    Take 1 tablet (40 mg total) by mouth once daily.       Start Date: 7/17/2023 End Date: 10/15/2023    Start Date: 5/19/2023 End Date: 7/17/2023    HYDROCHLOROTHIAZIDE  "(HYDRODIURIL) 25 MG TABLET hydroCHLOROthiazide (HYDRODIURIL) 25 MG tablet       Take 1 tablet (25 mg total) by mouth 2 (two) times daily.    Take 1 tablet (25 mg total) by mouth 2 (two) times daily.       Start Date: 7/17/2023 End Date: 9/14/2023    Start Date: 6/19/2023 End Date: 7/17/2023    MULTIVITAMIN (THERAGRAN) PER TABLET multivitamin (THERAGRAN) per tablet       Take 1 tablet by mouth once daily.    Take 1 tablet by mouth once daily.       Start Date: 7/17/2023 End Date: 8/16/2023    Start Date: --        End Date: 7/17/2023            Objective:        Vitals:    07/17/23 0809 07/17/23 0822   BP: (!) 168/76 (!) 168/76   BP Location: Left arm Left arm   Patient Position: Sitting Sitting   BP Method: Medium (Automatic) Medium (Automatic)   Pulse: 106    Resp: 16    Temp: 98.4 °F (36.9 °C)    TempSrc: Oral    SpO2: 99%    Weight: 96.8 kg (213 lb 6.4 oz)    Height: 5' 8" (1.727 m)        Physical Exam  Constitutional:       Appearance: He is normal weight.   HENT:      Head: Normocephalic and atraumatic.      Right Ear: Tympanic membrane normal.      Left Ear: Tympanic membrane normal.      Nose: Nose normal.      Mouth/Throat:      Mouth: Mucous membranes are moist.      Pharynx: Oropharynx is clear.   Eyes:      Conjunctiva/sclera: Conjunctivae normal.      Pupils: Pupils are equal, round, and reactive to light.   Cardiovascular:      Rate and Rhythm: Normal rate and regular rhythm.      Pulses: Normal pulses.      Heart sounds: Normal heart sounds.   Pulmonary:      Effort: Pulmonary effort is normal.      Breath sounds: Normal breath sounds.   Abdominal:      General: Abdomen is flat. Bowel sounds are normal.   Musculoskeletal:         General: Normal range of motion.      Cervical back: Normal range of motion.   Skin:     General: Skin is warm and dry.      Capillary Refill: Capillary refill takes less than 2 seconds.   Neurological:      Mental Status: He is alert and oriented to person, place, and time. "           Lab Results   Component Value Date    WBC 9.65 12/05/2022    HGB 16.2 12/05/2022    HCT 48.6 12/05/2022     12/05/2022    CHOL 145 12/05/2022    TRIG 113 12/05/2022    HDL 35 (L) 12/05/2022    ALT 24 12/05/2022    AST 14 (L) 12/05/2022     12/05/2022    K 3.8 12/05/2022    CL 99 12/05/2022    CREATININE 0.99 12/05/2022    BUN 10 12/05/2022    CO2 33 (H) 12/05/2022    HGBA1C 5.6 12/07/2021      Assessment:       1. Smoking trying to quit    2. Essential hypertension    3. Hyperlipidemia, unspecified hyperlipidemia type    4. Hypertension, unspecified type    5. Current smoker        Plan:         Problem List Items Addressed This Visit          Cardiac/Vascular    Hypertension     Refill Norvasc 10 mg  HCTZ 25 mg  Bring BP cuff next visit         Hyperlipidemia     Refill Lipitor 40 mg         Relevant Medications    atorvastatin (LIPITOR) 40 MG tablet    nicotine (NICODERM CQ) 21 mg/24 hr       Other    Current smoker     Nicotine patch  Patient counseled about quitting patient starting the process.          Other Visit Diagnoses       Smoking trying to quit    -  Primary    Essential hypertension        Relevant Medications    amLODIPine (NORVASC) 10 MG tablet    hydroCHLOROthiazide (HYDRODIURIL) 25 MG tablet    multivitamin (THERAGRAN) per tablet              Follow up in about 3 months (around 10/17/2023).    Corbin Villanueva MD     Instructed patient that if symptoms fail to improve or worsen patient should seek immediate medical attention or report to the nearest emergency department. Patient expressed verbal agreement and understanding to this plan of care.

## 2023-10-13 DIAGNOSIS — E78.5 HYPERLIPIDEMIA, UNSPECIFIED HYPERLIPIDEMIA TYPE: ICD-10-CM

## 2023-10-13 RX ORDER — ATORVASTATIN CALCIUM 40 MG/1
40 TABLET, FILM COATED ORAL
Qty: 90 TABLET | Refills: 0 | Status: SHIPPED | OUTPATIENT
Start: 2023-10-13 | End: 2023-10-20 | Stop reason: SDUPTHER

## 2023-10-15 DIAGNOSIS — I10 ESSENTIAL HYPERTENSION: ICD-10-CM

## 2023-10-17 RX ORDER — AMLODIPINE BESYLATE 10 MG/1
10 TABLET ORAL
Qty: 90 TABLET | Refills: 0 | Status: SHIPPED | OUTPATIENT
Start: 2023-10-17 | End: 2023-10-20 | Stop reason: SDUPTHER

## 2023-10-19 ENCOUNTER — OFFICE VISIT (OUTPATIENT)
Dept: FAMILY MEDICINE | Facility: CLINIC | Age: 58
End: 2023-10-19
Payer: COMMERCIAL

## 2023-10-19 VITALS
TEMPERATURE: 100 F | HEART RATE: 117 BPM | BODY MASS INDEX: 32.28 KG/M2 | HEIGHT: 68 IN | WEIGHT: 213 LBS | OXYGEN SATURATION: 97 % | DIASTOLIC BLOOD PRESSURE: 86 MMHG | SYSTOLIC BLOOD PRESSURE: 138 MMHG

## 2023-10-19 DIAGNOSIS — Z86.19 HISTORY OF HEPATITIS C: ICD-10-CM

## 2023-10-19 DIAGNOSIS — I10 ESSENTIAL HYPERTENSION: ICD-10-CM

## 2023-10-19 DIAGNOSIS — I10 HYPERTENSION, UNSPECIFIED TYPE: ICD-10-CM

## 2023-10-19 DIAGNOSIS — J39.9 UPPER RESPIRATORY DISEASE: ICD-10-CM

## 2023-10-19 DIAGNOSIS — R77.1 ELEVATED SERUM GLOBULIN LEVEL: Chronic | ICD-10-CM

## 2023-10-19 DIAGNOSIS — R43.2 LOSS OF TASTE: ICD-10-CM

## 2023-10-19 DIAGNOSIS — E78.5 HYPERLIPIDEMIA, UNSPECIFIED HYPERLIPIDEMIA TYPE: ICD-10-CM

## 2023-10-19 DIAGNOSIS — F17.200 CURRENT SMOKER: ICD-10-CM

## 2023-10-19 DIAGNOSIS — J06.9 UPPER RESPIRATORY TRACT INFECTION, UNSPECIFIED TYPE: Primary | ICD-10-CM

## 2023-10-19 LAB
CTP QC/QA: YES
FLUAV AG NPH QL: NEGATIVE
FLUBV AG NPH QL: NEGATIVE
SARS-COV-2 AG RESP QL IA.RAPID: NEGATIVE

## 2023-10-19 PROCEDURE — 3008F BODY MASS INDEX DOCD: CPT | Mod: CPTII,,, | Performed by: FAMILY MEDICINE

## 2023-10-19 PROCEDURE — 4010F ACE/ARB THERAPY RXD/TAKEN: CPT | Mod: CPTII,,, | Performed by: FAMILY MEDICINE

## 2023-10-19 PROCEDURE — 3008F PR BODY MASS INDEX (BMI) DOCUMENTED: ICD-10-PCS | Mod: CPTII,,, | Performed by: FAMILY MEDICINE

## 2023-10-19 PROCEDURE — 4010F PR ACE/ARB THEARPY RXD/TAKEN: ICD-10-PCS | Mod: CPTII,,, | Performed by: FAMILY MEDICINE

## 2023-10-19 PROCEDURE — 1159F MED LIST DOCD IN RCRD: CPT | Mod: CPTII,,, | Performed by: FAMILY MEDICINE

## 2023-10-19 PROCEDURE — 3075F PR MOST RECENT SYSTOLIC BLOOD PRESS GE 130-139MM HG: ICD-10-PCS | Mod: CPTII,,, | Performed by: FAMILY MEDICINE

## 2023-10-19 PROCEDURE — 3079F DIAST BP 80-89 MM HG: CPT | Mod: CPTII,,, | Performed by: FAMILY MEDICINE

## 2023-10-19 PROCEDURE — 87428 SARSCOV & INF VIR A&B AG IA: CPT | Mod: QW,,, | Performed by: FAMILY MEDICINE

## 2023-10-19 PROCEDURE — 1159F PR MEDICATION LIST DOCUMENTED IN MEDICAL RECORD: ICD-10-PCS | Mod: CPTII,,, | Performed by: FAMILY MEDICINE

## 2023-10-19 PROCEDURE — 99213 PR OFFICE/OUTPT VISIT, EST, LEVL III, 20-29 MIN: ICD-10-PCS | Mod: ,,, | Performed by: FAMILY MEDICINE

## 2023-10-19 PROCEDURE — 99213 OFFICE O/P EST LOW 20 MIN: CPT | Mod: ,,, | Performed by: FAMILY MEDICINE

## 2023-10-19 PROCEDURE — 3075F SYST BP GE 130 - 139MM HG: CPT | Mod: CPTII,,, | Performed by: FAMILY MEDICINE

## 2023-10-19 PROCEDURE — 3079F PR MOST RECENT DIASTOLIC BLOOD PRESSURE 80-89 MM HG: ICD-10-PCS | Mod: CPTII,,, | Performed by: FAMILY MEDICINE

## 2023-10-19 PROCEDURE — 87428 POCT SARS-COV2 (COVID) WITH FLU ANTIGEN: ICD-10-PCS | Mod: QW,,, | Performed by: FAMILY MEDICINE

## 2023-10-19 NOTE — PROGRESS NOTES
Subjective:       Patient ID: Horacio Coello is a 58 y.o. male.    Chief Complaint: Fever, Cough, and Chills    HPI    Patient is a 56 yo male with a medical history of hypetentension, hyperlipidemia, nicotice dependence  who presents to the clinic with a concern of upper respiratory infection. Symptoms started six days ago and incude dizziness, light headiness, fever, cough and diarrhea along with lost of taste that larted two days ago. He denies any chest pain, palptiations, shortness of breath, abdominal pain, numnbess, tingling and weakness. He admitted positive exposure through his wife who presented positive for Covid 19 early this week through a home Covid 19 test. Past medical history is significant     Current Outpatient Medications:     amLODIPine (NORVASC) 10 MG tablet, Take 1 tablet (10 mg total) by mouth once daily., Disp: 90 tablet, Rfl: 1    atorvastatin (LIPITOR) 40 MG tablet, Take 1 tablet (40 mg total) by mouth once daily., Disp: 90 tablet, Rfl: 1    hydroCHLOROthiazide (HYDRODIURIL) 25 MG tablet, Take 1 tablet (25 mg total) by mouth 2 (two) times daily., Disp: 180 tablet, Rfl: 1    losartan (COZAAR) 50 MG tablet, Take 1 tablet (50 mg total) by mouth once daily., Disp: 90 tablet, Rfl: 1    Review of patient's allergies indicates:  No Known Allergies    Past Medical History:   Diagnosis Date    Hepatitis C infection 4- 6 years ago    Hypertension     Obesity     Unspecified cirrhosis of liver        No past surgical history on file.    Family History   Problem Relation Age of Onset    Diabetes Mother     Hypertension Father         Also Gout       Social History     Tobacco Use    Smoking status: Every Day     Current packs/day: 1.00     Average packs/day: 1 pack/day for 20.0 years (20.0 ttl pk-yrs)     Types: Cigarettes    Smokeless tobacco: Never   Substance Use Topics    Alcohol use: Not Currently    Drug use: Never       Review of Systems   Constitutional:  Positive for chills and fever.  Negative for activity change, appetite change and fatigue.   HENT:  Negative for nasal congestion, mouth dryness, mouth sores, postnasal drip, sneezing, sore throat, tinnitus and trouble swallowing.    Eyes:  Negative for pain, redness and visual disturbance.   Respiratory:  Positive for cough. Negative for chest tightness, shortness of breath, wheezing and stridor.    Cardiovascular:  Negative for chest pain, palpitations, leg swelling and claudication.   Gastrointestinal:  Positive for diarrhea, nausea and vomiting. Negative for abdominal pain, blood in stool, constipation and reflux.   Genitourinary:  Negative for hematuria.   Musculoskeletal:  Negative for neck pain and neck stiffness.   Integumentary:  Negative for rash and wound.   Neurological:  Positive for dizziness and light-headedness. Negative for vertigo, tremors, seizures, syncope, speech difficulty, weakness, numbness and headaches.   Hematological:  Negative for adenopathy.   Psychiatric/Behavioral:  Negative for agitation, behavioral problems and confusion.          Current Medications:   Medication List with Changes/Refills   Changed and/or Refilled Medications    Modified Medication Previous Medication    AMLODIPINE (NORVASC) 10 MG TABLET amLODIPine (NORVASC) 10 MG tablet       Take 1 tablet (10 mg total) by mouth once daily.    TAKE 1 TABLET(10 MG) BY MOUTH EVERY DAY       Start Date: 10/20/2023End Date: 4/17/2024    Start Date: 10/17/2023End Date: 10/20/2023    ATORVASTATIN (LIPITOR) 40 MG TABLET atorvastatin (LIPITOR) 40 MG tablet       Take 1 tablet (40 mg total) by mouth once daily.    TAKE 1 TABLET(40 MG) BY MOUTH EVERY DAY       Start Date: 10/20/2023End Date: 4/17/2024    Start Date: 10/13/2023End Date: 10/20/2023    HYDROCHLOROTHIAZIDE (HYDRODIURIL) 25 MG TABLET hydroCHLOROthiazide (HYDRODIURIL) 25 MG tablet       Take 1 tablet (25 mg total) by mouth 2 (two) times daily.    Take 1 tablet (25 mg total) by mouth 2 (two) times daily.        "Start Date: 10/20/2023End Date: 4/17/2024    Start Date: 7/17/2023 End Date: 10/20/2023    LOSARTAN (COZAAR) 50 MG TABLET losartan (COZAAR) 50 MG tablet       Take 1 tablet (50 mg total) by mouth once daily.    Take 1 tablet (50 mg total) by mouth once daily.       Start Date: 10/20/2023End Date: 4/17/2024    Start Date: 4/17/2023 End Date: 10/20/2023            Objective:        Vitals:    10/19/23 1351 10/19/23 1415   BP: (!) 146/80 138/86   BP Location: Left arm Left arm   Patient Position: Sitting Sitting   BP Method: Medium (Manual) Medium (Manual)   Pulse: (!) 117    Temp: 99.7 °F (37.6 °C)    TempSrc: Oral    SpO2: 97%    Weight: 96.6 kg (213 lb)    Height: 5' 8" (1.727 m)        Physical Exam  Vitals and nursing note reviewed.   Constitutional:       General: He is not in acute distress.     Appearance: He is not ill-appearing, toxic-appearing or diaphoretic.   HENT:      Head: Normocephalic and atraumatic.      Right Ear: External ear normal.      Left Ear: External ear normal.      Nose: Nose normal.      Mouth/Throat:      Mouth: Mucous membranes are moist.      Pharynx: Oropharynx is clear. No oropharyngeal exudate or posterior oropharyngeal erythema.   Eyes:      General: No scleral icterus.        Right eye: No discharge.         Left eye: No discharge.      Extraocular Movements: Extraocular movements intact.      Conjunctiva/sclera: Conjunctivae normal.   Cardiovascular:      Rate and Rhythm: Normal rate and regular rhythm.      Pulses: Normal pulses.      Heart sounds: Normal heart sounds. No murmur heard.     No friction rub.   Pulmonary:      Effort: Pulmonary effort is normal. No respiratory distress.      Breath sounds: No wheezing, rhonchi or rales.   Abdominal:      General: Bowel sounds are normal.      Palpations: Abdomen is soft.      Tenderness: There is no abdominal tenderness. There is no guarding or rebound.   Musculoskeletal:         General: No swelling. Normal range of motion.      " Cervical back: Neck supple. No tenderness.      Right lower leg: No edema.      Left lower leg: No edema.   Lymphadenopathy:      Cervical: No cervical adenopathy.   Skin:     General: Skin is warm.      Coloration: Skin is not jaundiced.   Neurological:      Mental Status: He is alert and oriented to person, place, and time. Mental status is at baseline.   Psychiatric:         Mood and Affect: Mood normal.         Behavior: Behavior normal.         Thought Content: Thought content normal.         Judgment: Judgment normal.             Lab Results   Component Value Date    WBC 9.65 12/05/2022    HGB 16.2 12/05/2022    HCT 48.6 12/05/2022     12/05/2022    CHOL 145 12/05/2022    TRIG 113 12/05/2022    HDL 35 (L) 12/05/2022    ALT 24 12/05/2022    AST 14 (L) 12/05/2022     12/05/2022    K 3.8 12/05/2022    CL 99 12/05/2022    CREATININE 0.99 12/05/2022    BUN 10 12/05/2022    CO2 33 (H) 12/05/2022    HGBA1C 5.6 12/07/2021      Assessment:       1. Upper respiratory tract infection, unspecified type    2. Loss of taste    3. Essential hypertension    4. Hyperlipidemia, unspecified hyperlipidemia type    5. Hypertension, unspecified type    6. History of hepatitis C    7. Elevated serum globulin level    8. Current smoker    9. Upper respiratory disease        Plan:         Problem List Items Addressed This Visit          Pulmonary    Upper respiratory disease         This is likely Covid 19 infection in the setting of loss of taste. We discussed the option of starting Paxlovid, but the patient noted that his symptoms actually are getting better and is elected to continue monitoring. He will consider starting this medication if symptoms worsened.    Rapid Flu and Covid 19  Patient educated on supportive treatment              Cardiac/Vascular    Hypertension    Relevant Medications    losartan (COZAAR) 50 MG tablet    Hyperlipidemia     Continue with the current plan  Refill statin  Follow in three months  for lab work         Relevant Medications    atorvastatin (LIPITOR) 40 MG tablet       GI    History of hepatitis C     Follow up in three months to repeat lab  History of resolved Hep infection with IgG immunity            Other    Elevated serum globulin level (Chronic)     Plan to repeat lab in the next visit         Current smoker     Patient is motivated to quite smoking. His wife is helping to quite smoking. He has not been smoking for 1-2 weeks and plan to continue but complaining that it  Is not martin.          Other Visit Diagnoses       Upper respiratory tract infection, unspecified type    -  Primary    Loss of taste        Relevant Orders    POCT SARS-COV2 (COVID) with Flu Antigen (Completed)    Essential hypertension        Relevant Medications    amLODIPine (NORVASC) 10 MG tablet    hydroCHLOROthiazide (HYDRODIURIL) 25 MG tablet              Follow up in about 3 months (around 1/19/2024).    Sidney Alves MD     Instructed patient that if symptoms fail to improve or worsen patient should seek immediate medical attention or report to the nearest emergency department. Patient expressed verbal agreement and understanding to this plan of care.

## 2023-10-20 PROBLEM — J39.9 UPPER RESPIRATORY DISEASE: Status: ACTIVE | Noted: 2023-10-20

## 2023-10-20 PROBLEM — Z86.19 HX OF HEPATITIS C: Chronic | Status: RESOLVED | Noted: 2021-12-07 | Resolved: 2023-10-20

## 2023-10-20 PROBLEM — Z87.19 HISTORY OF CIRRHOSIS OF LIVER: Status: RESOLVED | Noted: 2022-11-21 | Resolved: 2023-10-20

## 2023-10-20 RX ORDER — ATORVASTATIN CALCIUM 40 MG/1
40 TABLET, FILM COATED ORAL DAILY
Qty: 90 TABLET | Refills: 1 | Status: SHIPPED | OUTPATIENT
Start: 2023-10-20 | End: 2024-02-01 | Stop reason: SDUPTHER

## 2023-10-20 RX ORDER — HYDROCHLOROTHIAZIDE 25 MG/1
25 TABLET ORAL 2 TIMES DAILY
Qty: 180 TABLET | Refills: 1 | Status: SHIPPED | OUTPATIENT
Start: 2023-10-20 | End: 2024-02-01 | Stop reason: SDUPTHER

## 2023-10-20 RX ORDER — HYDROCHLOROTHIAZIDE 25 MG/1
25 TABLET ORAL 2 TIMES DAILY
Qty: 360 TABLET | Refills: 0 | Status: SHIPPED | OUTPATIENT
Start: 2023-10-20 | End: 2023-10-20

## 2023-10-20 RX ORDER — LOSARTAN POTASSIUM 50 MG/1
50 TABLET ORAL DAILY
Qty: 90 TABLET | Refills: 1 | Status: SHIPPED | OUTPATIENT
Start: 2023-10-20 | End: 2024-02-01 | Stop reason: SDUPTHER

## 2023-10-20 RX ORDER — AMLODIPINE BESYLATE 10 MG/1
10 TABLET ORAL DAILY
Qty: 90 TABLET | Refills: 1 | Status: SHIPPED | OUTPATIENT
Start: 2023-10-20 | End: 2024-02-01 | Stop reason: SDUPTHER

## 2023-10-21 NOTE — ASSESSMENT & PLAN NOTE
Blood pressure currently controlled  Continue with the current plan  Refill medications  Follow up in three months for annual ab work

## 2023-10-21 NOTE — ASSESSMENT & PLAN NOTE
This is likely Covid 19 infection in the setting of loss of taste. We discussed the option of starting Paxlovid, but the patient noted that his symptoms actually are getting better and is elected to continue monitoring. He will consider starting this medication if symptoms worsened.    Rapid Flu and Covid 19  Patient educated on supportive treatment

## 2023-10-21 NOTE — ASSESSMENT & PLAN NOTE
Patient is motivated to quite smoking. His wife is helping to quite smoking. He has not been smoking for 1-2 weeks and plan to continue but complaining that it  Is not martin.

## 2024-02-01 ENCOUNTER — OFFICE VISIT (OUTPATIENT)
Dept: FAMILY MEDICINE | Facility: CLINIC | Age: 59
End: 2024-02-01
Payer: COMMERCIAL

## 2024-02-01 VITALS
BODY MASS INDEX: 32.31 KG/M2 | HEIGHT: 68 IN | TEMPERATURE: 99 F | DIASTOLIC BLOOD PRESSURE: 82 MMHG | RESPIRATION RATE: 20 BRPM | SYSTOLIC BLOOD PRESSURE: 139 MMHG | WEIGHT: 213.19 LBS | OXYGEN SATURATION: 97 % | HEART RATE: 98 BPM

## 2024-02-01 DIAGNOSIS — I10 HYPERTENSION, UNSPECIFIED TYPE: ICD-10-CM

## 2024-02-01 DIAGNOSIS — E78.5 HYPERLIPIDEMIA, UNSPECIFIED HYPERLIPIDEMIA TYPE: ICD-10-CM

## 2024-02-01 DIAGNOSIS — E66.9 OBESITY (BMI 30-39.9): Chronic | ICD-10-CM

## 2024-02-01 DIAGNOSIS — R53.83 FATIGUE, UNSPECIFIED TYPE: ICD-10-CM

## 2024-02-01 DIAGNOSIS — G47.33 OSA ON CPAP: ICD-10-CM

## 2024-02-01 DIAGNOSIS — R73.03 PREDIABETES: ICD-10-CM

## 2024-02-01 DIAGNOSIS — H40.053 BILATERAL OCULAR HYPERTENSION: ICD-10-CM

## 2024-02-01 DIAGNOSIS — Z28.21 VACCINATION REFUSED BY PATIENT: ICD-10-CM

## 2024-02-01 DIAGNOSIS — I10 PRIMARY HYPERTENSION: Chronic | ICD-10-CM

## 2024-02-01 DIAGNOSIS — I10 ESSENTIAL HYPERTENSION: ICD-10-CM

## 2024-02-01 DIAGNOSIS — E66.9 OBESITY, UNSPECIFIED CLASSIFICATION, UNSPECIFIED OBESITY TYPE, UNSPECIFIED WHETHER SERIOUS COMORBIDITY PRESENT: ICD-10-CM

## 2024-02-01 DIAGNOSIS — R77.1 ELEVATED SERUM GLOBULIN LEVEL: Chronic | ICD-10-CM

## 2024-02-01 DIAGNOSIS — F17.200 CURRENT SMOKER: ICD-10-CM

## 2024-02-01 DIAGNOSIS — E11.9 TYPE 2 DIABETES MELLITUS WITHOUT COMPLICATION, WITHOUT LONG-TERM CURRENT USE OF INSULIN: Primary | ICD-10-CM

## 2024-02-01 LAB
ALBUMIN SERPL BCP-MCNC: 4 G/DL (ref 3.5–5)
ALBUMIN/GLOB SERPL: 0.9 {RATIO}
ALP SERPL-CCNC: 103 U/L (ref 45–115)
ALT SERPL W P-5'-P-CCNC: 26 U/L (ref 16–61)
ANION GAP SERPL CALCULATED.3IONS-SCNC: 7 MMOL/L (ref 7–16)
AST SERPL W P-5'-P-CCNC: 18 U/L (ref 15–37)
BILIRUB SERPL-MCNC: 0.7 MG/DL (ref ?–1.2)
BUN SERPL-MCNC: 13 MG/DL (ref 7–18)
BUN/CREAT SERPL: 12 (ref 6–20)
CALCIUM SERPL-MCNC: 9.4 MG/DL (ref 8.5–10.1)
CHLORIDE SERPL-SCNC: 103 MMOL/L (ref 98–107)
CO2 SERPL-SCNC: 32 MMOL/L (ref 21–32)
CREAT SERPL-MCNC: 1.08 MG/DL (ref 0.7–1.3)
EGFR (NO RACE VARIABLE) (RUSH/TITUS): 79 ML/MIN/1.73M2
EST. AVERAGE GLUCOSE BLD GHB EST-MCNC: 120 MG/DL
GLOBULIN SER-MCNC: 4.3 G/DL (ref 2–4)
GLUCOSE SERPL-MCNC: 93 MG/DL (ref 74–106)
HBA1C MFR BLD HPLC: 5.8 % (ref 4.5–6.6)
POTASSIUM SERPL-SCNC: 3.7 MMOL/L (ref 3.5–5.1)
PROT SERPL-MCNC: 8.3 G/DL (ref 6.4–8.2)
SODIUM SERPL-SCNC: 138 MMOL/L (ref 136–145)
TSH SERPL DL<=0.005 MIU/L-ACNC: 1.53 UIU/ML (ref 0.36–3.74)

## 2024-02-01 PROCEDURE — 3075F SYST BP GE 130 - 139MM HG: CPT | Mod: CPTII,,, | Performed by: FAMILY MEDICINE

## 2024-02-01 PROCEDURE — 84443 ASSAY THYROID STIM HORMONE: CPT | Mod: ,,, | Performed by: CLINICAL MEDICAL LABORATORY

## 2024-02-01 PROCEDURE — 1159F MED LIST DOCD IN RCRD: CPT | Mod: CPTII,,, | Performed by: FAMILY MEDICINE

## 2024-02-01 PROCEDURE — 3008F BODY MASS INDEX DOCD: CPT | Mod: CPTII,,, | Performed by: FAMILY MEDICINE

## 2024-02-01 PROCEDURE — 3044F HG A1C LEVEL LT 7.0%: CPT | Mod: CPTII,,, | Performed by: FAMILY MEDICINE

## 2024-02-01 PROCEDURE — 80053 COMPREHEN METABOLIC PANEL: CPT | Mod: ,,, | Performed by: CLINICAL MEDICAL LABORATORY

## 2024-02-01 PROCEDURE — 83036 HEMOGLOBIN GLYCOSYLATED A1C: CPT | Mod: ,,, | Performed by: CLINICAL MEDICAL LABORATORY

## 2024-02-01 PROCEDURE — 4010F ACE/ARB THERAPY RXD/TAKEN: CPT | Mod: CPTII,,, | Performed by: FAMILY MEDICINE

## 2024-02-01 PROCEDURE — 3079F DIAST BP 80-89 MM HG: CPT | Mod: CPTII,,, | Performed by: FAMILY MEDICINE

## 2024-02-01 PROCEDURE — 99214 OFFICE O/P EST MOD 30 MIN: CPT | Mod: ,,, | Performed by: FAMILY MEDICINE

## 2024-02-01 RX ORDER — LOSARTAN POTASSIUM 50 MG/1
50 TABLET ORAL DAILY
Qty: 90 TABLET | Refills: 1 | Status: SHIPPED | OUTPATIENT
Start: 2024-02-01 | End: 2024-06-03 | Stop reason: SDUPTHER

## 2024-02-01 RX ORDER — AMLODIPINE BESYLATE 10 MG/1
10 TABLET ORAL DAILY
Qty: 90 TABLET | Refills: 1 | Status: SHIPPED | OUTPATIENT
Start: 2024-02-01 | End: 2024-07-30

## 2024-02-01 RX ORDER — LATANOPROST 50 UG/ML
1 SOLUTION/ DROPS OPHTHALMIC NIGHTLY
COMMUNITY
Start: 2024-01-06

## 2024-02-01 RX ORDER — ATORVASTATIN CALCIUM 40 MG/1
40 TABLET, FILM COATED ORAL DAILY
Qty: 90 TABLET | Refills: 1 | Status: SHIPPED | OUTPATIENT
Start: 2024-02-01 | End: 2024-07-30

## 2024-02-01 RX ORDER — HYDROCHLOROTHIAZIDE 25 MG/1
25 TABLET ORAL 2 TIMES DAILY
Qty: 180 TABLET | Refills: 1 | Status: SHIPPED | OUTPATIENT
Start: 2024-02-01 | End: 2024-07-30

## 2024-02-01 NOTE — PROGRESS NOTES
Subjective:       Patient ID: Horacio Coello is a 58 y.o. male.    Chief Complaint: Follow-up (Room # med refill)    HPI    Patient is a 56 yo male with a medical history of hypertension, hyperlipidemia, nicotine dependence  who presents to the clinic for follow up and medication refills. Patient stated that he is doing good and has no concern. Blood pressure as checked at the office is 139/82 with a pulse of 98, this consistent with his blood pressure with average SBP of 120s.He denies headache, fever, chills, chest pain, shortness of breath, palpitations, dizziness, nausea, vomiting, diarrhea, constipation and abdominal pain. Blood pressure is controlled under the current regiment. He is due today for annual A1c, TSH and CMP.     Preventive measure: He is due for Low dose CT for lung cancer screening which he refuses today but is willing to reconsider that in the next visit.      Current Outpatient Medications:     latanoprost 0.005 % ophthalmic solution, Place 1 drop into both eyes every evening., Disp: , Rfl:     amLODIPine (NORVASC) 10 MG tablet, Take 1 tablet (10 mg total) by mouth once daily., Disp: 90 tablet, Rfl: 1    atorvastatin (LIPITOR) 40 MG tablet, Take 1 tablet (40 mg total) by mouth once daily., Disp: 90 tablet, Rfl: 1    hydroCHLOROthiazide (HYDRODIURIL) 25 MG tablet, Take 1 tablet (25 mg total) by mouth 2 (two) times daily., Disp: 180 tablet, Rfl: 1    losartan (COZAAR) 50 MG tablet, Take 1 tablet (50 mg total) by mouth once daily., Disp: 90 tablet, Rfl: 1    Review of patient's allergies indicates:  No Known Allergies    Past Medical History:   Diagnosis Date    Hepatitis C infection 4- 6 years ago    Hypertension     Obesity     Unspecified cirrhosis of liver        History reviewed. No pertinent surgical history.    Family History   Problem Relation Age of Onset    Diabetes Mother     Hypertension Father         Also Gout       Social History     Tobacco Use    Smoking status: Every Day      Current packs/day: 1.00     Average packs/day: 1 pack/day for 20.0 years (20.0 ttl pk-yrs)     Types: Cigarettes    Smokeless tobacco: Never   Substance Use Topics    Alcohol use: Not Currently    Drug use: Never       Review of Systems   Constitutional:  Negative for activity change, appetite change, chills, fatigue and fever.   HENT:  Negative for nasal congestion, mouth dryness, mouth sores, postnasal drip, sneezing, sore throat, tinnitus and trouble swallowing.    Eyes:  Negative for discharge, itching and visual disturbance.   Respiratory:  Negative for cough, chest tightness, shortness of breath, wheezing and stridor.    Cardiovascular:  Negative for chest pain, palpitations, leg swelling and claudication.   Gastrointestinal:  Negative for abdominal pain, blood in stool, constipation, diarrhea, nausea, vomiting and reflux.   Genitourinary:  Negative for hematuria.   Musculoskeletal:  Negative for neck pain and neck stiffness.   Integumentary:  Negative for rash and wound.   Neurological:  Negative for vertigo, tremors, seizures, syncope, speech difficulty, weakness and numbness.   Hematological:  Negative for adenopathy.   Psychiatric/Behavioral:  Negative for agitation, behavioral problems and confusion.          Current Medications:   Medication List with Changes/Refills   Current Medications    LATANOPROST 0.005 % OPHTHALMIC SOLUTION    Place 1 drop into both eyes every evening.       Start Date: 1/6/2024  End Date: --   Changed and/or Refilled Medications    Modified Medication Previous Medication    AMLODIPINE (NORVASC) 10 MG TABLET amLODIPine (NORVASC) 10 MG tablet       Take 1 tablet (10 mg total) by mouth once daily.    Take 1 tablet (10 mg total) by mouth once daily.       Start Date: 2/1/2024  End Date: 7/30/2024    Start Date: 10/20/2023End Date: 2/1/2024    ATORVASTATIN (LIPITOR) 40 MG TABLET atorvastatin (LIPITOR) 40 MG tablet       Take 1 tablet (40 mg total) by mouth once daily.    Take 1  "tablet (40 mg total) by mouth once daily.       Start Date: 2/1/2024  End Date: 7/30/2024    Start Date: 10/20/2023End Date: 2/1/2024    HYDROCHLOROTHIAZIDE (HYDRODIURIL) 25 MG TABLET hydroCHLOROthiazide (HYDRODIURIL) 25 MG tablet       Take 1 tablet (25 mg total) by mouth 2 (two) times daily.    Take 1 tablet (25 mg total) by mouth 2 (two) times daily.       Start Date: 2/1/2024  End Date: 7/30/2024    Start Date: 10/20/2023End Date: 2/1/2024    LOSARTAN (COZAAR) 50 MG TABLET losartan (COZAAR) 50 MG tablet       Take 1 tablet (50 mg total) by mouth once daily.    Take 1 tablet (50 mg total) by mouth once daily.       Start Date: 2/1/2024  End Date: 7/30/2024    Start Date: 10/20/2023End Date: 2/1/2024            Objective:        Vitals:    02/01/24 0857 02/01/24 0902   BP: (!) 149/82 139/82   BP Location: Left arm Left arm   Patient Position: Sitting Sitting   BP Method: Medium (Manual) Medium (Manual)   Pulse: 98    Resp: 20    Temp: 98.6 °F (37 °C)    TempSrc: Oral    SpO2: 97%    Weight: 96.7 kg (213 lb 3.2 oz)    Height: 5' 8" (1.727 m)        Physical Exam  Vitals and nursing note reviewed.   Constitutional:       General: He is not in acute distress.     Appearance: He is not ill-appearing.   HENT:      Head: Normocephalic and atraumatic.      Right Ear: External ear normal.      Left Ear: External ear normal.      Nose: Nose normal.      Mouth/Throat:      Mouth: Mucous membranes are moist.      Pharynx: Oropharynx is clear. No oropharyngeal exudate or posterior oropharyngeal erythema.   Eyes:      General: No scleral icterus.        Right eye: No discharge.         Left eye: No discharge.      Extraocular Movements: Extraocular movements intact.      Conjunctiva/sclera: Conjunctivae normal.      Pupils: Pupils are equal, round, and reactive to light.   Cardiovascular:      Rate and Rhythm: Normal rate and regular rhythm.      Pulses: Normal pulses.      Heart sounds: Normal heart sounds. No murmur heard.   "   No friction rub.   Pulmonary:      Effort: Pulmonary effort is normal. No respiratory distress.      Breath sounds: No wheezing, rhonchi or rales.   Abdominal:      General: Bowel sounds are normal.      Palpations: Abdomen is soft.      Tenderness: There is no abdominal tenderness. There is no guarding or rebound.   Musculoskeletal:         General: No swelling. Normal range of motion.      Cervical back: Neck supple. No tenderness.      Right lower leg: No edema.      Left lower leg: No edema.   Lymphadenopathy:      Cervical: No cervical adenopathy.   Skin:     General: Skin is warm.      Coloration: Skin is not jaundiced.   Neurological:      Mental Status: He is alert and oriented to person, place, and time. Mental status is at baseline.   Psychiatric:         Mood and Affect: Mood normal.         Behavior: Behavior normal.         Thought Content: Thought content normal.         Judgment: Judgment normal.             Lab Results   Component Value Date    WBC 9.65 12/05/2022    HGB 16.2 12/05/2022    HCT 48.6 12/05/2022     12/05/2022    CHOL 145 12/05/2022    TRIG 113 12/05/2022    HDL 35 (L) 12/05/2022    ALT 26 02/01/2024    AST 18 02/01/2024     02/01/2024    K 3.7 02/01/2024     02/01/2024    CREATININE 1.08 02/01/2024    BUN 13 02/01/2024    CO2 32 02/01/2024    TSH 1.530 02/01/2024    HGBA1C 5.8 02/01/2024      Assessment:       1. Type 2 diabetes mellitus without complication, without long-term current use of insulin    2. Obesity, unspecified classification, unspecified obesity type, unspecified whether serious comorbidity present    3. Hypertension, unspecified type    4. Fatigue, unspecified type    5. Essential hypertension    6. Hyperlipidemia, unspecified hyperlipidemia type    7. Vaccination refused by patient    8. Primary hypertension    9. JORGE on CPAP    10. Current smoker    11. Elevated serum globulin level    12. Obesity (BMI 30-39.9)    13. Prediabetes    14. Bilateral  ocular hypertension        Plan:         Problem List Items Addressed This Visit          Ophtho    Ocular hypertension     Follow with ophthalmology  Continue Latanoprost as ordered            Cardiac/Vascular    Primary hypertension (Chronic)     - Blood pressure is controlled at 139/82 with pulse of 98  - Currently on Amlodipine 10 mg daily, HCTZ 25 mg BID, and Losartan 50 mg daily  - Patient advised to continue lifestyle modification through diet and exercised  - Will refill and continue current medications with no change but we need to monitor potassium level (currently at 3.7) under HCTZ 25 mg BID         Hypertension    Relevant Medications    losartan (COZAAR) 50 MG tablet    Other Relevant Orders    Comprehensive Metabolic Panel (Completed)    Hyperlipidemia     - Lipid is at goal as last checked  - Currently on Atorvastatin 40 mg daily  - Patient advised to continue lifestyle modification through diet and exercised  - Will refill and continue current medications with no change         Relevant Medications    atorvastatin (LIPITOR) 40 MG tablet       Endocrine    Obesity (BMI 30-39.9) (Chronic)     Patient is working on lifestyle modifications through diet and exercise         Prediabetes     - A1c 5.8 from 5.6 as last checked  - Patient will continue lifestyle changes through diet and exercise  - If that fails, we will continue starting Metformin 500 mg   - Will monitor for now and give him a chance to work on lifestyle changes            Other    Elevated serum globulin level (Chronic)     - Near normal at 4.3 with total protein of 8.3  - Will continue to monitor for now         Current smoker     Patient due for LD cT lung screening but is not yet ready for the test         JORGE on CPAP     - JORGE improving with the use of CPAP at home with controlled blood pressure  - Continue with the current plan with no change          Other Visit Diagnoses       Type 2 diabetes mellitus without complication, without  long-term current use of insulin    -  Primary    Relevant Orders    Hemoglobin A1C (Completed)    Obesity, unspecified classification, unspecified obesity type, unspecified whether serious comorbidity present        Relevant Orders    TSH (Completed)    Fatigue, unspecified type        Relevant Orders    TSH (Completed)    Essential hypertension        Relevant Medications    amLODIPine (NORVASC) 10 MG tablet    hydroCHLOROthiazide (HYDRODIURIL) 25 MG tablet    Vaccination refused by patient                  Follow up in about 3 months (around 5/1/2024), or Wellness.    Sidney Alves MD     Instructed patient that if symptoms fail to improve or worsen patient should seek immediate medical attention or report to the nearest emergency department. Patient expressed verbal agreement and understanding to this plan of care.

## 2024-02-02 PROBLEM — E78.00 HYPERCHOLESTEROLEMIA: Status: RESOLVED | Noted: 2021-12-13 | Resolved: 2024-02-02

## 2024-02-02 PROBLEM — J39.9 UPPER RESPIRATORY DISEASE: Status: RESOLVED | Noted: 2023-10-20 | Resolved: 2024-02-02

## 2024-02-02 PROBLEM — Z23 NEED FOR TDAP VACCINATION: Status: RESOLVED | Noted: 2022-11-21 | Resolved: 2024-02-02

## 2024-02-02 PROBLEM — H40.059 OCULAR HYPERTENSION: Status: ACTIVE | Noted: 2024-02-02

## 2024-02-02 PROBLEM — R73.03 PREDIABETES: Status: ACTIVE | Noted: 2024-02-02

## 2024-02-02 PROBLEM — Z23 PNEUMOCOCCAL VACCINE ADMINISTERED: Status: RESOLVED | Noted: 2022-11-21 | Resolved: 2024-02-02

## 2024-02-02 NOTE — ASSESSMENT & PLAN NOTE
- Lipid is at goal as last checked  - Currently on Atorvastatin 40 mg daily  - Patient advised to continue lifestyle modification through diet and exercised  - Will refill and continue current medications with no change

## 2024-02-02 NOTE — ASSESSMENT & PLAN NOTE
- A1c 5.8 from 5.6 as last checked  - Patient will continue lifestyle changes through diet and exercise  - If that fails, we will continue starting Metformin 500 mg   - Will monitor for now and give him a chance to work on lifestyle changes

## 2024-02-02 NOTE — ASSESSMENT & PLAN NOTE
- JORGE improving with the use of CPAP at home with controlled blood pressure  - Continue with the current plan with no change

## 2024-02-02 NOTE — ASSESSMENT & PLAN NOTE
- Blood pressure is controlled at 139/82 with pulse of 98  - Currently on Amlodipine 10 mg daily, HCTZ 25 mg BID, and Losartan 50 mg daily  - Patient advised to continue lifestyle modification through diet and exercised  - Will refill and continue current medications with no change but we need to monitor potassium level (currently at 3.7) under HCTZ 25 mg BID

## 2024-05-02 ENCOUNTER — OFFICE VISIT (OUTPATIENT)
Dept: FAMILY MEDICINE | Facility: CLINIC | Age: 59
End: 2024-05-02
Payer: COMMERCIAL

## 2024-05-02 DIAGNOSIS — Z28.82 VACCINATION REFUSED BY PARENT: ICD-10-CM

## 2024-05-02 DIAGNOSIS — R77.1 ELEVATED SERUM GLOBULIN LEVEL: Chronic | ICD-10-CM

## 2024-05-02 DIAGNOSIS — I10 HYPERTENSION, UNSPECIFIED TYPE: Primary | ICD-10-CM

## 2024-05-02 DIAGNOSIS — R77.1 HYPERGLOBULINEMIA: ICD-10-CM

## 2024-05-02 DIAGNOSIS — F17.200 CURRENT SMOKER: ICD-10-CM

## 2024-05-02 PROCEDURE — 3078F DIAST BP <80 MM HG: CPT | Mod: CPTII,,, | Performed by: FAMILY MEDICINE

## 2024-05-02 PROCEDURE — 1159F MED LIST DOCD IN RCRD: CPT | Mod: CPTII,,, | Performed by: FAMILY MEDICINE

## 2024-05-02 PROCEDURE — 4010F ACE/ARB THERAPY RXD/TAKEN: CPT | Mod: CPTII,,, | Performed by: FAMILY MEDICINE

## 2024-05-02 PROCEDURE — 3008F BODY MASS INDEX DOCD: CPT | Mod: CPTII,,, | Performed by: FAMILY MEDICINE

## 2024-05-02 PROCEDURE — 3075F SYST BP GE 130 - 139MM HG: CPT | Mod: CPTII,,, | Performed by: FAMILY MEDICINE

## 2024-05-02 PROCEDURE — 3044F HG A1C LEVEL LT 7.0%: CPT | Mod: CPTII,,, | Performed by: FAMILY MEDICINE

## 2024-05-02 PROCEDURE — 84165 PROTEIN E-PHORESIS SERUM: CPT | Mod: 26,,, | Performed by: PATHOLOGY

## 2024-05-02 PROCEDURE — 99213 OFFICE O/P EST LOW 20 MIN: CPT | Mod: ,,, | Performed by: FAMILY MEDICINE

## 2024-05-02 PROCEDURE — 84165 PROTEIN E-PHORESIS SERUM: CPT | Mod: ,,, | Performed by: CLINICAL MEDICAL LABORATORY

## 2024-05-02 NOTE — PROGRESS NOTES
Subjective:       Patient ID: Horacio Coello is a 59 y.o. male.    Chief Complaint: Hyperlipidemia and Hypertension    HPI    Patient is a 56 yo male with a medical history of hypertension, hyperlipidemia, nicotine dependence  who presents to the clinic for follow up and medication refills. Patient stated that he is doing good and has no concern. Blood pressure is controlled at 137/72 with a pulse of 94 under the current medical plan with Amlodipine 10 mg daily, HCTZ 25 mg BID, and Losartan 50 mg daily.. He denies headache, fever, chills, chest pain, shortness of breath, palpitations, dizziness, nausea, vomiting, diarrhea, constipation and abdominal pain. His lab CMP was significant for hyperglobulinemia so we will perform protein serum electrophoresis today to follow up on that abnormal lab. Otherwise, the patient is doing well and has no other concerns or complaints. He is a current smoker and currently smokes about 0.5 pack a day from 1 pack pack a day.       Current Outpatient Medications:     amLODIPine (NORVASC) 10 MG tablet, Take 1 tablet (10 mg total) by mouth once daily., Disp: 90 tablet, Rfl: 1    atorvastatin (LIPITOR) 40 MG tablet, Take 1 tablet (40 mg total) by mouth once daily., Disp: 90 tablet, Rfl: 1    hydroCHLOROthiazide (HYDRODIURIL) 25 MG tablet, Take 1 tablet (25 mg total) by mouth 2 (two) times daily., Disp: 180 tablet, Rfl: 1    latanoprost 0.005 % ophthalmic solution, Place 1 drop into both eyes every evening., Disp: , Rfl:     losartan (COZAAR) 50 MG tablet, Take 1 tablet (50 mg total) by mouth once daily., Disp: 90 tablet, Rfl: 1    Review of patient's allergies indicates:  No Known Allergies    Past Medical History:   Diagnosis Date    Hepatitis C infection 4- 6 years ago    Hypertension     Obesity     Unspecified cirrhosis of liver        History reviewed. No pertinent surgical history.    Family History   Problem Relation Name Age of Onset    Diabetes Mother      Hypertension Father           Also Gout       Social History     Tobacco Use    Smoking status: Every Day     Current packs/day: 1.00     Average packs/day: 1 pack/day for 20.0 years (20.0 ttl pk-yrs)     Types: Cigarettes    Smokeless tobacco: Never   Substance Use Topics    Alcohol use: Not Currently    Drug use: Never       Review of Systems   Constitutional:  Negative for activity change, appetite change, chills, fatigue and fever.   HENT:  Negative for nasal congestion, mouth dryness, mouth sores, postnasal drip, sneezing, sore throat, tinnitus and trouble swallowing.    Eyes:  Negative for discharge, itching and visual disturbance.   Respiratory:  Negative for cough, chest tightness, shortness of breath, wheezing and stridor.    Cardiovascular:  Negative for chest pain, palpitations, leg swelling and claudication.   Gastrointestinal:  Negative for abdominal pain, blood in stool, constipation, diarrhea, nausea, vomiting and reflux.   Genitourinary:  Negative for hematuria.   Musculoskeletal:  Negative for neck pain and neck stiffness.   Integumentary:  Negative for rash and wound.   Neurological:  Negative for vertigo, tremors, seizures, syncope, speech difficulty, weakness and numbness.   Hematological:  Negative for adenopathy.   Psychiatric/Behavioral:  Negative for agitation, behavioral problems and confusion.          Current Medications:   Medication List with Changes/Refills   Current Medications    AMLODIPINE (NORVASC) 10 MG TABLET    Take 1 tablet (10 mg total) by mouth once daily.       Start Date: 2/1/2024  End Date: 7/30/2024    ATORVASTATIN (LIPITOR) 40 MG TABLET    Take 1 tablet (40 mg total) by mouth once daily.       Start Date: 2/1/2024  End Date: 7/30/2024    HYDROCHLOROTHIAZIDE (HYDRODIURIL) 25 MG TABLET    Take 1 tablet (25 mg total) by mouth 2 (two) times daily.       Start Date: 2/1/2024  End Date: 7/30/2024    LATANOPROST 0.005 % OPHTHALMIC SOLUTION    Place 1 drop into both eyes every evening.       Start  "Date: 1/6/2024  End Date: --    LOSARTAN (COZAAR) 50 MG TABLET    Take 1 tablet (50 mg total) by mouth once daily.       Start Date: 2/1/2024  End Date: 7/30/2024            Objective:        Vitals:    05/02/24 0813 05/02/24 0821   BP: 137/72    BP Location: Left arm    Patient Position: Sitting    BP Method: Medium (Automatic)    Pulse: 100 94   Resp: 16    Temp: 98.2 °F (36.8 °C)    TempSrc: Oral    SpO2: 98%    Weight: 97.1 kg (214 lb)    Height: 5' 8" (1.727 m)        Physical Exam  Vitals and nursing note reviewed.   Constitutional:       General: He is not in acute distress.     Appearance: He is not ill-appearing.   HENT:      Head: Normocephalic and atraumatic.      Right Ear: External ear normal.      Left Ear: External ear normal.      Nose: Nose normal.      Mouth/Throat:      Mouth: Mucous membranes are moist.      Pharynx: Oropharynx is clear. No oropharyngeal exudate or posterior oropharyngeal erythema.   Eyes:      General: No scleral icterus.        Right eye: No discharge.         Left eye: No discharge.      Extraocular Movements: Extraocular movements intact.      Conjunctiva/sclera: Conjunctivae normal.      Pupils: Pupils are equal, round, and reactive to light.   Cardiovascular:      Rate and Rhythm: Normal rate and regular rhythm.      Pulses: Normal pulses.      Heart sounds: Normal heart sounds. No murmur heard.     No friction rub.   Pulmonary:      Effort: Pulmonary effort is normal. No respiratory distress.      Breath sounds: No wheezing, rhonchi or rales.   Abdominal:      Palpations: Abdomen is soft.      Tenderness: There is no abdominal tenderness. There is no guarding or rebound.   Musculoskeletal:         General: No swelling. Normal range of motion.      Cervical back: Neck supple. No tenderness.      Right lower leg: No edema.      Left lower leg: No edema.   Lymphadenopathy:      Cervical: No cervical adenopathy.   Skin:     General: Skin is warm.      Coloration: Skin is not " jaundiced.   Neurological:      Mental Status: He is alert and oriented to person, place, and time. Mental status is at baseline.   Psychiatric:         Mood and Affect: Mood normal.         Behavior: Behavior normal.         Thought Content: Thought content normal.         Judgment: Judgment normal.             Lab Results   Component Value Date    WBC 9.65 12/05/2022    HGB 16.2 12/05/2022    HCT 48.6 12/05/2022     12/05/2022    CHOL 145 12/05/2022    TRIG 113 12/05/2022    HDL 35 (L) 12/05/2022    ALT 26 02/01/2024    AST 18 02/01/2024     02/01/2024    K 3.7 02/01/2024     02/01/2024    CREATININE 1.08 02/01/2024    BUN 13 02/01/2024    CO2 32 02/01/2024    TSH 1.530 02/01/2024    HGBA1C 5.8 02/01/2024      Assessment:       1. Hypertension, unspecified type    2. Hyperglobulinemia    3. Current smoker    4. Elevated serum globulin level    5. Vaccination refused by parent        Plan:         Problem List Items Addressed This Visit          Cardiac/Vascular    Hypertension - Primary     - Monitor: we will continue to monitor for any signs of uncontrolled blood pressure    - Evaluation: Physical exam was normal with any signs of end-organ damage and uncontrolled blood pressure     - Assessment: Controlled blood pressure     - Treatment: Continue with current plan with Amlodipine, HCTZ and Losartan. Patient will follow up in 4 weeks and may consider combining Losartan and HCTZ to reduce the number of medications.            ID    Vaccination refused by parent       Other    Elevated serum globulin level (Chronic)     Monitor: We will continue to monitor for liver functions and protein levels     Evaluation: Physical exam was negative for RUQ pain     Assessment: Abnormal lab with elevated globulin level. The etiology is unknown but patient has history of hep C     Treatment: Serum protein electrophoresis was negative and total protein normalized. We will continue to monitor for now. Follow up  in 4 weeks.         Current smoker    Relevant Orders    CT Chest Lung Screening Low Dose     Other Visit Diagnoses       Hyperglobulinemia        Relevant Orders    Protein Electrophoresis, Serum with Reflex DIOGENES (Completed)              Follow up in about 4 weeks (around 5/30/2024).    Sidney Alves MD     Instructed patient that if symptoms fail to improve or worsen patient should seek immediate medical attention or report to the nearest emergency department. Patient expressed verbal agreement and understanding to this plan of care.

## 2024-05-03 LAB
ALBUMIN PE, BLOOD: 4.8 G/DL (ref 3.5–5.2)
ALPHA1 GLOB SERPL ELPH-MCNC: 0.2 G/DL (ref 0.1–0.4)
ALPHA2 GLOB SERPL ELPH-MCNC: 0.8 G/DL (ref 0.4–1.3)
B-GLOBULIN SERPL ELPH-MCNC: 0.8 G/DL (ref 0.5–1.5)
GAMMA GLOB SERPL ELPH-MCNC: 1.2 G/DL (ref 0.5–1.8)
PATH INTERP BLD-IMP: NORMAL
PROT SERPL-MCNC: 7.8 G/DL (ref 6.4–8.2)

## 2024-05-06 VITALS
OXYGEN SATURATION: 98 % | HEART RATE: 94 BPM | HEIGHT: 68 IN | SYSTOLIC BLOOD PRESSURE: 137 MMHG | RESPIRATION RATE: 16 BRPM | TEMPERATURE: 98 F | WEIGHT: 214 LBS | BODY MASS INDEX: 32.43 KG/M2 | DIASTOLIC BLOOD PRESSURE: 72 MMHG

## 2024-05-06 PROBLEM — Z28.82 VACCINATION REFUSED BY PARENT: Status: ACTIVE | Noted: 2024-05-06

## 2024-05-07 NOTE — ASSESSMENT & PLAN NOTE
Monitor: We will continue to monitor for liver functions and protein levels     Evaluation: Physical exam was negative for RUQ pain     Assessment: Abnormal lab with elevated globulin level. The etiology is unknown but patient has history of hep C     Treatment: Serum protein electrophoresis was negative and total protein normalized. We will continue to monitor for now. Follow up in 4 weeks.

## 2024-05-07 NOTE — ASSESSMENT & PLAN NOTE
- Monitor: we will continue to monitor for any signs of uncontrolled blood pressure    - Evaluation: Physical exam was normal with any signs of end-organ damage and uncontrolled blood pressure     - Assessment: Controlled blood pressure     - Treatment: Continue with current plan with Amlodipine, HCTZ and Losartan. Patient will follow up in 4 weeks and may consider combining Losartan and HCTZ to reduce the number of medications.

## 2024-05-07 NOTE — ASSESSMENT & PLAN NOTE
- Monitor: we will continue to monitor for any signs of uncontrolled blood pressure    - Evaluation: Physical exam was normal with any signs of end-organ damage and uncontrolled blood pressure     - Assessment: Controlled blood pressure     - Treatment: Continue with current plan with Amlodipine, HCTZ and Losartan

## 2024-05-08 NOTE — PROGRESS NOTES
I have reviewed the notes, assessments, and/or procedures performed by DR Alves, I concur with his documentation of Horacio Coello.  Date of Service: 5/2/2024  Cardiac/Vascular      Hypertension - Primary       - Monitor: we will continue to monitor for any signs of uncontrolled blood pressure     - Evaluation: Physical exam was normal with any signs of end-organ damage and uncontrolled blood pressure     - Assessment: Controlled blood pressure     - Treatment: Continue with current plan with Amlodipine, HCTZ and Losartan. Patient will follow up in 4 weeks and may consider combining Losartan and HCTZ to reduce the number of medications.                ID     Vaccination refused by parent          Other     Elevated serum globulin level (Chronic)       Monitor: We will continue to monitor for liver functions and protein levels     Evaluation: Physical exam was negative for RUQ pain     Assessment: Abnormal lab with elevated globulin level. The etiology is unknown but patient has history of hep C     Treatment: Serum protein electrophoresis was negative and total protein normalized. We will continue to monitor for now. Follow up in 4 weeks.           Current smoker     Relevant Orders     CT Chest Lung Screening Low Dose      Other Visit Diagnoses         Hyperglobulinemia         Relevant Orders     Protein Electrophoresis, Serum with Reflex DIOGENES (Completed)                Follow up in about 4 weeks (around 5/30/2024).     Sidney Alves MD      Instructed patient that if symptoms fail to improve or worsen patient should seek immediate medical attention or report to the nearest emergency department. Patient expressed verbal agreement and understanding to this plan of care.

## 2024-05-30 ENCOUNTER — OFFICE VISIT (OUTPATIENT)
Dept: FAMILY MEDICINE | Facility: CLINIC | Age: 59
End: 2024-05-30
Payer: COMMERCIAL

## 2024-05-30 VITALS
DIASTOLIC BLOOD PRESSURE: 86 MMHG | RESPIRATION RATE: 18 BRPM | BODY MASS INDEX: 33.04 KG/M2 | SYSTOLIC BLOOD PRESSURE: 158 MMHG | WEIGHT: 218 LBS | HEART RATE: 102 BPM | HEIGHT: 68 IN | OXYGEN SATURATION: 99 % | TEMPERATURE: 98 F

## 2024-05-30 DIAGNOSIS — I10 PRIMARY HYPERTENSION: Chronic | ICD-10-CM

## 2024-05-30 DIAGNOSIS — E78.5 HYPERLIPIDEMIA, UNSPECIFIED HYPERLIPIDEMIA TYPE: ICD-10-CM

## 2024-05-30 DIAGNOSIS — Z12.5 SCREENING FOR PROSTATE CANCER: ICD-10-CM

## 2024-05-30 DIAGNOSIS — I10 HYPERTENSION, UNSPECIFIED TYPE: Primary | ICD-10-CM

## 2024-05-30 PROCEDURE — 4010F ACE/ARB THERAPY RXD/TAKEN: CPT | Mod: CPTII,,, | Performed by: FAMILY MEDICINE

## 2024-05-30 PROCEDURE — 3044F HG A1C LEVEL LT 7.0%: CPT | Mod: CPTII,,, | Performed by: FAMILY MEDICINE

## 2024-05-30 PROCEDURE — 1159F MED LIST DOCD IN RCRD: CPT | Mod: CPTII,,, | Performed by: FAMILY MEDICINE

## 2024-05-30 PROCEDURE — 3077F SYST BP >= 140 MM HG: CPT | Mod: CPTII,,, | Performed by: FAMILY MEDICINE

## 2024-05-30 PROCEDURE — 3008F BODY MASS INDEX DOCD: CPT | Mod: CPTII,,, | Performed by: FAMILY MEDICINE

## 2024-05-30 PROCEDURE — 3079F DIAST BP 80-89 MM HG: CPT | Mod: CPTII,,, | Performed by: FAMILY MEDICINE

## 2024-05-30 PROCEDURE — 99214 OFFICE O/P EST MOD 30 MIN: CPT | Mod: ,,, | Performed by: FAMILY MEDICINE

## 2024-05-30 NOTE — ASSESSMENT & PLAN NOTE
- Monitor: we will continue to monitor for any signs of uncontrolled blood   pressure   - Evaluation: Physical exam was normal with any signs of end-organ damage and uncontrolled blood pressure     - Assessment: Controlled blood pressure     - Treatment: Continue with current plan with Amlodipine, HCTZ and Losartan. Patient will follow up in 4 weeks and may consider combining Losartan and HCTZ to reduce the number of medications

## 2024-05-30 NOTE — PROGRESS NOTES
Subjective:       Patient ID: Horacio Coello is a 59 y.o. male.    Chief Complaint: Hypertension (Follow up X4 weeks )    HPI    Patient is a 56 yo male with a medical history of hypertension, hyperlipidemia, nicotine dependence  who presents to the clinic for follow up and medication refills. Patient stated that he is doing good and has no concern. Blood pressure as checked at the office is 152/80 with a pulse of 98. He is currently on Amlodipine 10 mg daily, HCTZ 25 mg BID and Losartan 50 mg daily. Blood pressure log shows home systolic blood pressure ranging from 135 to 145.  He denies headache, fever, chills, chest pain, shortness of breath, palpitations, dizziness, nausea, vomiting, diarrhea, constipation and abdominal pain. He has a history of JORGE on CPAP but has not been very compliant with this therapy.  Preventive measure - Routine labs are ordered to be performed in the next visit CBC, BMP, Lipid panel, urinalysis and PSA    Current Outpatient Medications:     amLODIPine (NORVASC) 10 MG tablet, Take 1 tablet (10 mg total) by mouth once daily., Disp: 90 tablet, Rfl: 1    atorvastatin (LIPITOR) 40 MG tablet, Take 1 tablet (40 mg total) by mouth once daily., Disp: 90 tablet, Rfl: 1    hydroCHLOROthiazide (HYDRODIURIL) 25 MG tablet, Take 1 tablet (25 mg total) by mouth 2 (two) times daily., Disp: 180 tablet, Rfl: 1    latanoprost 0.005 % ophthalmic solution, Place 1 drop into both eyes every evening., Disp: , Rfl:     losartan (COZAAR) 50 MG tablet, Take 2 tablets (100 mg total) by mouth once daily., Disp: , Rfl:     Review of patient's allergies indicates:  No Known Allergies    Past Medical History:   Diagnosis Date    Hepatitis C infection 4- 6 years ago    Hypertension     Obesity     Unspecified cirrhosis of liver        No past surgical history on file.    Family History   Problem Relation Name Age of Onset    Diabetes Mother      Hypertension Father          Also Gout       Social History      Tobacco Use    Smoking status: Every Day     Current packs/day: 1.00     Average packs/day: 1 pack/day for 20.0 years (20.0 ttl pk-yrs)     Types: Cigarettes    Smokeless tobacco: Never   Substance Use Topics    Alcohol use: Not Currently    Drug use: Never       Review of Systems   Constitutional:  Negative for activity change, appetite change, chills, fatigue and fever.   HENT:  Negative for nasal congestion, mouth dryness, mouth sores, postnasal drip, sneezing, sore throat, tinnitus and trouble swallowing.    Eyes:  Negative for discharge, itching and visual disturbance.   Respiratory:  Negative for cough, chest tightness, shortness of breath, wheezing and stridor.    Cardiovascular:  Negative for chest pain, palpitations, leg swelling and claudication.   Gastrointestinal:  Negative for abdominal pain, blood in stool, constipation, diarrhea, nausea, vomiting and reflux.   Genitourinary:  Negative for hematuria.   Musculoskeletal:  Negative for neck pain and neck stiffness.   Integumentary:  Negative for rash and wound.   Neurological:  Negative for vertigo, tremors, seizures, syncope, speech difficulty, weakness and numbness.   Hematological:  Negative for adenopathy.   Psychiatric/Behavioral:  Negative for agitation, behavioral problems and confusion.          Current Medications:   Medication List with Changes/Refills   Current Medications    AMLODIPINE (NORVASC) 10 MG TABLET    Take 1 tablet (10 mg total) by mouth once daily.       Start Date: 2/1/2024  End Date: 7/30/2024    ATORVASTATIN (LIPITOR) 40 MG TABLET    Take 1 tablet (40 mg total) by mouth once daily.       Start Date: 2/1/2024  End Date: 7/30/2024    HYDROCHLOROTHIAZIDE (HYDRODIURIL) 25 MG TABLET    Take 1 tablet (25 mg total) by mouth 2 (two) times daily.       Start Date: 2/1/2024  End Date: 7/30/2024    LATANOPROST 0.005 % OPHTHALMIC SOLUTION    Place 1 drop into both eyes every evening.       Start Date: 1/6/2024  End Date: --   Changed  "and/or Refilled Medications    Modified Medication Previous Medication    LOSARTAN (COZAAR) 50 MG TABLET losartan (COZAAR) 50 MG tablet       Take 2 tablets (100 mg total) by mouth once daily.    Take 1 tablet (50 mg total) by mouth once daily.       Start Date: 6/3/2024  End Date: 11/30/2024    Start Date: 2/1/2024  End Date: 6/3/2024            Objective:        Vitals:    05/30/24 0825 05/30/24 0846   BP: (!) 177/99 (!) 158/86   BP Location: Left arm Left arm   Patient Position: Sitting Sitting   BP Method: Large (Automatic) Large (Manual)   Pulse: 102    Resp: 18    Temp: 98.1 °F (36.7 °C)    TempSrc: Oral    SpO2: 99%    Weight: 98.9 kg (218 lb)    Height: 5' 8" (1.727 m)        Physical Exam  Vitals and nursing note reviewed.   Constitutional:       General: He is not in acute distress.     Appearance: He is not ill-appearing.   HENT:      Head: Normocephalic and atraumatic.      Right Ear: External ear normal.      Left Ear: External ear normal.      Nose: Nose normal.      Mouth/Throat:      Mouth: Mucous membranes are moist.      Pharynx: Oropharynx is clear. No oropharyngeal exudate or posterior oropharyngeal erythema.   Eyes:      General: No scleral icterus.        Right eye: No discharge.         Left eye: No discharge.      Extraocular Movements: Extraocular movements intact.      Conjunctiva/sclera: Conjunctivae normal.      Pupils: Pupils are equal, round, and reactive to light.   Cardiovascular:      Rate and Rhythm: Normal rate and regular rhythm.      Pulses: Normal pulses.      Heart sounds: Normal heart sounds. No murmur heard.     No friction rub.   Pulmonary:      Effort: Pulmonary effort is normal. No respiratory distress.      Breath sounds: No wheezing, rhonchi or rales.   Abdominal:      Palpations: Abdomen is soft.      Tenderness: There is no abdominal tenderness. There is no guarding or rebound.   Musculoskeletal:         General: No swelling. Normal range of motion.      Cervical back: " Neck supple. No tenderness.      Right lower leg: No edema.      Left lower leg: No edema.   Lymphadenopathy:      Cervical: No cervical adenopathy.   Skin:     General: Skin is warm.      Coloration: Skin is not jaundiced.   Neurological:      Mental Status: He is alert and oriented to person, place, and time. Mental status is at baseline.   Psychiatric:         Mood and Affect: Mood normal.         Behavior: Behavior normal.         Thought Content: Thought content normal.         Judgment: Judgment normal.             Lab Results   Component Value Date    WBC 9.65 12/05/2022    HGB 16.2 12/05/2022    HCT 48.6 12/05/2022     12/05/2022    CHOL 145 12/05/2022    TRIG 113 12/05/2022    HDL 35 (L) 12/05/2022    ALT 26 02/01/2024    AST 18 02/01/2024     02/01/2024    K 3.7 02/01/2024     02/01/2024    CREATININE 1.08 02/01/2024    BUN 13 02/01/2024    CO2 32 02/01/2024    TSH 1.530 02/01/2024    HGBA1C 5.8 02/01/2024      Assessment:       1. Hypertension, unspecified type    2. Hyperlipidemia, unspecified hyperlipidemia type    3. Screening for prostate cancer    4. Primary hypertension        Plan:         Problem List Items Addressed This Visit          Cardiac/Vascular    Primary hypertension (Chronic)     - Blood pressure is uncontrolled at 152/80 with a pulse of 98  - We will continue to monitor for symptoms of uncontrolled blood pressure  - Will continue with Amllodipine 10 mg daily, HCTZ 25 mg BID  - Increase Losartan to 100 mg daily from 50 mg daily  - Will continue to monitor and adjust medications if indicated         Hypertension - Primary       - Monitor: we will continue to monitor for any signs of uncontrolled blood   pressure   - Evaluation: Physical exam was normal with any signs of end-organ damage and uncontrolled blood pressure     - Assessment: Controlled blood pressure     - Treatment: Continue with current plan with Amlodipine, HCTZ and Losartan. Patient will follow up in 4  weeks and may consider combining Losartan and HCTZ to reduce the number of medications            Relevant Medications    losartan (COZAAR) 50 MG tablet    Other Relevant Orders    CBC Auto Differential    Urinalysis    Basic Metabolic Panel    Hyperlipidemia    Relevant Orders    Lipid Panel     Other Visit Diagnoses       Screening for prostate cancer        Relevant Orders    PSA, Total and Free              Follow up in about 4 weeks (around 6/27/2024).    Sidney Alves MD     Instructed patient that if symptoms fail to improve or worsen patient should seek immediate medical attention or report to the nearest emergency department. Patient expressed verbal agreement and understanding to this plan of care.

## 2024-06-03 RX ORDER — LOSARTAN POTASSIUM 50 MG/1
100 TABLET ORAL DAILY
Start: 2024-06-03 | End: 2024-11-30

## 2024-06-04 NOTE — ASSESSMENT & PLAN NOTE
- Blood pressure is uncontrolled at 152/80 with a pulse of 98  - We will continue to monitor for symptoms of uncontrolled blood pressure  - Will continue with Amllodipine 10 mg daily, HCTZ 25 mg BID  - Increase Losartan to 100 mg daily from 50 mg daily  - Will continue to monitor and adjust medications if indicated

## 2024-06-12 ENCOUNTER — HOSPITAL ENCOUNTER (OUTPATIENT)
Dept: RADIOLOGY | Facility: HOSPITAL | Age: 59
Discharge: HOME | End: 2024-06-12
Attending: FAMILY MEDICINE
Payer: COMMERCIAL

## 2024-06-12 VITALS — HEIGHT: 68 IN | WEIGHT: 200 LBS | BODY MASS INDEX: 30.31 KG/M2

## 2024-06-12 DIAGNOSIS — F17.200 CURRENT SMOKER: ICD-10-CM

## 2024-06-12 PROCEDURE — 71271 CT THORAX LUNG CANCER SCR C-: CPT | Mod: 26,,, | Performed by: RADIOLOGY

## 2024-06-12 PROCEDURE — 71271 CT THORAX LUNG CANCER SCR C-: CPT | Mod: TC

## 2024-06-20 DIAGNOSIS — I10 HYPERTENSION, UNSPECIFIED TYPE: ICD-10-CM

## 2024-06-20 RX ORDER — LOSARTAN POTASSIUM 50 MG/1
100 TABLET ORAL DAILY
Status: CANCELLED
Start: 2024-06-20 | End: 2024-12-17

## 2024-06-20 NOTE — TELEPHONE ENCOUNTER
----- Message from Sophie Montemayor sent at 6/19/2024 12:00 PM CDT -----  losartan (COZAAR) 50 MG tablet [facility] PT [phone]

## 2024-06-25 NOTE — PROGRESS NOTES
Subjective:       Patient ID: Horacio Coello is a 59 y.o. male.    Chief Complaint: No chief complaint on file.    HPI Patient is a 58 yo male with PMH of essential HTN, HLD, and JORGE on CPAP. Patient here for follow-up of elevated blood pressure. He is not exercising and is not adherent to low salt diet.  Blood pressure is well controlled at home. Cardiac symptoms none. Patient denies chest pain, chest pressure/discomfort, dyspnea, exertional chest pressure/discomfort, fatigue, irregular heart beat, lower extremity edema, near-syncope, orthopnea, and palpitations.  Cardiovascular risk factors: advanced age (older than 55 for men, 65 for women), hypertension, male gender, obesity (BMI >= 30 kg/m2), sedentary lifestyle, and smoking/ tobacco exposure. Use of agents associated with hypertension: none. History of target organ damage: none. During last visit Losartan increased 50 mg bid. Blood pressure at that time 158/86. Currently on HCTZ 25 mg bid, Amlodipine 10 mg qd, and Losartan 50 mg bid increased from 50 mg daily for better bp control. Home blood pressure reading ranging from 110-120/60-80.             Current Outpatient Medications:     amLODIPine (NORVASC) 10 MG tablet, Take 1 tablet (10 mg total) by mouth once daily., Disp: 90 tablet, Rfl: 1    atorvastatin (LIPITOR) 40 MG tablet, Take 1 tablet (40 mg total) by mouth once daily., Disp: 90 tablet, Rfl: 1    hydroCHLOROthiazide (HYDRODIURIL) 25 MG tablet, Take 1 tablet (25 mg total) by mouth 2 (two) times daily., Disp: 180 tablet, Rfl: 1    latanoprost 0.005 % ophthalmic solution, Place 1 drop into both eyes every evening., Disp: , Rfl:     losartan (COZAAR) 50 MG tablet, Take 2 tablets (100 mg total) by mouth once daily., Disp: , Rfl:     Review of patient's allergies indicates:  No Known Allergies    Past Medical History:   Diagnosis Date    Hepatitis C infection 4- 6 years ago    Hypertension     Obesity     Unspecified cirrhosis of liver        No past  surgical history on file.    Family History   Problem Relation Name Age of Onset    Diabetes Mother      Hypertension Father          Also Gout       Social History     Tobacco Use    Smoking status: Every Day     Current packs/day: 1.00     Average packs/day: 1 pack/day for 20.0 years (20.0 ttl pk-yrs)     Types: Cigarettes    Smokeless tobacco: Never   Substance Use Topics    Alcohol use: Not Currently    Drug use: Never       Review of Systems   Constitutional: Negative.    HENT: Negative.     Eyes:  Negative for visual disturbance.   Respiratory:  Negative for cough, chest tightness and shortness of breath.    Cardiovascular:  Negative for chest pain, palpitations and leg swelling.   Gastrointestinal:  Negative for abdominal pain, nausea and vomiting.   Genitourinary: Negative.    Integumentary:  Negative.           Objective:      There were no vitals filed for this visit.  Physical Exam  Vitals reviewed.   Constitutional:       General: He is not in acute distress.     Appearance: Normal appearance. He is not ill-appearing.   Eyes:      Conjunctiva/sclera: Conjunctivae normal.   Cardiovascular:      Pulses: Normal pulses.      Heart sounds: Normal heart sounds. No murmur heard.     No friction rub. No gallop.   Pulmonary:      Effort: Pulmonary effort is normal.      Breath sounds: No wheezing, rhonchi or rales.   Abdominal:      General: Bowel sounds are normal.      Palpations: Abdomen is soft.   Skin:     General: Skin is warm and dry.   Neurological:      Mental Status: He is alert.           Lab Results   Component Value Date    WBC 9.65 12/05/2022    HGB 16.2 12/05/2022    HCT 48.6 12/05/2022     12/05/2022    CHOL 145 12/05/2022    TRIG 113 12/05/2022    HDL 35 (L) 12/05/2022    ALT 26 02/01/2024    AST 18 02/01/2024     02/01/2024    K 3.7 02/01/2024     02/01/2024    CREATININE 1.08 02/01/2024    BUN 13 02/01/2024    CO2 32 02/01/2024    TSH 1.530 02/01/2024    HGBA1C 5.8 02/01/2024       Assessment:       1. Primary hypertension    2. Hyperlipidemia, unspecified hyperlipidemia type    3. Essential hypertension    4. Current smoker        Plan:         Problem List Items Addressed This Visit          Cardiac/Vascular    Hypertension - Primary     Will continue to monitor for signs and symptoms of uncontrolled hypertension including TIA, stroke, retinopathy, and LVH. Denies any chest pain, sob, abdominal pain, n/v, blurry vision, or episodes of lightheadedness, or syncopal episodes. Today, we recheck bp 118/60 Will continue current management with Amlodipine, HCTZ, and Losartan. Refilling amlodipine, and HCTZ today. Patient just refilled Losartan.           Hyperlipidemia     Will monitor for signs and symptoms of CAD and PVD including chest pain, sob, lower extremity weakness, and pain. On exam today, denies any fatigue, weakness, pain, or chest pain. Repeat lipid panel order during last visit. Will obtain labs today and discussed lifestyle modifications including exercise and low fat diet. Will provide patient with education materials. Continue Lipitor. Patient scheduled for labs in 1 week.          Relevant Medications    atorvastatin (LIPITOR) 40 MG tablet       Other    Current smoker     Assistance with smoking cessation was offered, including:  []  Medications  [x]  Counseling  []  Printed Information on Smoking Cessation  []  Referral to a Smoking Cessation Program    Patient was counseled regarding smoking for 3-10 minutes.              Other Visit Diagnoses       Essential hypertension        Relevant Medications    amLODIPine (NORVASC) 10 MG tablet    hydroCHLOROthiazide (HYDRODIURIL) 25 MG tablet              Follow up in about 3 months (around 9/27/2024).    Shiela Lopez MD

## 2024-06-26 NOTE — ASSESSMENT & PLAN NOTE
Will continue to monitor for signs and symptoms of uncontrolled hypertension including TIA, stroke, retinopathy, and LVH. Denies any chest pain, sob, abdominal pain, n/v, blurry vision, or episodes of lightheadedness, or syncopal episodes. Today, we recheck bp 118/60 Will continue current management with Amlodipine, HCTZ, and Losartan. Refilling amlodipine, and HCTZ today. Patient just refilled Losartan.

## 2024-06-26 NOTE — ASSESSMENT & PLAN NOTE
Will monitor for signs and symptoms of CAD and PVD including chest pain, sob, lower extremity weakness, and pain. On exam today, denies any fatigue, weakness, pain, or chest pain. Repeat lipid panel order during last visit. Will obtain labs today and discussed lifestyle modifications including exercise and low fat diet. Will provide patient with education materials. Continue Lipitor. Patient scheduled for labs in 1 week.

## 2024-06-27 ENCOUNTER — OFFICE VISIT (OUTPATIENT)
Dept: FAMILY MEDICINE | Facility: CLINIC | Age: 59
End: 2024-06-27
Payer: COMMERCIAL

## 2024-06-27 VITALS
BODY MASS INDEX: 32.74 KG/M2 | OXYGEN SATURATION: 100 % | HEART RATE: 100 BPM | DIASTOLIC BLOOD PRESSURE: 60 MMHG | RESPIRATION RATE: 18 BRPM | HEIGHT: 68 IN | WEIGHT: 216 LBS | SYSTOLIC BLOOD PRESSURE: 118 MMHG

## 2024-06-27 DIAGNOSIS — I10 PRIMARY HYPERTENSION: Primary | ICD-10-CM

## 2024-06-27 DIAGNOSIS — I10 ESSENTIAL HYPERTENSION: ICD-10-CM

## 2024-06-27 DIAGNOSIS — E78.5 HYPERLIPIDEMIA, UNSPECIFIED HYPERLIPIDEMIA TYPE: ICD-10-CM

## 2024-06-27 DIAGNOSIS — F17.200 CURRENT SMOKER: ICD-10-CM

## 2024-06-27 RX ORDER — ATORVASTATIN CALCIUM 40 MG/1
40 TABLET, FILM COATED ORAL DAILY
Qty: 90 TABLET | Refills: 1 | Status: SHIPPED | OUTPATIENT
Start: 2024-06-27 | End: 2024-12-24

## 2024-06-27 RX ORDER — AMLODIPINE BESYLATE 10 MG/1
10 TABLET ORAL DAILY
Qty: 90 TABLET | Refills: 1 | Status: SHIPPED | OUTPATIENT
Start: 2024-06-27 | End: 2024-12-24

## 2024-06-27 RX ORDER — HYDROCHLOROTHIAZIDE 25 MG/1
25 TABLET ORAL 2 TIMES DAILY
Qty: 180 TABLET | Refills: 1 | Status: SHIPPED | OUTPATIENT
Start: 2024-06-27 | End: 2024-12-24

## 2024-08-22 ENCOUNTER — PATIENT MESSAGE (OUTPATIENT)
Dept: FAMILY MEDICINE | Facility: CLINIC | Age: 59
End: 2024-08-22
Payer: COMMERCIAL

## 2024-08-27 DIAGNOSIS — I10 ESSENTIAL HYPERTENSION: ICD-10-CM

## 2024-08-27 NOTE — TELEPHONE ENCOUNTER
----- Message from Sophie Montemayor sent at 8/27/2024  4:03 PM CDT -----  B/p med [facility] [phone]

## 2024-09-12 DIAGNOSIS — E87.6 HYPOKALEMIA: Primary | ICD-10-CM

## 2024-09-12 RX ORDER — AMLODIPINE BESYLATE 10 MG/1
10 TABLET ORAL DAILY
Qty: 90 TABLET | Refills: 1 | Status: SHIPPED | OUTPATIENT
Start: 2024-09-12 | End: 2025-03-11

## 2024-09-24 ENCOUNTER — OFFICE VISIT (OUTPATIENT)
Dept: FAMILY MEDICINE | Facility: CLINIC | Age: 59
End: 2024-09-24
Payer: COMMERCIAL

## 2024-09-24 VITALS
BODY MASS INDEX: 31.98 KG/M2 | SYSTOLIC BLOOD PRESSURE: 160 MMHG | WEIGHT: 211 LBS | OXYGEN SATURATION: 100 % | DIASTOLIC BLOOD PRESSURE: 80 MMHG | HEIGHT: 68 IN | HEART RATE: 108 BPM | RESPIRATION RATE: 20 BRPM

## 2024-09-24 DIAGNOSIS — Z29.9 PREVENTIVE MEASURE: ICD-10-CM

## 2024-09-24 DIAGNOSIS — I10 ESSENTIAL HYPERTENSION: ICD-10-CM

## 2024-09-24 DIAGNOSIS — Z00.00 HEALTH CARE MAINTENANCE: ICD-10-CM

## 2024-09-24 DIAGNOSIS — F17.200 CURRENT SMOKER: ICD-10-CM

## 2024-09-24 DIAGNOSIS — Z23 COVID-19 VACCINE ADMINISTERED: ICD-10-CM

## 2024-09-24 DIAGNOSIS — E78.2 MIXED HYPERLIPIDEMIA: Chronic | ICD-10-CM

## 2024-09-24 DIAGNOSIS — Z23 INFLUENZA VACCINE NEEDED: ICD-10-CM

## 2024-09-24 DIAGNOSIS — I10 HYPERTENSION, UNSPECIFIED TYPE: ICD-10-CM

## 2024-09-24 DIAGNOSIS — I10 PRIMARY HYPERTENSION: Primary | Chronic | ICD-10-CM

## 2024-09-24 LAB
ANION GAP SERPL CALCULATED.3IONS-SCNC: 9 MMOL/L (ref 7–16)
BASOPHILS # BLD AUTO: 0.04 K/UL (ref 0–0.2)
BASOPHILS NFR BLD AUTO: 0.4 % (ref 0–1)
BILIRUB UR QL STRIP: NEGATIVE
BUN SERPL-MCNC: 13 MG/DL (ref 7–18)
BUN/CREAT SERPL: 14 (ref 6–20)
CALCIUM SERPL-MCNC: 9.6 MG/DL (ref 8.5–10.1)
CHLORIDE SERPL-SCNC: 99 MMOL/L (ref 98–107)
CHOLEST SERPL-MCNC: 117 MG/DL (ref 0–200)
CHOLEST/HDLC SERPL: 3.8 {RATIO}
CLARITY UR: CLEAR
CO2 SERPL-SCNC: 33 MMOL/L (ref 21–32)
COLOR UR: NORMAL
CREAT SERPL-MCNC: 0.96 MG/DL (ref 0.7–1.3)
DIFFERENTIAL METHOD BLD: ABNORMAL
EGFR (NO RACE VARIABLE) (RUSH/TITUS): 90 ML/MIN/1.73M2
EOSINOPHIL # BLD AUTO: 0.16 K/UL (ref 0–0.5)
EOSINOPHIL NFR BLD AUTO: 1.6 % (ref 1–4)
ERYTHROCYTE [DISTWIDTH] IN BLOOD BY AUTOMATED COUNT: 13.2 % (ref 11.5–14.5)
GLUCOSE SERPL-MCNC: 97 MG/DL (ref 74–106)
GLUCOSE UR STRIP-MCNC: NORMAL MG/DL
HCT VFR BLD AUTO: 47.2 % (ref 40–54)
HDLC SERPL-MCNC: 31 MG/DL (ref 40–60)
HGB BLD-MCNC: 15.6 G/DL (ref 13.5–18)
IMM GRANULOCYTES # BLD AUTO: 0.04 K/UL (ref 0–0.04)
IMM GRANULOCYTES NFR BLD: 0.4 % (ref 0–0.4)
KETONES UR STRIP-SCNC: NEGATIVE MG/DL
LDLC SERPL CALC-MCNC: 50 MG/DL
LDLC/HDLC SERPL: 1.6 {RATIO}
LEUKOCYTE ESTERASE UR QL STRIP: NEGATIVE
LYMPHOCYTES # BLD AUTO: 3.45 K/UL (ref 1–4.8)
LYMPHOCYTES NFR BLD AUTO: 35.3 % (ref 27–41)
MCH RBC QN AUTO: 30.6 PG (ref 27–31)
MCHC RBC AUTO-ENTMCNC: 33.1 G/DL (ref 32–36)
MCV RBC AUTO: 92.7 FL (ref 80–96)
MONOCYTES # BLD AUTO: 0.62 K/UL (ref 0–0.8)
MONOCYTES NFR BLD AUTO: 6.3 % (ref 2–6)
MPC BLD CALC-MCNC: 12.6 FL (ref 9.4–12.4)
NEUTROPHILS # BLD AUTO: 5.46 K/UL (ref 1.8–7.7)
NEUTROPHILS NFR BLD AUTO: 56 % (ref 53–65)
NITRITE UR QL STRIP: NEGATIVE
NONHDLC SERPL-MCNC: 86 MG/DL
NRBC # BLD AUTO: 0 X10E3/UL
NRBC, AUTO (.00): 0 %
PH UR STRIP: 6.5 PH UNITS
PLATELET # BLD AUTO: 209 K/UL (ref 150–400)
POTASSIUM SERPL-SCNC: 2.7 MMOL/L (ref 3.5–5.1)
PROT UR QL STRIP: NEGATIVE
PSA SERPL-MCNC: 1.35 NG/ML
RBC # BLD AUTO: 5.09 M/UL (ref 4.6–6.2)
RBC # UR STRIP: NEGATIVE /UL
SODIUM SERPL-SCNC: 138 MMOL/L (ref 136–145)
SP GR UR STRIP: 1.02
TRIGL SERPL-MCNC: 181 MG/DL (ref 35–150)
UROBILINOGEN UR STRIP-ACNC: NORMAL MG/DL
VLDLC SERPL-MCNC: 36 MG/DL
WBC # BLD AUTO: 9.77 K/UL (ref 4.5–11)

## 2024-09-24 PROCEDURE — 80061 LIPID PANEL: CPT | Mod: ,,, | Performed by: CLINICAL MEDICAL LABORATORY

## 2024-09-24 PROCEDURE — 80048 BASIC METABOLIC PNL TOTAL CA: CPT | Mod: ,,, | Performed by: CLINICAL MEDICAL LABORATORY

## 2024-09-24 PROCEDURE — 3079F DIAST BP 80-89 MM HG: CPT | Mod: CPTII,,, | Performed by: FAMILY MEDICINE

## 2024-09-24 PROCEDURE — G0103 PSA SCREENING: HCPCS | Mod: ,,, | Performed by: CLINICAL MEDICAL LABORATORY

## 2024-09-24 PROCEDURE — 90656 IIV3 VACC NO PRSV 0.5 ML IM: CPT | Mod: GC,,, | Performed by: FAMILY MEDICINE

## 2024-09-24 PROCEDURE — 90471 IMMUNIZATION ADMIN: CPT | Mod: GC,,, | Performed by: FAMILY MEDICINE

## 2024-09-24 PROCEDURE — 4010F ACE/ARB THERAPY RXD/TAKEN: CPT | Mod: CPTII,,, | Performed by: FAMILY MEDICINE

## 2024-09-24 PROCEDURE — 3044F HG A1C LEVEL LT 7.0%: CPT | Mod: CPTII,,, | Performed by: FAMILY MEDICINE

## 2024-09-24 PROCEDURE — 81003 URINALYSIS AUTO W/O SCOPE: CPT | Mod: QW,,, | Performed by: CLINICAL MEDICAL LABORATORY

## 2024-09-24 PROCEDURE — 90480 ADMN SARSCOV2 VAC 1/ONLY CMP: CPT | Mod: GC,,, | Performed by: FAMILY MEDICINE

## 2024-09-24 PROCEDURE — 1159F MED LIST DOCD IN RCRD: CPT | Mod: CPTII,,, | Performed by: FAMILY MEDICINE

## 2024-09-24 PROCEDURE — 99214 OFFICE O/P EST MOD 30 MIN: CPT | Mod: 25,GC,, | Performed by: FAMILY MEDICINE

## 2024-09-24 PROCEDURE — 3077F SYST BP >= 140 MM HG: CPT | Mod: CPTII,,, | Performed by: FAMILY MEDICINE

## 2024-09-24 PROCEDURE — 91322 SARSCOV2 VAC 50 MCG/0.5ML IM: CPT | Mod: GC,,, | Performed by: FAMILY MEDICINE

## 2024-09-24 PROCEDURE — 3008F BODY MASS INDEX DOCD: CPT | Mod: CPTII,,, | Performed by: FAMILY MEDICINE

## 2024-09-24 PROCEDURE — 85025 COMPLETE CBC W/AUTO DIFF WBC: CPT | Mod: ,,, | Performed by: CLINICAL MEDICAL LABORATORY

## 2024-09-24 RX ORDER — LOSARTAN POTASSIUM 50 MG/1
50 TABLET ORAL 2 TIMES DAILY
Start: 2024-09-24 | End: 2025-03-23

## 2024-09-24 RX ORDER — HYDROCHLOROTHIAZIDE 25 MG/1
25 TABLET ORAL 2 TIMES DAILY
Qty: 180 TABLET | Refills: 1 | Status: SHIPPED | OUTPATIENT
Start: 2024-09-24 | End: 2025-03-23

## 2024-09-24 RX ORDER — LOSARTAN POTASSIUM 50 MG/1
100 TABLET ORAL DAILY
Start: 2024-09-24 | End: 2024-09-24 | Stop reason: SDUPTHER

## 2024-09-24 RX ORDER — AMLODIPINE BESYLATE 10 MG/1
10 TABLET ORAL DAILY
Qty: 90 TABLET | Refills: 1 | Status: SHIPPED | OUTPATIENT
Start: 2024-09-24 | End: 2025-03-23

## 2024-09-24 RX ORDER — ATORVASTATIN CALCIUM 40 MG/1
40 TABLET, FILM COATED ORAL DAILY
Qty: 90 TABLET | Refills: 1 | Status: SHIPPED | OUTPATIENT
Start: 2024-09-24 | End: 2025-03-23

## 2024-09-24 NOTE — ASSESSMENT & PLAN NOTE
Presenting with history of hypertension on antihypertensive therapy. Denies headaches, blurry vision, chest pain, sob, n/v, or abdominal pain. Home bp readings in the 170-180/80's. Today bp 180/81, on repeat 160/80. Reports has been out of losartan. No AV nicking or flame hemorrhages on fundoscopic exam, normal cardiac exam, non-displaced PMI, no JVD or dependent edema. Previously increased losartan to 50 mg po bid. Just finished last bottle. Will manage as follows.  - Norvasc 10 mg daily  - Hydrodiuril 25 mg po bid  - Cozaar 50 mg po bid  - Refills on all medications  - Urinalysis today  - Follow up in 3 months

## 2024-09-24 NOTE — PROGRESS NOTES
Subjective:       Patient ID: Horacio Coello is a 59 y.o. male.    Chief Complaint: No chief complaint on file.    Patient is a 60 yo male with a past medical history of essential hypertension,  mixed hyperlipidemia, JORGE and nicotine dependence  here today for a 3 month follow up and medication refills. Since his last visit in our clinic he has been doing well and has no complaints at this time. Blood pressure last visit was 118/60. Current antihypertensive therapy includes Amlodipine 10 mg daily, HCTZ 25 mg BID and Losartan 50 mg daily. Blood pressure log shows home systolic blood pressure ranging from 170-180/80s.  He denies headache, fever, chills, chest pain, shortness of breath, palpitations, dizziness, nausea, vomiting, diarrhea, constipation and abdominal pain. He has a history of JORGE on CPAP but not very compliant with therapy. ASCVD score 19.9% on a high intensity statin. Last, lipid panel was normal. Has a 20 ppk-year history of smoke. Prior conversations about smoking cessations show patient is not ready to quit.           Current Outpatient Medications:     amLODIPine (NORVASC) 10 MG tablet, Take 1 tablet (10 mg total) by mouth once daily., Disp: 90 tablet, Rfl: 1    atorvastatin (LIPITOR) 40 MG tablet, Take 1 tablet (40 mg total) by mouth once daily., Disp: 90 tablet, Rfl: 1    hydroCHLOROthiazide (HYDRODIURIL) 25 MG tablet, Take 1 tablet (25 mg total) by mouth 2 (two) times daily., Disp: 180 tablet, Rfl: 1    latanoprost 0.005 % ophthalmic solution, Place 1 drop into both eyes every evening., Disp: , Rfl:     losartan (COZAAR) 50 MG tablet, Take 2 tablets (100 mg total) by mouth once daily., Disp: , Rfl:     Review of patient's allergies indicates:  No Known Allergies    Past Medical History:   Diagnosis Date    Hepatitis C infection 4- 6 years ago    Hypertension     Obesity     Unspecified cirrhosis of liver        No past surgical history on file.    Family History   Problem Relation Name Age of  Onset    Diabetes Mother      Hypertension Father          Also Gout       Social History     Tobacco Use    Smoking status: Every Day     Current packs/day: 1.00     Average packs/day: 1 pack/day for 20.0 years (20.0 ttl pk-yrs)     Types: Cigarettes    Smokeless tobacco: Never   Substance Use Topics    Alcohol use: Not Currently    Drug use: Never       Review of Systems   Constitutional:  Negative for fatigue.   HENT: Negative.     Eyes: Negative.    Respiratory:  Negative for shortness of breath.    Cardiovascular:  Negative for chest pain, palpitations and leg swelling.   Gastrointestinal:  Negative for abdominal distention, nausea and vomiting.   Integumentary:  Negative.   Neurological:  Negative for light-headedness and headaches.           Objective:      There were no vitals filed for this visit.  Physical Exam  Vitals reviewed.   Constitutional:       Appearance: Normal appearance.   Eyes:      Conjunctiva/sclera: Conjunctivae normal.      Funduscopic exam:     Right eye: No hemorrhage or AV nicking.         Left eye: No hemorrhage or AV nicking.   Cardiovascular:      Rate and Rhythm: Normal rate and regular rhythm.      Pulses: Normal pulses.      Heart sounds: Normal heart sounds. No murmur heard.     No friction rub. No gallop.   Pulmonary:      Effort: Pulmonary effort is normal.      Breath sounds: Normal breath sounds.   Skin:     Capillary Refill: Capillary refill takes less than 2 seconds.   Neurological:      Mental Status: He is alert.           Lab Results   Component Value Date    WBC 9.65 12/05/2022    HGB 16.2 12/05/2022    HCT 48.6 12/05/2022     12/05/2022    CHOL 145 12/05/2022    TRIG 113 12/05/2022    HDL 35 (L) 12/05/2022    ALT 26 02/01/2024    AST 18 02/01/2024     02/01/2024    K 3.7 02/01/2024     02/01/2024    CREATININE 1.08 02/01/2024    BUN 13 02/01/2024    CO2 32 02/01/2024    TSH 1.530 02/01/2024    HGBA1C 5.8 02/01/2024      Assessment:       1. Primary  hypertension    2. Mixed hyperlipidemia    3. Current smoker    4. Preventive measure    5. Essential hypertension    6. Hypertension, unspecified type    7. Health care maintenance    8. Influenza vaccine needed    9. COVID-19 vaccine administered        Plan:         Problem List Items Addressed This Visit          Cardiac/Vascular    Primary hypertension - Primary (Chronic)     Presenting with history of hypertension on antihypertensive therapy. Denies headaches, blurry vision, chest pain, sob, n/v, or abdominal pain. Home bp readings in the 170-180/80's. Today bp 180/81, on repeat 160/80. Reports has been out of losartan. No AV nicking or flame hemorrhages on fundoscopic exam, normal cardiac exam, non-displaced PMI, no JVD or dependent edema. Previously increased losartan to 50 mg po bid. Just finished last bottle. Will manage as follows.  - Norvasc 10 mg daily  - Hydrodiuril 25 mg po bid  - Cozaar 50 mg po bid  - Refills on all medications  - Urinalysis today  - Follow up in 3 months         Mixed Hyperlipidemia     Denies any chest pain, sob, or claudication. On exam, normal cardiovascular exam. Last lipid panel, 12/5/22, significant for low HDL. History of elevated triglycerides and cholesterol. ASCVD score 19.9%, on moderate intensity statin. Discussed dash diet and incorporation of more exercise each week. Recommended 35-45 mins three time per week. Will manage as follows.  - Repeat lipid panel today  - Lipitor 40 mg po daily         Relevant Medications    atorvastatin (LIPITOR) 40 MG tablet    Other Relevant Orders    Lipid Panel    CBC Auto Differential    Hypertension    Relevant Medications    losartan (COZAAR) 50 MG tablet       Other    Current smoker     Has a 32.4 ppk-yr history of smoking. Discussed smoking cessation with patient. Reports has cut back to half a ppk. Discussed setting a quit date. Not ready to quit. Patient encouraged to continue to making an effort. Low dose CT, 6/12/24, negative.  Will continue to educate and monitor. Annual screening with [facility] in 9 months.          Preventive measure     Patient declined shingrix and RSV vaccine. Will administer the follow today.   - Influenza vaccine  - Covid-19 vaccine          Other Visit Diagnoses       Essential hypertension        Relevant Medications    hydroCHLOROthiazide (HYDRODIURIL) 25 MG tablet    amLODIPine (NORVASC) 10 MG tablet    Other Relevant Orders    Basic Metabolic Panel    CBC Auto Differential    Urinalysis    Health care maintenance        Relevant Orders    PSA, Screening    Urinalysis    Influenza vaccine needed        Relevant Medications    influenza (Flulaval, Fluzone, Fluarix) 45 mcg/0.5 mL IM vaccine (> or = 6 mo) 0.5 mL (Completed)    COVID-19 vaccine administered        Relevant Medications    COVID-19 (Moderna) 50 mcg/0.5 mL IM vaccine (>/= 13 yo) 0.5 mL (Completed)              Follow up in about 3 months (around 12/24/2024).    Shiela Lopez MD

## 2024-09-24 NOTE — ASSESSMENT & PLAN NOTE
Has a 32.4 ppk-yr history of smoking. Discussed smoking cessation with patient. Reports has cut back to half a ppk. Discussed setting a quit date. Not ready to quit. Patient encouraged to continue to making an effort. Low dose CT, 6/12/24, negative. Will continue to educate and monitor. Annual screening with [facility] in 9 months.

## 2024-09-24 NOTE — ASSESSMENT & PLAN NOTE
Patient declined shingrix and RSV vaccine. Will administer vaccines and order following test today.   - Influenza vaccine  - Covid-19 vaccine  - PSA

## 2024-09-24 NOTE — ASSESSMENT & PLAN NOTE
Denies any chest pain, sob, or claudication. On exam, normal cardiovascular exam. Last lipid panel, 12/5/22, significant for low HDL. History of elevated triglycerides and cholesterol. ASCVD score 19.9%, on moderate intensity statin. Discussed dash diet and incorporation of more exercise each week. Recommended 35-45 mins three time per week. Will manage as follows.  - Repeat lipid panel today  - Lipitor 40 mg po daily

## 2024-09-25 NOTE — PROGRESS NOTES
I called and spoke to the patient regarding his labs which showed hypokalemia. Denies any symptoms including arrhythmias, seizure like activity, nausea, vomiting, myalgia, weakness, or paralysis. Possible cause hydrochlorothiazide. Patient instructed to  an over the count potassium supplement and start taking it today. Then follow up in clinic for repeat BMP. Order placed. Will inform clinic of labs only visit then follow up with patient after repeat lab results.

## 2024-12-17 ENCOUNTER — OFFICE VISIT (OUTPATIENT)
Dept: FAMILY MEDICINE | Facility: CLINIC | Age: 59
End: 2024-12-17
Payer: COMMERCIAL

## 2024-12-17 VITALS
RESPIRATION RATE: 18 BRPM | HEART RATE: 117 BPM | BODY MASS INDEX: 32.68 KG/M2 | WEIGHT: 215.63 LBS | TEMPERATURE: 98 F | OXYGEN SATURATION: 99 % | DIASTOLIC BLOOD PRESSURE: 97 MMHG | SYSTOLIC BLOOD PRESSURE: 164 MMHG | HEIGHT: 68 IN

## 2024-12-17 DIAGNOSIS — I10 PRIMARY HYPERTENSION: Chronic | ICD-10-CM

## 2024-12-17 DIAGNOSIS — I10 ESSENTIAL HYPERTENSION: ICD-10-CM

## 2024-12-17 DIAGNOSIS — R00.0 TACHYCARDIA: Primary | ICD-10-CM

## 2024-12-17 DIAGNOSIS — I10 HYPERTENSION, UNSPECIFIED TYPE: ICD-10-CM

## 2024-12-17 DIAGNOSIS — E78.2 MIXED HYPERLIPIDEMIA: Chronic | ICD-10-CM

## 2024-12-17 RX ORDER — LOSARTAN POTASSIUM 50 MG/1
50 TABLET ORAL 2 TIMES DAILY
Qty: 90 TABLET | Refills: 0 | Status: SHIPPED | OUTPATIENT
Start: 2024-12-17 | End: 2025-03-17

## 2024-12-17 RX ORDER — METOPROLOL SUCCINATE 25 MG/1
25 TABLET, EXTENDED RELEASE ORAL DAILY
Qty: 90 TABLET | Refills: 0 | Status: SHIPPED | OUTPATIENT
Start: 2024-12-17 | End: 2025-03-17

## 2024-12-17 RX ORDER — LOSARTAN POTASSIUM 50 MG/1
50 TABLET ORAL 2 TIMES DAILY
Start: 2024-12-17 | End: 2024-12-17

## 2024-12-17 RX ORDER — ATORVASTATIN CALCIUM 40 MG/1
40 TABLET, FILM COATED ORAL DAILY
Qty: 90 TABLET | Refills: 1 | Status: SHIPPED | OUTPATIENT
Start: 2024-12-17 | End: 2025-06-15

## 2024-12-17 RX ORDER — HYDROCHLOROTHIAZIDE 25 MG/1
25 TABLET ORAL 2 TIMES DAILY
Qty: 180 TABLET | Refills: 1 | Status: SHIPPED | OUTPATIENT
Start: 2024-12-17 | End: 2025-06-15

## 2024-12-17 RX ORDER — AMLODIPINE BESYLATE 10 MG/1
10 TABLET ORAL DAILY
Qty: 90 EACH | Refills: 1 | Status: SHIPPED | OUTPATIENT
Start: 2024-12-17 | End: 2025-06-15

## 2024-12-18 ENCOUNTER — TELEPHONE (OUTPATIENT)
Dept: FAMILY MEDICINE | Facility: CLINIC | Age: 59
End: 2024-12-18

## 2024-12-18 NOTE — ASSESSMENT & PLAN NOTE
Presenting with complaints of elevated blood pressures. Last visit continued on current regimen of CCB, HCTZ, and ARB. Patient reports that he was unable to  the Losartan due to medication issue. Was told it was too early to  the prescription and did not receive notification later to pick it up. Denies any chest pain, sob, blurry vision, or palpitation. He has been experiencing some occasional lightheadedness. On exam, tachycardic and diaphoretic. Normal cardiac exam. Continues to have poorly controlled bp. Will manage as follows.   - EKG for tachycardia and lightheadedness; concerning for SVT and L axis deviation  - Referral to cardiology for evaluation  - Refill antihypertensive medication     - Losartan 50 mg po daily     - Norvasc 10 mg po daily     - Hydrochlorothiazide 25 mg po daily  - Start Metoprolol succinate 25 mg po daily  - Called to confirm all prescription are ready for pickup   - Return to clinic in 1 week for bp check

## 2024-12-18 NOTE — TELEPHONE ENCOUNTER
I called to follow up with the patient. He reports that he was able to  his medications and has starting taking them. Patient is scheduled to follow up with me in clinic next week.

## 2024-12-18 NOTE — ASSESSMENT & PLAN NOTE
- Continue atorvastatin 40 mg po daily  - Repeat lipid panel at next visit  - Discussed lifestyle modification for 5-10 mins      - Low fat diet      - Regular daily exercise 30-60 mins most days

## 2024-12-18 NOTE — PROGRESS NOTES
Subjective:       Patient ID: Horacio Coello is a 59 y.o. male.    Chief Complaint: Hypertension (Hypertension follow up. Patient has been out of his losartan for about 5 months now.) and Follow-up    Patient is a 59 year old male with a PMH of primary HTN, mixed HLD, and prediabetes here for follow-up of elevated blood pressure. He is not exercising. Reports he has cut back on his salt intake and consumption of fatty foods. Blood pressure is not well controlled at home. Cardiac symptoms none. Patient denies chest pain, dyspnea, exertional chest pressure/discomfort, lower extremity edema, orthopnea, palpitations, and paroxysmal nocturnal dyspnea.  Cardiovascular risk factors include advanced age (older than 55 for men, 65 for women), dyslipidemia, hypertension, male gender, obesity (BMI >= 30 kg/m2), sedentary lifestyle, and smoking/ tobacco exposure.  ASCVD score 19.9%, on high intensity statin. Prior management with Norvasc, HCTZ, and Losartan. Reports difficulty obtaining ARB and has been out for several months. No history of target organ damage.          Current Outpatient Medications:     amLODIPine (NORVASC) 10 MG tablet, Take 1 tablet (10 mg total) by mouth once daily., Disp: 90 each, Rfl: 1    atorvastatin (LIPITOR) 40 MG tablet, Take 1 tablet (40 mg total) by mouth once daily., Disp: 90 tablet, Rfl: 1    hydroCHLOROthiazide (HYDRODIURIL) 25 MG tablet, Take 1 tablet (25 mg total) by mouth 2 (two) times daily., Disp: 180 tablet, Rfl: 1    latanoprost 0.005 % ophthalmic solution, Place 1 drop into both eyes every evening. (Patient not taking: Reported on 9/24/2024), Disp: , Rfl:     losartan (COZAAR) 50 MG tablet, Take 1 tablet (50 mg total) by mouth 2 (two) times a day., Disp: 90 tablet, Rfl: 0    metoprolol succinate (TOPROL-XL) 25 MG 24 hr tablet, Take 1 tablet (25 mg total) by mouth once daily., Disp: 90 tablet, Rfl: 0    Review of patient's allergies indicates:  No Known Allergies    Past Medical  "History:   Diagnosis Date    Hepatitis C infection 4- 6 years ago    Hypertension     Obesity     Unspecified cirrhosis of liver        No past surgical history on file.    Family History   Problem Relation Name Age of Onset    Diabetes Mother      Hypertension Father          Also Gout       Social History     Tobacco Use    Smoking status: Every Day     Current packs/day: 0.50     Average packs/day: 0.7 packs/day for 45.0 years (32.5 ttl pk-yrs)     Types: Cigarettes     Start date: 2000    Smokeless tobacco: Never   Substance Use Topics    Alcohol use: Not Currently    Drug use: Never       Review of Systems   Constitutional:  Positive for diaphoresis. Negative for fatigue, fever and unexpected weight change.   HENT: Negative.     Eyes:  Negative for visual disturbance.   Respiratory:  Negative for shortness of breath.    Cardiovascular:  Negative for chest pain, palpitations and leg swelling.   Gastrointestinal: Negative.    Genitourinary: Negative.          Objective:      Vitals:    12/17/24 0851   BP: (!) 164/97   BP Location: Left arm   Patient Position: Sitting   Pulse: (!) 117   Resp: 18   Temp: 98 °F (36.7 °C)   TempSrc: Oral   SpO2: 99%   Weight: 97.8 kg (215 lb 9.6 oz)   Height: 5' 8" (1.727 m)     Physical Exam  Vitals and nursing note reviewed.   Constitutional:       Appearance: He is diaphoretic.   Eyes:      Conjunctiva/sclera: Conjunctivae normal.   Cardiovascular:      Rate and Rhythm: Regular rhythm. Tachycardia present.      Pulses: Normal pulses.      Heart sounds: Normal heart sounds. No murmur heard.     No friction rub. No gallop.   Pulmonary:      Effort: Pulmonary effort is normal.      Breath sounds: Normal breath sounds.   Skin:     Capillary Refill: Capillary refill takes less than 2 seconds.   Neurological:      Mental Status: He is alert.       Lab Results   Component Value Date    WBC 9.77 09/24/2024    HGB 15.6 09/24/2024    HCT 47.2 09/24/2024     09/24/2024    " CHOL 117 09/24/2024    TRIG 181 (H) 09/24/2024    HDL 31 (L) 09/24/2024    ALT 26 02/01/2024    AST 18 02/01/2024     09/27/2024    K 2.8 (L) 09/27/2024     09/27/2024    CREATININE 1.17 09/27/2024    BUN 14 09/27/2024    CO2 33 (H) 09/27/2024    TSH 1.530 02/01/2024    PSA 1.350 09/24/2024    HGBA1C 5.8 02/01/2024      Assessment:       1. Tachycardia    2. Essential hypertension    3. Mixed hyperlipidemia    4. Hypertension, unspecified type    5. Primary hypertension        Plan:         Problem List Items Addressed This Visit          Cardiac/Vascular    Primary hypertension (Chronic)     Presenting with complaints of elevated blood pressures. Last visit continued on current regimen of CCB, HCTZ, and ARB. Patient reports that he was unable to  the Losartan due to medication issue. Was told it was too early to  the prescription and did not receive notification later to pick it up. Denies any chest pain, sob, blurry vision, or palpitation. He has been experiencing some occasional lightheadedness. On exam, tachycardic and diaphoretic. Normal cardiac exam. Continues to have poorly controlled bp. Will manage as follows.   - EKG for tachycardia and lightheadedness; concerning for SVT and L axis deviation  - Referral to cardiology for evaluation  - Refill antihypertensive medication     - Losartan 50 mg po daily     - Norvasc 10 mg po daily     - Hydrochlorothiazide 25 mg po daily  - Start Metoprolol succinate 25 mg po daily  - Called to confirm all prescription are ready for pickup   - Return to clinic in 1 week for bp check          Mixed Hyperlipidemia     - Continue atorvastatin 40 mg po daily  - Repeat lipid panel at next visit  - Discussed lifestyle modification for 5-10 mins      - Low fat diet      - Regular daily exercise 30-60 mins most days          Relevant Medications    atorvastatin (LIPITOR) 40 MG tablet    Hypertension    Relevant Medications    losartan (COZAAR) 50 MG tablet     metoprolol succinate (TOPROL-XL) 25 MG 24 hr tablet    Other Relevant Orders    Ambulatory referral/consult to Cardiology     Other Visit Diagnoses       Tachycardia    -  Primary    Relevant Medications    metoprolol succinate (TOPROL-XL) 25 MG 24 hr tablet    Other Relevant Orders    EKG 12-lead    Ambulatory referral/consult to Cardiology    Essential hypertension        Relevant Medications    amLODIPine (NORVASC) 10 MG tablet    hydroCHLOROthiazide (HYDRODIURIL) 25 MG tablet    Other Relevant Orders    EKG 12-lead              Follow up in about 1 week (around 12/24/2024).    Shiela Lopez MD

## 2024-12-24 ENCOUNTER — OFFICE VISIT (OUTPATIENT)
Dept: FAMILY MEDICINE | Facility: CLINIC | Age: 59
End: 2024-12-24
Payer: COMMERCIAL

## 2024-12-24 VITALS
SYSTOLIC BLOOD PRESSURE: 146 MMHG | WEIGHT: 220 LBS | OXYGEN SATURATION: 99 % | HEIGHT: 68 IN | HEART RATE: 91 BPM | RESPIRATION RATE: 18 BRPM | TEMPERATURE: 98 F | DIASTOLIC BLOOD PRESSURE: 86 MMHG | BODY MASS INDEX: 33.34 KG/M2

## 2024-12-24 DIAGNOSIS — I10 HYPERTENSION, UNSPECIFIED TYPE: ICD-10-CM

## 2024-12-24 DIAGNOSIS — I10 PRIMARY HYPERTENSION: Chronic | ICD-10-CM

## 2024-12-24 DIAGNOSIS — I47.10 SVT (SUPRAVENTRICULAR TACHYCARDIA): ICD-10-CM

## 2024-12-24 DIAGNOSIS — I47.10 SUPRAVENTRICULAR TACHYCARDIA: Primary | ICD-10-CM

## 2024-12-24 LAB
ANION GAP SERPL CALCULATED.3IONS-SCNC: 13 MMOL/L (ref 7–16)
BUN SERPL-MCNC: 13 MG/DL (ref 8–26)
BUN/CREAT SERPL: 13 (ref 6–20)
CALCIUM SERPL-MCNC: 9.3 MG/DL (ref 8.8–10)
CHLORIDE SERPL-SCNC: 98 MMOL/L (ref 98–107)
CO2 SERPL-SCNC: 30 MMOL/L (ref 23–31)
CREAT SERPL-MCNC: 1.01 MG/DL (ref 0.72–1.25)
EGFR (NO RACE VARIABLE) (RUSH/TITUS): 85 ML/MIN/1.73M2
GLUCOSE SERPL-MCNC: 88 MG/DL (ref 82–115)
MAGNESIUM SERPL-MCNC: 2.3 MG/DL (ref 1.6–2.6)
POTASSIUM SERPL-SCNC: 3.3 MMOL/L (ref 3.5–5.1)
SODIUM SERPL-SCNC: 138 MMOL/L (ref 136–145)

## 2024-12-24 PROCEDURE — 3077F SYST BP >= 140 MM HG: CPT | Mod: CPTII,,, | Performed by: FAMILY MEDICINE

## 2024-12-24 PROCEDURE — 99213 OFFICE O/P EST LOW 20 MIN: CPT | Mod: GC,,, | Performed by: FAMILY MEDICINE

## 2024-12-24 PROCEDURE — 80048 BASIC METABOLIC PNL TOTAL CA: CPT | Mod: ,,, | Performed by: CLINICAL MEDICAL LABORATORY

## 2024-12-24 PROCEDURE — 3079F DIAST BP 80-89 MM HG: CPT | Mod: CPTII,,, | Performed by: FAMILY MEDICINE

## 2024-12-24 PROCEDURE — 1159F MED LIST DOCD IN RCRD: CPT | Mod: CPTII,,, | Performed by: FAMILY MEDICINE

## 2024-12-24 PROCEDURE — 3008F BODY MASS INDEX DOCD: CPT | Mod: CPTII,,, | Performed by: FAMILY MEDICINE

## 2024-12-24 PROCEDURE — 3044F HG A1C LEVEL LT 7.0%: CPT | Mod: CPTII,,, | Performed by: FAMILY MEDICINE

## 2024-12-24 PROCEDURE — 83735 ASSAY OF MAGNESIUM: CPT | Mod: ,,, | Performed by: CLINICAL MEDICAL LABORATORY

## 2024-12-24 PROCEDURE — 4010F ACE/ARB THERAPY RXD/TAKEN: CPT | Mod: CPTII,,, | Performed by: FAMILY MEDICINE

## 2024-12-24 RX ORDER — LOSARTAN POTASSIUM 50 MG/1
50 TABLET ORAL 2 TIMES DAILY
Qty: 90 TABLET | Refills: 0 | Status: SHIPPED | OUTPATIENT
Start: 2024-12-24 | End: 2025-03-24

## 2024-12-24 NOTE — ASSESSMENT & PLAN NOTE
Here follow-up on blood pressure. Home bp reading have been in the 140's/80's. In office reading is 146/86 down from 182/81. Currently taking Norvasc, HCTZ, and Losartan. Metoprolol was added last visit mainly for SVT. Patient did not start taking due to concerns about low bp. Primary hypertension improving but not at goal. Will manage as follows.   - Norvasc 10 mg po daily  - HCTZ 25 mg po daily  - Losartan 50 mg po daily  - Metoprolol 25 mg po daily  - BP log for next 2 weeks  - Follow up in 1 week; by phone or in person

## 2024-12-24 NOTE — PROGRESS NOTES
Subjective:       Patient ID: Horacio Coello is a 59 y.o. male.    Chief Complaint: Follow-up (X1 week follow up )    Patient is 59 year old male with a PMH of essential hypertension, SVT, mixed hyperlipidemia, prediabetes, and JORGE here for follow-up on his blood pressure. Last visit he was found to be tachycardic. EKG done for dizziness and tachycardia showed SVT with L. axis deviation. See media for EKG. Patient started on metoprolol and referred to cardiology. We also restarted his losartan 50 mg po daily. He has been taking the ARB, but did not start the BB due to concerns about low blood pressure. Home blood pressure readings have trended down from the 190s/100's to 140s/80's. Denies any more dizziness, palpitations, chest pain, or sob. No headaches or blurry vision. Has an appointment with cardiology on January 23, 2025. Of note, reports previously being told he has an arrhythmia.           Current Outpatient Medications:     amLODIPine (NORVASC) 10 MG tablet, Take 1 tablet (10 mg total) by mouth once daily., Disp: 90 each, Rfl: 1    atorvastatin (LIPITOR) 40 MG tablet, Take 1 tablet (40 mg total) by mouth once daily., Disp: 90 tablet, Rfl: 1    hydroCHLOROthiazide (HYDRODIURIL) 25 MG tablet, Take 1 tablet (25 mg total) by mouth 2 (two) times daily., Disp: 180 tablet, Rfl: 1    latanoprost 0.005 % ophthalmic solution, Place 1 drop into both eyes every evening., Disp: , Rfl:     losartan (COZAAR) 50 MG tablet, Take 1 tablet (50 mg total) by mouth 2 (two) times a day., Disp: 90 tablet, Rfl: 0    metoprolol succinate (TOPROL-XL) 25 MG 24 hr tablet, Take 1 tablet (25 mg total) by mouth once daily. (Patient not taking: Reported on 12/24/2024), Disp: 90 tablet, Rfl: 0    Review of patient's allergies indicates:  No Known Allergies    Past Medical History:   Diagnosis Date    Hepatitis C infection 4- 6 years ago    Hypertension     Obesity     Unspecified cirrhosis of liver        No past surgical history on  "file.    Family History   Problem Relation Name Age of Onset    Diabetes Mother      Hypertension Father          Also Gout       Social History     Tobacco Use    Smoking status: Every Day     Current packs/day: 0.50     Average packs/day: 0.7 packs/day for 45.0 years (32.5 ttl pk-yrs)     Types: Cigarettes     Start date: 2000    Smokeless tobacco: Never   Substance Use Topics    Alcohol use: Not Currently    Drug use: Never       Review of Systems   Constitutional:  Negative for diaphoresis and fatigue.   HENT: Negative.     Eyes:  Negative for visual disturbance.   Respiratory:  Negative for shortness of breath.    Cardiovascular:  Negative for chest pain and palpitations.   Gastrointestinal: Negative.    Neurological:  Negative for dizziness, syncope, light-headedness and headaches.           Objective:      Vitals:    12/24/24 0814   BP: (!) 146/86   BP Location: Left arm   Patient Position: Sitting   Pulse: 91   Resp: 18   Temp: 98.3 °F (36.8 °C)   TempSrc: Oral   SpO2: 99%   Weight: 99.8 kg (220 lb)   Height: 5' 8" (1.727 m)     Physical Exam  Vitals and nursing note reviewed.   Constitutional:       General: He is not in acute distress.     Appearance: Normal appearance. He is not diaphoretic.   Cardiovascular:      Rate and Rhythm: Normal rate and regular rhythm.      Pulses: Normal pulses.      Heart sounds: Normal heart sounds. No murmur heard.     No friction rub. No gallop.   Pulmonary:      Effort: Pulmonary effort is normal.   Skin:     General: Skin is warm and dry.      Capillary Refill: Capillary refill takes less than 2 seconds.   Neurological:      Mental Status: He is alert.           Lab Results   Component Value Date    WBC 9.77 09/24/2024    HGB 15.6 09/24/2024    HCT 47.2 09/24/2024     09/24/2024    CHOL 117 09/24/2024    TRIG 181 (H) 09/24/2024    HDL 31 (L) 09/24/2024    ALT 26 02/01/2024    AST 18 02/01/2024     09/27/2024    K 2.8 (L) 09/27/2024     09/27/2024    " CREATININE 1.17 09/27/2024    BUN 14 09/27/2024    CO2 33 (H) 09/27/2024    TSH 1.530 02/01/2024    PSA 1.350 09/24/2024    HGBA1C 5.8 02/01/2024      Assessment:       1. Supraventricular tachycardia    2. Hypertension, unspecified type        Plan:         Problem List Items Addressed This Visit          Cardiac/Vascular    Primary hypertension (Chronic)     Here follow-up on blood pressure. Home bp reading have been in the 140's/80's. In office reading is 146/86 down from 182/81. Currently taking Norvasc, HCTZ, and Losartan. Metoprolol was added last visit mainly for SVT. Patient did not start taking due to concerns about low bp. Primary hypertension improving but not at goal. Will manage as follows.   - Norvasc 10 mg po daily  - HCTZ 25 mg po daily  - Losartan 50 mg po daily  - Metoprolol 25 mg po daily  - BP log for next 2 weeks  - Follow up in 1 week; by phone or in person             SVT (supraventricular tachycardia)     Prior symptoms of dizziness, diaphoresis, and tachycardia. EKG show SVT with left axis deviation, see media. Started on a low dose Metoprolol along with antihypertensive regimen of CCB, ARB, thiazide. Today denies any symptoms. Patient has been scheduled with cardiology on January 23, 2025. Will defer to cardiology on management of arhythmia. History of hypokalemia currently on supplementation. In the meantime, will manage as follows.   - Check lytes, including Mg and K  - Instructed to keep appointment with cardiology  - Start BB  - Provided education and material on SVT         Hypertension    Relevant Medications    losartan (COZAAR) 50 MG tablet     Other Visit Diagnoses       Supraventricular tachycardia    -  Primary    Relevant Orders    Magnesium    Basic Metabolic Panel              Follow up in about 3 months (around 3/24/2025).    Shiela Lopez MD

## 2024-12-24 NOTE — ASSESSMENT & PLAN NOTE
Prior symptoms of dizziness, diaphoresis, and tachycardia. EKG show SVT with left axis deviation, see media. Started on a low dose Metoprolol along with antihypertensive regimen of CCB, ARB, thiazide. Today denies any symptoms. Patient has been scheduled with cardiology on January 23, 2025. Will defer to cardiology on management of arhythmia. History of hypokalemia currently on supplementation. In the meantime, will manage as follows.   - Check lytes, including Mg and K  - Instructed to keep appointment with cardiology  - Start BB  - Provided education and material on SVT

## 2025-01-22 ENCOUNTER — OFFICE VISIT (OUTPATIENT)
Dept: CARDIOLOGY | Facility: CLINIC | Age: 60
End: 2025-01-22
Payer: COMMERCIAL

## 2025-01-22 VITALS
WEIGHT: 217 LBS | HEIGHT: 68 IN | BODY MASS INDEX: 32.89 KG/M2 | SYSTOLIC BLOOD PRESSURE: 162 MMHG | DIASTOLIC BLOOD PRESSURE: 96 MMHG | HEART RATE: 109 BPM | OXYGEN SATURATION: 100 %

## 2025-01-22 DIAGNOSIS — R00.2 PALPITATIONS: Primary | ICD-10-CM

## 2025-01-22 DIAGNOSIS — I10 HYPERTENSION, UNSPECIFIED TYPE: ICD-10-CM

## 2025-01-22 DIAGNOSIS — F17.200 CURRENT SMOKER: ICD-10-CM

## 2025-01-22 DIAGNOSIS — R00.0 TACHYCARDIA: ICD-10-CM

## 2025-01-22 DIAGNOSIS — E66.9 OBESITY (BMI 30-39.9): Chronic | ICD-10-CM

## 2025-01-22 DIAGNOSIS — G47.33 OSA ON CPAP: ICD-10-CM

## 2025-01-22 DIAGNOSIS — R94.31 NONSPECIFIC ABNORMAL ELECTROCARDIOGRAM (ECG) (EKG): ICD-10-CM

## 2025-01-22 PROCEDURE — 1159F MED LIST DOCD IN RCRD: CPT | Mod: CPTII,,, | Performed by: INTERNAL MEDICINE

## 2025-01-22 PROCEDURE — 99214 OFFICE O/P EST MOD 30 MIN: CPT | Mod: PBBFAC | Performed by: INTERNAL MEDICINE

## 2025-01-22 PROCEDURE — 3077F SYST BP >= 140 MM HG: CPT | Mod: CPTII,,, | Performed by: INTERNAL MEDICINE

## 2025-01-22 PROCEDURE — 93005 ELECTROCARDIOGRAM TRACING: CPT | Mod: PBBFAC | Performed by: INTERNAL MEDICINE

## 2025-01-22 PROCEDURE — 3008F BODY MASS INDEX DOCD: CPT | Mod: CPTII,,, | Performed by: INTERNAL MEDICINE

## 2025-01-22 PROCEDURE — 99999 PR PBB SHADOW E&M-EST. PATIENT-LVL IV: CPT | Mod: PBBFAC,,, | Performed by: INTERNAL MEDICINE

## 2025-01-22 PROCEDURE — 99205 OFFICE O/P NEW HI 60 MIN: CPT | Mod: S$PBB,,, | Performed by: INTERNAL MEDICINE

## 2025-01-22 PROCEDURE — 3080F DIAST BP >= 90 MM HG: CPT | Mod: CPTII,,, | Performed by: INTERNAL MEDICINE

## 2025-01-22 PROCEDURE — 93010 ELECTROCARDIOGRAM REPORT: CPT | Mod: S$PBB,,, | Performed by: INTERNAL MEDICINE

## 2025-01-22 NOTE — PROGRESS NOTES
PCP: Shiela Lopez MD    Referring Provider:     Subjective:   Horacio Coello is a 59 y.o. male with hx of hypertension who presents for evaluation of abnormal EKG.   He underwent routine physical, found to have an abnormal EKG, was referred for evaluation. He is very active as a , without provocation of chest pain, pressure or shortness of breath.  He continues to smoke against medical advice, and is not compliant with CPAP.   He denies previous cardiac evaluation.  Pt has SVT listed in medical record, however denies previous evaluation for SVT.             Fhx:  Family History   Problem Relation Name Age of Onset    Diabetes Mother      Hypertension Father          Also Gout       History reviewed. No pertinent surgical history.      EKG - NSR, RBBB    ECHO - No results found for this or any previous visit.       CATH - No results found for this or any previous visit.       Stress - No results found for this or any previous visit.       Lab Results   Component Value Date     12/24/2024    K 3.3 (L) 12/24/2024    CL 98 12/24/2024    CO2 30 12/24/2024    BUN 13 12/24/2024    CREATININE 1.01 12/24/2024    CALCIUM 9.3 12/24/2024    ANIONGAP 13 12/24/2024    ESTGFRAFRICA 85 12/07/2021    EGFRNONAA 93 07/18/2022       Lab Results   Component Value Date    CHOL 117 09/24/2024    CHOL 145 12/05/2022    CHOL 145 11/21/2022     Lab Results   Component Value Date    HDL 31 (L) 09/24/2024    HDL 35 (L) 12/05/2022    HDL 30 (L) 11/21/2022     Lab Results   Component Value Date    LDLCALC 50 09/24/2024    LDLCALC 87 12/05/2022    LDLCALC 73 11/21/2022     Lab Results   Component Value Date    TRIG 181 (H) 09/24/2024    TRIG 113 12/05/2022    TRIG 209 (H) 11/21/2022     Lab Results   Component Value Date    CHOLHDL 3.8 09/24/2024    CHOLHDL 4.1 12/05/2022    CHOLHDL 4.8 11/21/2022       Lab Results   Component Value Date    WBC 9.77 09/24/2024    HGB 15.6 09/24/2024    HCT 47.2 09/24/2024    MCV 92.7  09/24/2024     09/24/2024           Current Outpatient Medications:     amLODIPine (NORVASC) 10 MG tablet, Take 1 tablet (10 mg total) by mouth once daily., Disp: 90 each, Rfl: 1    atorvastatin (LIPITOR) 40 MG tablet, Take 1 tablet (40 mg total) by mouth once daily., Disp: 90 tablet, Rfl: 1    hydroCHLOROthiazide (HYDRODIURIL) 25 MG tablet, Take 1 tablet (25 mg total) by mouth 2 (two) times daily., Disp: 180 tablet, Rfl: 1    latanoprost 0.005 % ophthalmic solution, Place 1 drop into both eyes every evening., Disp: , Rfl:     losartan (COZAAR) 50 MG tablet, Take 1 tablet (50 mg total) by mouth 2 (two) times a day., Disp: 90 tablet, Rfl: 0    metoprolol succinate (TOPROL-XL) 25 MG 24 hr tablet, Take 1 tablet (25 mg total) by mouth once daily., Disp: 90 tablet, Rfl: 0    Review of Systems   Constitutional: Negative for diaphoresis, malaise/fatigue, night sweats and weight gain.   HENT:  Negative for congestion, ear pain, hearing loss, nosebleeds and sore throat.    Eyes:  Negative for blurred vision, double vision, pain, photophobia and visual disturbance.   Cardiovascular:  Negative for chest pain, claudication, dyspnea on exertion, irregular heartbeat, leg swelling, near-syncope, orthopnea, palpitations and syncope.   Respiratory:  Positive for sleep disturbances due to breathing and snoring. Negative for cough, shortness of breath and wheezing.    Endocrine: Negative for cold intolerance, heat intolerance, polydipsia, polyphagia and polyuria.   Hematologic/Lymphatic: Negative for bleeding problem. Does not bruise/bleed easily.   Skin:  Negative for dry skin, flushing, itching, rash and skin cancer.   Musculoskeletal:  Negative for arthritis, back pain, falls, joint pain, muscle cramps, muscle weakness and myalgias.   Gastrointestinal:  Negative for abdominal pain, change in bowel habit, constipation, diarrhea, dysphagia, heartburn, nausea and vomiting.   Genitourinary:  Negative for bladder incontinence,  "dysuria, flank pain, frequency and nocturia.   Neurological:  Negative for dizziness, focal weakness, headaches, light-headedness, loss of balance, numbness, paresthesias and seizures.   Psychiatric/Behavioral:  Negative for depression, memory loss and substance abuse. The patient is not nervous/anxious.    Allergic/Immunologic: Negative for environmental allergies.          Objective:   BP (!) 162/96 (BP Location: Left arm, Patient Position: Sitting)   Pulse 109   Ht 5' 8" (1.727 m)   Wt 98.4 kg (217 lb)   SpO2 100%   BMI 32.99 kg/m²       Physical Exam  Vitals and nursing note reviewed.   Constitutional:       Appearance: Normal appearance. He is obese.   HENT:      Head: Normocephalic and atraumatic.      Right Ear: External ear normal.      Left Ear: External ear normal.   Eyes:      General: No scleral icterus.        Right eye: No discharge.         Left eye: No discharge.      Extraocular Movements: Extraocular movements intact.      Conjunctiva/sclera: Conjunctivae normal.      Pupils: Pupils are equal, round, and reactive to light.   Cardiovascular:      Rate and Rhythm: Normal rate and regular rhythm.      Pulses: Normal pulses.      Heart sounds: Normal heart sounds. No murmur heard.     No friction rub. No gallop.   Pulmonary:      Effort: Pulmonary effort is normal.      Breath sounds: Normal breath sounds. No wheezing, rhonchi or rales.   Chest:      Chest wall: No tenderness.   Abdominal:      General: Abdomen is flat. Bowel sounds are normal. There is no distension.      Palpations: Abdomen is soft.      Tenderness: There is no abdominal tenderness. There is no guarding or rebound.   Musculoskeletal:         General: No swelling or tenderness. Normal range of motion.      Cervical back: Normal range of motion and neck supple.   Skin:     General: Skin is warm and dry.      Findings: No erythema or rash.   Neurological:      General: No focal deficit present.      Mental Status: He is alert and " oriented to person, place, and time.      Cranial Nerves: No cranial nerve deficit.      Motor: No weakness.      Gait: Gait normal.   Psychiatric:         Mood and Affect: Mood normal.         Behavior: Behavior normal.         Thought Content: Thought content normal.         Judgment: Judgment normal.         Assessment:     1. Palpitations  Exercise Stress - EKG    Echo    Pt denies hx of palpitations, SVT      2. Hypertension, unspecified type  Ambulatory referral/consult to Cardiology      3. Tachycardia  Ambulatory referral/consult to Cardiology    EKG 12-lead    EKG 12-lead    Not evident on clinical exam, pt denies heart racing.      4. Obesity (BMI 30-39.9)      discussed lifestyle changes, weight loss goal 15 lbs over next six months.      5. Current smoker      discussed smoking cessation      6. JORGE on CPAP      non-compliant with CPAP, will refer to sleep medicine, has not been evalutate in 2 years, agrees to  make another attempt to use machine.      7. Nonspecific abnormal electrocardiogram (ECG) (EKG)      Abnormal EKG, RBBB, recomend echo and stress imaging to evaluate for ischemic substrate and structural heart disease.            Plan:   Follow up to review results of stress and echo, smoking cessaton, refer to sleep medicine for recommendations of tx for JORGE

## 2025-01-27 ENCOUNTER — TELEPHONE (OUTPATIENT)
Dept: CARDIOLOGY | Facility: CLINIC | Age: 60
End: 2025-01-27
Payer: COMMERCIAL

## 2025-01-27 NOTE — TELEPHONE ENCOUNTER
Spoke with the patient on today to notify him that his echo has been canceled due to his insurance and that we will move forward with the stress test that is scheduled for February 7th.    Patient verbalized understanding.

## 2025-01-31 LAB
OHS QRS DURATION: 96 MS
OHS QTC CALCULATION: 433 MS

## 2025-02-02 DIAGNOSIS — I10 HYPERTENSION, UNSPECIFIED TYPE: ICD-10-CM

## 2025-02-02 RX ORDER — LOSARTAN POTASSIUM 50 MG/1
50 TABLET ORAL 2 TIMES DAILY
Qty: 90 TABLET | Refills: 0 | Status: SHIPPED | OUTPATIENT
Start: 2025-02-02 | End: 2025-05-03

## 2025-02-17 ENCOUNTER — TELEPHONE (OUTPATIENT)
Dept: FAMILY MEDICINE | Facility: CLINIC | Age: 60
End: 2025-02-17
Payer: COMMERCIAL

## 2025-02-18 ENCOUNTER — RESULTS FOLLOW-UP (OUTPATIENT)
Dept: FAMILY MEDICINE | Facility: CLINIC | Age: 60
End: 2025-02-18

## 2025-02-18 NOTE — TELEPHONE ENCOUNTER
I spoke to Mr. Coello about scheduling a follow up appointment. Last seen in December 2024. He would like to schedule a walk in. I recommended scheduling an appointment on Thursday or early next week. He also needs a refill on his Lipitor  and Losartan. I called his pharmacist. No response tonight. Will call again tomorrow. Since the last appointment the patient reports he has been out of work and experienced an exacerbation of his low back pain. Recommended he reschedule an appointment with us in the next week.

## 2025-02-20 ENCOUNTER — OFFICE VISIT (OUTPATIENT)
Dept: FAMILY MEDICINE | Facility: CLINIC | Age: 60
End: 2025-02-20
Payer: COMMERCIAL

## 2025-02-20 VITALS
WEIGHT: 217.38 LBS | HEART RATE: 91 BPM | SYSTOLIC BLOOD PRESSURE: 147 MMHG | TEMPERATURE: 98 F | OXYGEN SATURATION: 100 % | DIASTOLIC BLOOD PRESSURE: 90 MMHG | RESPIRATION RATE: 18 BRPM | HEIGHT: 68 IN | BODY MASS INDEX: 32.94 KG/M2

## 2025-02-20 DIAGNOSIS — E78.2 MIXED HYPERLIPIDEMIA: Chronic | ICD-10-CM

## 2025-02-20 DIAGNOSIS — R00.0 TACHYCARDIA: ICD-10-CM

## 2025-02-20 DIAGNOSIS — I10 ESSENTIAL HYPERTENSION: ICD-10-CM

## 2025-02-20 DIAGNOSIS — M54.41 ACUTE MIDLINE LOW BACK PAIN WITH BILATERAL SCIATICA: Primary | ICD-10-CM

## 2025-02-20 DIAGNOSIS — M54.42 ACUTE MIDLINE LOW BACK PAIN WITH BILATERAL SCIATICA: Primary | ICD-10-CM

## 2025-02-20 DIAGNOSIS — I10 HYPERTENSION, UNSPECIFIED TYPE: ICD-10-CM

## 2025-02-20 RX ORDER — HYDROCHLOROTHIAZIDE 25 MG/1
25 TABLET ORAL 2 TIMES DAILY
Qty: 180 TABLET | Refills: 1 | Status: SHIPPED | OUTPATIENT
Start: 2025-02-20 | End: 2025-08-19

## 2025-02-20 RX ORDER — METOPROLOL SUCCINATE 25 MG/1
25 TABLET, EXTENDED RELEASE ORAL DAILY
Qty: 90 TABLET | Refills: 0 | Status: SHIPPED | OUTPATIENT
Start: 2025-02-20 | End: 2025-05-21

## 2025-02-20 RX ORDER — NAPROXEN 500 MG/1
500 TABLET ORAL 2 TIMES DAILY PRN
Qty: 60 TABLET | Refills: 1 | Status: SHIPPED | OUTPATIENT
Start: 2025-02-20 | End: 2025-04-21

## 2025-02-20 RX ORDER — AMLODIPINE BESYLATE 10 MG/1
10 TABLET ORAL DAILY
Qty: 90 EACH | Refills: 1 | Status: SHIPPED | OUTPATIENT
Start: 2025-02-20 | End: 2025-08-19

## 2025-02-20 RX ORDER — LOSARTAN POTASSIUM 100 MG/1
50 TABLET ORAL DAILY
Qty: 15 TABLET | Refills: 4 | Status: SHIPPED | OUTPATIENT
Start: 2025-02-20 | End: 2025-07-20

## 2025-02-20 RX ORDER — ATORVASTATIN CALCIUM 40 MG/1
40 TABLET, FILM COATED ORAL DAILY
Qty: 90 TABLET | Refills: 1 | Status: SHIPPED | OUTPATIENT
Start: 2025-02-20 | End: 2025-08-19

## 2025-02-20 RX ORDER — DICLOFENAC SODIUM 10 MG/G
2 GEL TOPICAL 4 TIMES DAILY PRN
Qty: 1 EACH | Refills: 2 | Status: SHIPPED | OUTPATIENT
Start: 2025-02-20 | End: 2025-03-22

## 2025-02-21 NOTE — PROGRESS NOTES
Subjective:       Patient ID: Horacio Coello is a 59 y.o. male.    Chief Complaint: Hypertension (Room 7 f/u, medicine refills )    The patient is a 59 year old male presented with back pain, suspected to be related to a prior bulging disc injury.  He reports experiencing back pain with a burning sensation primarily in the hips. The pain was described as tolerable but worsened after prolonged sitting or standing suddenly. He has a history of an old back injury diagnosed as a bulging disc treated by a chiropractor five to six years ago. The patient stated that the current episode began approximately two weeks ago and noted that the pain is similar to the previous episode. The pain was rated as a 6 out of 10 burning that starts in the lower back then radiates down to the hips bilaterally, right greater than left. Denies having headaches, blurry vision, chest pain, shortness of breath, palpitations, or any urinary or stool incontinence. No muscle cramps or numbness and tingling in the feet. Pain improved with otc ibuprofen.        Current Medications[1]    Review of patient's allergies indicates:  No Known Allergies    Past Medical History:   Diagnosis Date    Hepatitis C infection 4- 6 years ago    Hypertension     Obesity     Unspecified cirrhosis of liver        History reviewed. No pertinent surgical history.    Family History   Problem Relation Name Age of Onset    Diabetes Mother      Hypertension Father          Also Gout       Social History[2]    Review of Systems   Constitutional:  Negative for appetite change, fatigue, fever and unexpected weight change.   Eyes:  Negative for pain and visual disturbance.   Respiratory:  Negative for cough and shortness of breath.    Cardiovascular:  Negative for chest pain, palpitations and leg swelling.   Gastrointestinal:  Negative for abdominal pain, constipation, diarrhea, nausea and vomiting.   Genitourinary:  Negative for dysuria, flank pain, frequency and urgency.  "  Musculoskeletal:  Positive for back pain. Negative for arthralgias, gait problem, leg pain, myalgias, neck pain and neck stiffness.   Integumentary:  Negative for rash.   Neurological:  Negative for weakness, light-headedness, numbness and headaches.   Hematological:  Does not bruise/bleed easily.           Objective:      Vitals:    02/20/25 1552   BP: (!) 147/90   BP Location: Left arm   Patient Position: Sitting   Pulse: 91   Resp: 18   Temp: 98.4 °F (36.9 °C)   TempSrc: Oral   SpO2: 100%   Weight: 98.6 kg (217 lb 6.4 oz)   Height: 5' 8" (1.727 m)     Physical Exam  Vitals and nursing note reviewed.   Constitutional:       Appearance: Normal appearance.   Cardiovascular:      Rate and Rhythm: Normal rate and regular rhythm.      Pulses: Normal pulses.      Heart sounds: Normal heart sounds.   Pulmonary:      Effort: Pulmonary effort is normal.      Breath sounds: Normal breath sounds.   Abdominal:      General: Bowel sounds are normal.      Palpations: Abdomen is soft.   Musculoskeletal:      Cervical back: Normal, normal range of motion and neck supple.      Thoracic back: Normal.      Lumbar back: No edema, deformity, spasms, tenderness or bony tenderness. Decreased range of motion. Negative right straight leg raise test and negative left straight leg raise test. No scoliosis.      Right lower leg: No edema.      Left lower leg: No edema.   Skin:     General: Skin is warm and dry.      Capillary Refill: Capillary refill takes less than 2 seconds.   Neurological:      General: No focal deficit present.      Mental Status: He is alert and oriented to person, place, and time.      Sensory: No sensory deficit.      Motor: No weakness.      Coordination: Coordination normal.      Gait: Gait normal.      Deep Tendon Reflexes: Reflexes normal.           Lab Results   Component Value Date    WBC 9.77 09/24/2024    HGB 15.6 09/24/2024    HCT 47.2 09/24/2024     09/24/2024    CHOL 117 09/24/2024    TRIG 181 (H) " 09/24/2024    HDL 31 (L) 09/24/2024    ALT 26 02/01/2024    AST 18 02/01/2024     12/24/2024    K 3.3 (L) 12/24/2024    CL 98 12/24/2024    CREATININE 1.01 12/24/2024    BUN 13 12/24/2024    CO2 30 12/24/2024    TSH 1.530 02/01/2024    PSA 1.350 09/24/2024    HGBA1C 5.8 02/01/2024      Assessment:       1. Acute midline low back pain with bilateral sciatica    2. Hypertension, unspecified type    3. Essential hypertension    4. Mixed hyperlipidemia    5. Tachycardia        Plan:         Problem List Items Addressed This Visit       Hypertension    Relevant Medications    losartan (COZAAR) 100 MG tablet    metoprolol succinate (TOPROL-XL) 25 MG 24 hr tablet    Mixed Hyperlipidemia    Relevant Medications    atorvastatin (LIPITOR) 40 MG tablet    Acute midline low back pain with bilateral sciatica - Primary    The patient presented with symptoms consistent with low back pain, potentially due to a bulging disc or sciatica. The history of a previous bulging disc suggests a possible recurrence or exacerbation of the underlying condition. Other less likely causes include spinal stenosis or arthritis, given the family history and symptomatology. The absence of neurological deficits or significant radicular symptoms suggests the condition may be managed conservatively at this stage.     - Recommended naproxen for pain management  - Ordered a lumbar x-ray to evaluate the bony structures of the spine for misalignment or other abnormalities  - Referral for physical therapy   - Follow-up in three months for a follow-up visit to reassess symptoms and review imaging results           Relevant Medications    naproxen (NAPROSYN) 500 MG tablet    diclofenac sodium (VOLTAREN ARTHRITIS PAIN) 1 % Gel    Other Relevant Orders    X-Ray Lumbar Spine AP And Lateral (Completed)    Ambulatory Referral/Consult to Physical/Occupational Therapy     Other Visit Diagnoses         Essential hypertension        Relevant Medications     amLODIPine (NORVASC) 10 MG tablet    hydroCHLOROthiazide (HYDRODIURIL) 25 MG tablet      Tachycardia        Relevant Medications    metoprolol succinate (TOPROL-XL) 25 MG 24 hr tablet              Follow up in about 3 months (around 5/20/2025).    Shiela Lopez MD     A total of 20 minutes of total time spent on the encounter, which includes face to face time and non-face to face time preparing to see the patient (eg, review of tests), obtaining and/or reviewing separately obtained history, documenting clinical information in the electronic or other health record. Independently interpreting results (not separately reported) and communicating results to the patient/family/caregiver, or Care coordination (not separately reported).             [1]   Current Outpatient Medications:     losartan (COZAAR) 50 MG tablet, Take 1 tablet (50 mg total) by mouth 2 (two) times a day., Disp: 90 tablet, Rfl: 0    amLODIPine (NORVASC) 10 MG tablet, Take 1 tablet (10 mg total) by mouth once daily., Disp: 90 each, Rfl: 1    atorvastatin (LIPITOR) 40 MG tablet, Take 1 tablet (40 mg total) by mouth once daily., Disp: 90 tablet, Rfl: 1    diclofenac sodium (VOLTAREN ARTHRITIS PAIN) 1 % Gel, Apply 2 g topically 4 (four) times daily as needed., Disp: 1 each, Rfl: 2    hydroCHLOROthiazide (HYDRODIURIL) 25 MG tablet, Take 1 tablet (25 mg total) by mouth 2 (two) times daily., Disp: 180 tablet, Rfl: 1    losartan (COZAAR) 100 MG tablet, Take 0.5 tablets (50 mg total) by mouth once daily., Disp: 15 tablet, Rfl: 4    metoprolol succinate (TOPROL-XL) 25 MG 24 hr tablet, Take 1 tablet (25 mg total) by mouth once daily., Disp: 90 tablet, Rfl: 0    naproxen (NAPROSYN) 500 MG tablet, Take 1 tablet (500 mg total) by mouth 2 (two) times daily as needed., Disp: 60 tablet, Rfl: 1  [2]   Social History  Tobacco Use    Smoking status: Every Day     Current packs/day: 0.50     Average packs/day: 0.7 packs/day for 45.1 years (32.6 ttl pk-yrs)     Types:  Cigarettes     Start date: 2000    Smokeless tobacco: Never   Substance Use Topics    Alcohol use: Not Currently    Drug use: Never

## 2025-02-21 NOTE — ASSESSMENT & PLAN NOTE
The patient presented with symptoms consistent with low back pain, potentially due to a bulging disc or sciatica. The history of a previous bulging disc suggests a possible recurrence or exacerbation of the underlying condition. Other less likely causes include spinal stenosis or arthritis, given the family history and symptomatology. The absence of neurological deficits or significant radicular symptoms suggests the condition may be managed conservatively at this stage.     - Recommended naproxen for pain management  - Ordered a lumbar x-ray to evaluate the bony structures of the spine for misalignment or other abnormalities  - Referral for physical therapy   - Follow-up in three months for a follow-up visit to reassess symptoms and review imaging results

## 2025-03-03 ENCOUNTER — CLINICAL SUPPORT (OUTPATIENT)
Dept: REHABILITATION | Facility: HOSPITAL | Age: 60
End: 2025-03-03
Payer: COMMERCIAL

## 2025-03-03 DIAGNOSIS — M54.42 ACUTE MIDLINE LOW BACK PAIN WITH BILATERAL SCIATICA: Primary | ICD-10-CM

## 2025-03-03 DIAGNOSIS — M54.41 ACUTE MIDLINE LOW BACK PAIN WITH BILATERAL SCIATICA: Primary | ICD-10-CM

## 2025-03-03 DIAGNOSIS — R53.1 DECREASED STRENGTH: ICD-10-CM

## 2025-03-03 DIAGNOSIS — R52 PAIN: ICD-10-CM

## 2025-03-03 PROCEDURE — 97161 PT EVAL LOW COMPLEX 20 MIN: CPT

## 2025-03-03 NOTE — PROGRESS NOTES
Outpatient Rehab    Physical Therapy Evaluation    Patient Name: Horacio Coello  MRN: [id number]  YOB: 1965  Encounter Date: 3/3/2025    Therapy Diagnosis:   Encounter Diagnoses   Name Primary?    Acute midline low back pain with bilateral sciatica Yes    Pain     Decreased strength      Physician: Carol Simons DO    Physician Orders: Eval and Treat  Medical Diagnosis: Lumbar Radiuclopathy    Visit # / Visits Authorized:  1 / pending auth   Date of Evaluation:  3/3/2025   Insurance Authorization Period: pending auth  Plan of Care Certification:  3/3/2025 to 5/26/25      Time In: 0716   Time Out: 0755  Total Time: 39   Total Billable Time: 39    Intake Outcome Measure for FOTO Survey    Therapist reviewed FOTO scores for Horacio Coello on 3/3/2025.   FOTO report - see Media section or FOTO account episode details.     Intake Score: 51%         Subjective   History of Present Illness  Horacio is a 59 y.o. male who reports to physical therapy with a chief concern of Intermittent dull ache and bilateral radicular symptoms.     The patient reports a medical diagnosis of Lumbar radiculopathy.    Diagnostic tests related to this condition: X-ray.   X-Ray Details: XR LUMBAR SPINE AP AND LATERAL  CLINICAL HISTORY:  Lumbago with sciatica, left side  TECHNIQUE:  Lumbar spine, AP lateral and cone-down views  COMPARISON:  None.  FINDINGS:  The lumbar vertebra are normally aligned.  There are marginal osteophytes consistent with early degenerative change at L2-3 L3-4 and L4-5.  No subluxation is seen.  There is sclerosis involving the superior aspect of the right SI joint which may represent posterior osteophytosis.  In the left SI joint appears normal.  Impression:  Mild degenerative disc changes are present at multiple levels.    Dominant Hand: Right  History of Present Condition/Illness: Patient initially injured back ~ 4 years ago when carrying a heavy board with another person and the other person  dropped their end of the board. Patient saw a chiropractor at that time and the symptoms resolved. Currently, the patient has similar pain that began ~ 4 weeks ago and had to take time off work until the pain eased. The patient now c/o bilateral posterior thigh burning that worsens with prolonged walking of ~ 10 minutes or less. The low back has an intermittent dull ache that worsens with squatting and forward bending. 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 Patient has been able to sleep through the night. Denies numbness/tingling. Relief with sitting, lying down, heat and massage.     Activities of Daily Living  Social history was obtained from Patient.               Previously independent with activities of daily living? Yes     Currently independent with activities of daily living? Yes              Pain     Patient reports a current pain level of 5/10. Pain at best is reported as 10/10.    Location: low back and bilateral posterior thighs  Clinical Progression (since onset): Stable  Pain Qualities: Aching, Dull, Burning  Pain-Relieving Factors: Lying down, Massage, Heat, Rest  Pain-Aggravating Factors: Bending, Squatting, Walking         Review of Systems  Patient reports: Back Pain  Additional Review of Systems Details: High blood pressure     Employment  Patient reports: Does the patient's condition impact their ability to work?  Employment Status: Employed full-time    - [employer]      Past Medical History/Physical Systems Review:   Horacio Coello  has a past medical history of Hepatitis C infection, Hypertension, Obesity, and Unspecified cirrhosis of liver.    Horacio Coello  has no past surgical history on file.    Horacio has a current medication list which includes the following prescription(s): amlodipine, atorvastatin, diclofenac sodium, hydrochlorothiazide, losartan, losartan, metoprolol succinate, and  naproxen.    Review of patient's allergies indicates:  No Known Allergies     Objective      Lower Extremity Sensation  Right Lumbar/Lower Extremity Sensation  Intact: Light Touch       Left Lumbar/Lower Extremity Sensation  Intact: Light Touch                Right Lower Extremity Reflexes  Patellar, L4: Normal (2+)                   Left Lower Extremity Reflexes  Patellar, L4: Normal (2+)                        Spinal Mobility  Normal: Lumbosacral       Vertebral Palpation  Vertebral Structures Palpated  Lumbar: Right Transverse Process, Left Transverse Process, Right Facet Joint, Left Facet Joint, and Spinous Process       No tenderness with palpation              Hip Strength - Planes of Motion   Right Strength Right Pain Left Strength Left  Pain   Flexion (L2) 5   5     Extension 4-   5     ABduction 4-   5     ADduction           Internal Rotation           External Rotation               Knee Strength   Right Strength Right Pain Left Strength Left  Pain   Flexion (S2) 5   5     Prone Flexion           Extension (L3) 4   4            Ankle/Foot Strength - Planes of Motion   Right Strength Right Pain Left Strength Left  Pain   Dorsiflexion (L4) 5   5     Plantar Flexion (S1)           Inversion           Eversion           Great Toe Flexion           Great Toe Extension (L5)           Lesser Toes Flexion           Lesser Toes Extension                       Cervical/Thoracic Special Tests  Thoracic Tests  Positive: Slump  Left worse than right with sitting slump positive test      Lumbar/Pelvic Girdle Special Tests  Lumbar Tests - Repeated  Positive: Flexion  pain in the middle of the low back    Lumbar Tests - SLR and Tension  Negative: Right Passive Straight Leg Raise and Left Passive Straight Leg Raise                 Pelvic Girdle / Sacrum Tests  Negative: Right BOBY and Left BOBY         Hip Special Tests  Intra-Articular/Impingement Tests  Negative: Right BOBY and Left BOBY              Four Stage  Balance Test                 Single Leg Stand - Right Foot: 10 sec  Single Leg Stand - Left Foot: 10 sec       Ambulation Assistance Required  Surface With  Assistive Device Without Assistive Device Details   Level   Independent      Uneven   Independent     Curb   Independent       Stairs Assistance Required   Assistance Level Upper Extremity Support Pattern   Ascending Independent       Descending Independent            Gait Analysis  Base of Support: Normal                 Assessment & Plan   Assessment  Horacio presents with a condition of Low complexity.   Presentation of Symptoms: Stable  Will Comorbidities Impact Care: No       Impairments: Impaired physical strength    Prognosis: Good  Assessment Details: Patient is a 59 year old male coming to therapy with intermittent dull ache low back pain. Patient has radicular symptoms bilaterally that presents as a burning that worsens with walking for short distances. Patient did have an initial low back injury 4 years ago and is currently having the same symptoms. 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 The pain worsens with forward bending and squatting. Patient does have relief with heat, massage and lying down or resting. Patient presents with positive left sitting slump test but is negative with supine SLR test. Patient does have right hip and core instability compared to left. Does have mild bilateral hamstring tightness and limited with generalized flexibility. Good balance. Patient will benefit from skilled physical therapy at this time to address deficits with core/hip stabilization, flexibility exercises, traction, myofascial release/massage as needed.     Plan  From a physical therapy perspective, the patient would benefit from: Skilled Rehab Services    Planned therapy interventions include: Therapeutic exercise, Therapeutic activities, Neuromuscular re-education, Manual therapy, and Aquatic therapy.     Planned modalities to include: Electrical stimulation - passive/unattended, Mechanical traction, Ultrasound, and Other (Comment). Dry Needling      Visit Frequency: 2 times Per Week for 12 Weeks.       This plan was discussed with Patient.   Discussion participants: Agreed Upon Plan of Care             Patient's spiritual, cultural, and educational needs considered and patient agreeable to plan of care and goals.     Education  Education was done with Patient. The patient's learning style includes Demonstration and Listening. The patient Demonstrates understanding.         Taught patient sciatic nerve glides for HEP       Goals:   Active       Ambulation/movement       Patient will walk ~ 30 minutes with no onset of radicular symptoms       Start:  03/03/25    Expected End:  04/14/25               Functional outcome       Patient will demonstrate independence in home program for support of progression       Start:  03/03/25    Expected End:  05/26/25               Maintaining body position       Patient will maintain forward bend/squatting position to complete work duties for full work day with 2/10 pain       Start:  03/03/25    Expected End:  04/14/25               Pain       Patient will report pain of 0/10 demonstrating a reduction of overall pain       Start:  03/03/25    Expected End:  05/26/25               Strength       Patient will achieve right hip abduction strength of 5/5       Start:  03/03/25    Expected End:  05/26/25            Patient will achieve right knee extension strength of 5/5       Start:  03/03/25    Expected End:  04/14/25              Physical Therapy supervisory visit performed with Jacquelyn Childs PTA to discuss plan of care and goals     Clinical Information for Insurance Authorization     Dates of Services: 03/03/25 to 05/26/25  Discharge Plan: Patient will be discharged from skilled physical therapy treatment once all goals have been met, patient has plateaued, or  physician/insurance requests discontinuation of care. Patient will be discharged with a home exercise program.   Type of therapy: Rehabilitative  ICD-10 Diagnosis Code(s): M54.42,M54.41  Which side is symptomatic? Not applicable and/or symptoms are not localized  Surgical: No  Surgical procedure: N/A  Surgery date: N/A.  Presenting symptoms/diagnoses are repetitive in nature.  Presenting symptoms are radiating in nature.   The rehabilitation is not related to a diagnosis of cancer.  The rehabilitation is not related to a diagnosis of lymphedema.  Patient's clinical presentation is:  Moderate objective and functional deficits: consistent symptoms and/or symptoms that are intensified with activity with moderate loss of range of motion, strength, or ability to perform daily tasks  CPT Codes Requested:  72866 [therapeutic exercise], 30602 [neuromuscular re-education], 08916 [manual therapy], 92906 [therapeutic activities], 10853 [aquatic therapy], 22747 [ultrasound], 11493 [unattended electrical stimulation], and 10575 [mechanical traction]     RASHAD SNYDER, PT

## 2025-03-05 ENCOUNTER — CLINICAL SUPPORT (OUTPATIENT)
Dept: REHABILITATION | Facility: HOSPITAL | Age: 60
End: 2025-03-05
Payer: COMMERCIAL

## 2025-03-05 DIAGNOSIS — R52 PAIN: Primary | ICD-10-CM

## 2025-03-05 PROCEDURE — 97110 THERAPEUTIC EXERCISES: CPT | Mod: CQ

## 2025-03-05 PROCEDURE — 97112 NEUROMUSCULAR REEDUCATION: CPT | Mod: CQ

## 2025-03-05 PROCEDURE — 97012 MECHANICAL TRACTION THERAPY: CPT | Mod: CQ

## 2025-03-05 NOTE — PROGRESS NOTES
Outpatient Rehab    Physical Therapy Visit    Patient Name: Horacio Coello  MRN: [id number]  YOB: 1965  Encounter Date: 3/5/2025    Therapy Diagnosis:   Encounter Diagnosis   Name Primary?    Pain Yes     Physician: Carol Simons DO    Physician Orders: Eval and Treat  Medical Diagnosis: Lumbar Radiuclopathy     Visit # / Visits Authorized:  2 /10  Date of Evaluation:  3/3/2025   Insurance Authorization Period: pending auth  Plan of Care Certification:  3/3/2025 to 5/26/25      Time In: 1600   Time Out: 1647  Total Time: 47   Total Billable Time: 47    FOTO:  Intake Score:  %  Survey Score 1:  %  Survey Score 2:  %         Subjective   I had to work today. The pain is a 5/10..  Pain reported as 5/10.      Objective            Treatment:  Therapeutic Exercise  Therapeutic Exercise Activity 1: Bike 5 minutes  Therapeutic Exercise Activity 2: slant board 4 x 15 second hold  Therapeutic Exercise Activity 3: Hamstring stretch 4 x 15 second hold  Therapeutic Exercise Activity 4: piriformis stretch 4 x 15 second hold  Therapeutic Exercise Activity 5: ball roll outs 5 x 5 second hold    Balance/Neuromuscular Re-Education  Balance/Neuromuscular Re-Education Activity 1: Cybe back extension 3 x 10 3#  Balance/Neuromuscular Re-Education Activity 2: Cybex hip flexion x 10  Balance/Neuromuscular Re-Education Activity 3: cybex hip abduction x 10  Balance/Neuromuscular Re-Education Activity 4: Cybex hip extension x 10    Other Activity  Other Activity 1: Lumbar traction 60/20 x 15 minutes 60 lbs (increase next visit)    Assessment & Plan   Assessment: Case conference with Cordelia Velazco DPT. Nick was informed on correct body mechanics to perform exercises correctly. Nick had some burning in legs durning cybex hip machine. Ended session with lumbar traction to help with burning in lumbar spine and lower extermities. increase traction pull next visit.  Evaluation/Treatment Tolerance: Patient tolerated  treatment well    Patient will continue to benefit from skilled outpatient physical therapy to address the deficits listed in the problem list box on initial evaluation, provide pt/family education and to maximize pt's level of independence in the home and community environment.     Patient's spiritual, cultural, and educational needs considered and patient agreeable to plan of care and goals.     Education  Education was done with Patient. The patient's learning style includes Demonstration and Listening. The patient Demonstrates understanding and Verbalizes understanding.                 Plan: continue current POC    Goals:   Active       Ambulation/movement       Patient will walk ~ 30 minutes with no onset of radicular symptoms       Start:  03/03/25    Expected End:  04/14/25               Functional outcome       Patient will demonstrate independence in home program for support of progression       Start:  03/03/25    Expected End:  05/26/25               Maintaining body position       Patient will maintain forward bend/squatting position to complete work duties for full work day with 2/10 pain       Start:  03/03/25    Expected End:  04/14/25               Pain       Patient will report pain of 0/10 demonstrating a reduction of overall pain       Start:  03/03/25    Expected End:  05/26/25               Strength       Patient will achieve right hip abduction strength of 5/5       Start:  03/03/25    Expected End:  05/26/25            Patient will achieve right knee extension strength of 5/5       Start:  03/03/25    Expected End:  04/14/25                Jacquelyn Childs PTA

## 2025-03-11 ENCOUNTER — CLINICAL SUPPORT (OUTPATIENT)
Dept: REHABILITATION | Facility: HOSPITAL | Age: 60
End: 2025-03-11
Payer: COMMERCIAL

## 2025-03-11 DIAGNOSIS — R52 PAIN: Primary | ICD-10-CM

## 2025-03-11 PROCEDURE — 97012 MECHANICAL TRACTION THERAPY: CPT | Mod: CQ

## 2025-03-11 PROCEDURE — 97110 THERAPEUTIC EXERCISES: CPT | Mod: CQ

## 2025-03-11 PROCEDURE — 97112 NEUROMUSCULAR REEDUCATION: CPT | Mod: CQ

## 2025-03-11 NOTE — PROGRESS NOTES
Outpatient Rehab    Physical Therapy Visit    Patient Name: Horacio Coello  MRN: [id number]  YOB: 1965  Encounter Date: 3/11/2025    Therapy Diagnosis:   Encounter Diagnosis   Name Primary?    Pain Yes     Physician: Carol Simons DO    Physician Orders: Eval and Treat  Medical Diagnosis: Lumbar Radiuclopathy     Visit # / Visits Authorized:  3 /10  Date of Evaluation:  3/3/2025   Insurance Authorization Period: pending auth  Plan of Care Certification:  3/3/2025 to 5/26/25      Time In: 1600   Time Out: 1653  Total Time: 53   Total Billable Time: 53    FOTO:  Intake Score:  %  Survey Score 1:  %  Survey Score 2:  %         Subjective   No pain today..  Pain reported as 0/10.      Objective            Treatment:  Therapeutic Exercise  Therapeutic Exercise Activity 1: Bike 5 minutes  Therapeutic Exercise Activity 2: slant board 4 x 15 second hold  Therapeutic Exercise Activity 3: Hamstring stretch 4 x 15 second hold  Therapeutic Exercise Activity 4: piriformis stretch 4 x 15 second hold  Therapeutic Exercise Activity 5: ball roll outs 5 x 5 second hold    Balance/Neuromuscular Re-Education  Balance/Neuromuscular Re-Education Activity 1: Cybe back extension 3 x 10 3#  Balance/Neuromuscular Re-Education Activity 2: Cybex hip flexion 2 x 10  Balance/Neuromuscular Re-Education Activity 3: cybex hip abduction 2 x 10  Balance/Neuromuscular Re-Education Activity 4: Cybex hip extension 2 x 10    Other Activity  Other Activity 1: Lumbar traction 60/20 x 15 minutes 65 lbs    Assessment & Plan   Assessment: Case conference with Cordelia Velazco DPT. Nick was informed on correct body mechanics to perform exercises correctly. Nick had some burning in legs durning cybex hip machine. Ended session with lumbar traction to help with burning in lumbar spine and lower extermities.  Evaluation/Treatment Tolerance: Patient tolerated treatment well    Patient will continue to benefit from skilled outpatient  physical therapy to address the deficits listed in the problem list box on initial evaluation, provide pt/family education and to maximize pt's level of independence in the home and community environment.     Patient's spiritual, cultural, and educational needs considered and patient agreeable to plan of care and goals.     Education  Education was done with Patient. The patient's learning style includes Demonstration and Listening. The patient Verbalizes understanding and Demonstrates understanding.                 Plan: continue current POC    Goals:   Active       Ambulation/movement       Patient will walk ~ 30 minutes with no onset of radicular symptoms       Start:  03/03/25    Expected End:  04/14/25               Functional outcome       Patient will demonstrate independence in home program for support of progression       Start:  03/03/25    Expected End:  05/26/25               Maintaining body position       Patient will maintain forward bend/squatting position to complete work duties for full work day with 2/10 pain       Start:  03/03/25    Expected End:  04/14/25               Pain       Patient will report pain of 0/10 demonstrating a reduction of overall pain       Start:  03/03/25    Expected End:  05/26/25               Strength       Patient will achieve right hip abduction strength of 5/5       Start:  03/03/25    Expected End:  05/26/25            Patient will achieve right knee extension strength of 5/5       Start:  03/03/25    Expected End:  04/14/25                Jacquelyn Childs PTA

## 2025-03-13 ENCOUNTER — CLINICAL SUPPORT (OUTPATIENT)
Dept: REHABILITATION | Facility: HOSPITAL | Age: 60
End: 2025-03-13
Payer: COMMERCIAL

## 2025-03-13 DIAGNOSIS — R52 PAIN: Primary | ICD-10-CM

## 2025-03-13 PROCEDURE — 97112 NEUROMUSCULAR REEDUCATION: CPT

## 2025-03-13 PROCEDURE — 97110 THERAPEUTIC EXERCISES: CPT

## 2025-03-13 NOTE — PROGRESS NOTES
Outpatient Rehab    Physical Therapy Visit    Patient Name: Horacio Coello  MRN: [id number]  YOB: 1965  Encounter Date: 3/13/2025    Therapy Diagnosis:   Encounter Diagnosis   Name Primary?    Pain Yes     Physician: Carol Simons DO    Physician Orders: Eval and Treat  Medical Diagnosis: Lumbar Radiuclopathy     Visit # / Visits Authorized:  4 / 11  Date of Evaluation:  3/3/2025   Insurance Authorization Period: 3/3/25 - 6/1/25  Plan of Care Certification:  3/3/2025 to 5/26/25        Time In: 0718   Time Out: 0812  Total Time: 54   Total Billable Time: 53    FOTO:  Intake Score:  %  Survey Score 1:  %  Survey Score 2:  %         Subjective   Woke with a 2/10 pain but currently a 1/10. Therapy and stretching is helping.  Pain reported as 1/10.      Objective            Treatment:  Therapeutic Exercise  Therapeutic Exercise Activity 1: Bike 5 minutes  Therapeutic Exercise Activity 2: slant board 4 x 15 second hold  Therapeutic Exercise Activity 3: Hamstring stretch 4 x 15 second hold  Therapeutic Exercise Activity 4: piriformis stretch 4 x 15 second hold  Therapeutic Exercise Activity 5: ball roll outs 5 x 5 second hold    Balance/Neuromuscular Re-Education  Balance/Neuromuscular Re-Education Activity 1: Cybe back extension 3 x 10 4#  Balance/Neuromuscular Re-Education Activity 2: Cybex hip flexion 2 x 10 2pl  Balance/Neuromuscular Re-Education Activity 3: cybex hip abduction 2 x 10 2pl  Balance/Neuromuscular Re-Education Activity 4: Cybex hip extension 2 x 10 2pl  Balance/Neuromuscular Re-Education Activity 5: Cybex quad 2pl x 20    Assessment & Plan   Assessment: Patient completed core/hip stabilization and flexibility/stretching exericses. Patient mentioned several times throughout the session how much he enjoys the exercises and that it is relieving his symptoms. Added cybex quad secondary to the quad muscles being weak during evaluation testing. Patient did fatigue but no increase in  symptoms. Will progress as able.  Evaluation/Treatment Tolerance: Patient tolerated treatment well    Patient will continue to benefit from skilled outpatient physical therapy to address the deficits listed in the problem list box on initial evaluation, provide pt/family education and to maximize pt's level of independence in the home and community environment.     Patient's spiritual, cultural, and educational needs considered and patient agreeable to plan of care and goals.           Plan: continue current POC    Goals:   Active       Ambulation/movement       Patient will walk ~ 30 minutes with no onset of radicular symptoms (Progressing)       Start:  03/03/25    Expected End:  04/14/25               Functional outcome       Patient will demonstrate independence in home program for support of progression (Progressing)       Start:  03/03/25    Expected End:  05/26/25               Maintaining body position       Patient will maintain forward bend/squatting position to complete work duties for full work day with 2/10 pain (Progressing)       Start:  03/03/25    Expected End:  04/14/25               Pain       Patient will report pain of 0/10 demonstrating a reduction of overall pain (Progressing)       Start:  03/03/25    Expected End:  05/26/25               Strength       Patient will achieve right hip abduction strength of 5/5 (Progressing)       Start:  03/03/25    Expected End:  05/26/25            Patient will achieve right knee extension strength of 5/5 (Progressing)       Start:  03/03/25    Expected End:  04/14/25                RASHAD SNYDER, PT

## 2025-03-18 ENCOUNTER — CLINICAL SUPPORT (OUTPATIENT)
Dept: REHABILITATION | Facility: HOSPITAL | Age: 60
End: 2025-03-18
Payer: COMMERCIAL

## 2025-03-18 DIAGNOSIS — R52 PAIN: Primary | ICD-10-CM

## 2025-03-18 PROCEDURE — 97112 NEUROMUSCULAR REEDUCATION: CPT | Mod: CQ

## 2025-03-18 PROCEDURE — 97110 THERAPEUTIC EXERCISES: CPT | Mod: CQ

## 2025-03-18 NOTE — PROGRESS NOTES
Outpatient Rehab    Physical Therapy Visit    Patient Name: Horacio Coello  MRN: [id number]  YOB: 1965  Encounter Date: 3/18/2025    Therapy Diagnosis:   Encounter Diagnosis   Name Primary?    Pain Yes     Physician: aCrol Simons DO    Physician Orders: Eval and Treat  Medical Diagnosis: Acute midline low back pain with bilateral sciatica    Visit # / Visits Authorized:  4 / 10  Date of Evaluation:  3/3/2025   Insurance Authorization Period: 3/3/2025 to 6/1/2025  Plan of Care Certification:   3/3/2025 to 5/26/25      PT/PTA: PTA   Number of PTA visits since last PT visit:1  Time In: 1603   Time Out: 1648  Total Time: 45   Total Billable Time: 45    FOTO:  Intake Score:  %  Survey Score 1:  %  Survey Score 2:  %         Subjective   Painis a 2/10 today, in the right hip..  Pain reported as 2/10.      Objective            Treatment:  Therapeutic Exercise  TE 1: Bike 5 minutes  TE 2: slant board 4 x 15 second hold  TE 3: Hamstring stretch 4 x 15 second hold  TE 4: piriformis stretch 4 x 15 second hold  TE 5: ball roll outs 5 x 5 second hold  Balance/Neuromuscular Re-Education  NMR 1: Cybe back extension 3 x 10 4#  NMR 2: Cybex hip flexion 2 x 10 2pl  NMR 3: cybex hip abduction 2 x 10 2pl  NMR 4: Cybex hip extension 2 x 10 2pl  NMR 5: Cybex quad 2pl x 20    Time Entry(in minutes):  Neuromuscular Re-Education Time Entry: 25  Therapeutic Exercise Time Entry: 20    Assessment & Plan   Assessment: Patient completed core/hip stabilization and flexibility/stretching exericses. Patient mentioned several times throughout the session how much he enjoys the exercises and that it is relieving his symptoms. Patient did fatigue but no increase in symptoms. Will progress as able.  Evaluation/Treatment Tolerance: Patient tolerated treatment well    Patient will continue to benefit from skilled outpatient physical therapy to address the deficits listed in the problem list box on initial evaluation, provide  pt/family education and to maximize pt's level of independence in the home and community environment.     Patient's spiritual, cultural, and educational needs considered and patient agreeable to plan of care and goals.     Education  Education was done with Patient. The patient's learning style includes Demonstration and Listening. The patient Verbalizes understanding and Demonstrates understanding.                 Plan: continue current POC    Goals:   Active       Ambulation/movement       Patient will walk ~ 30 minutes with no onset of radicular symptoms (Progressing)       Start:  03/03/25    Expected End:  04/14/25               Functional outcome       Patient will demonstrate independence in home program for support of progression (Progressing)       Start:  03/03/25    Expected End:  05/26/25               Maintaining body position       Patient will maintain forward bend/squatting position to complete work duties for full work day with 2/10 pain (Progressing)       Start:  03/03/25    Expected End:  04/14/25               Pain       Patient will report pain of 0/10 demonstrating a reduction of overall pain (Progressing)       Start:  03/03/25    Expected End:  05/26/25               Strength       Patient will achieve right hip abduction strength of 5/5 (Progressing)       Start:  03/03/25    Expected End:  05/26/25            Patient will achieve right knee extension strength of 5/5 (Progressing)       Start:  03/03/25    Expected End:  04/14/25                Jacquelyn Childs PTA

## 2025-03-20 ENCOUNTER — CLINICAL SUPPORT (OUTPATIENT)
Dept: REHABILITATION | Facility: HOSPITAL | Age: 60
End: 2025-03-20
Payer: COMMERCIAL

## 2025-03-20 DIAGNOSIS — R52 PAIN: Primary | ICD-10-CM

## 2025-03-20 PROCEDURE — 97110 THERAPEUTIC EXERCISES: CPT | Mod: CQ

## 2025-03-20 PROCEDURE — 97012 MECHANICAL TRACTION THERAPY: CPT | Mod: CQ

## 2025-03-20 PROCEDURE — 97112 NEUROMUSCULAR REEDUCATION: CPT | Mod: CQ

## 2025-03-20 NOTE — PROGRESS NOTES
Outpatient Rehab    Physical Therapy Visit    Patient Name: Horacio Coello  MRN: [id number]  YOB: 1965  Encounter Date: 3/20/2025    Therapy Diagnosis:   Encounter Diagnosis   Name Primary?    Pain Yes     Physician: Carol Simons DO    Physician Orders: Eval and Treat  Medical Diagnosis: Acute midline low back pain with bilateral sciatica    Visit # / Visits Authorized:  5 / 10  Date of Evaluation: 3/3/2025  Insurance Authorization Period: 3/3/2025 to 6/1/2025  Plan of Care Certification:  3/3/2025 to 5/26/25      PT/PTA: PTA   Number of PTA visits since last PT visit:2  Time In: 1600   Time Out: 1653  Total Time: 53   Total Billable Time: 53    FOTO:  Intake Score:  %  Survey Score 1:  %  Survey Score 2:  %         Subjective   Pain is a 1/10 today. Pain is getting better and i am able to bend over further..         Objective            Treatment:  Therapeutic Exercise  TE 1: Bike 5 minutes  TE 2: slant board 4 x 15 second hold  TE 3: Hamstring stretch 4 x 15 second hold  TE 4: piriformis stretch 4 x 15 second hold  TE 5: ball roll outs 5 x 5 second hold  Balance/Neuromuscular Re-Education  NMR 1: Cybe back extension 3 x 10 4#  NMR 2: Cybex hip flexion 2 x 10 2pl  NMR 3: cybex hip abduction 2 x 10 2pl  NMR 4: Cybex hip extension 2 x 10 2pl  Other Activities  Activity 1: Lumbar traction 60/20 x 15 minutes 70 lbs    Time Entry(in minutes):  Mechanical Traction Time Entry: 15  Neuromuscular Re-Education Time Entry: 23  Therapeutic Exercise Time Entry: 15    Assessment & Plan   Assessment: Patient completed core/hip stabilization and flexibility/stretching exericses. Patient mentioned several times throughout the session how much he enjoys the exercises and that it is relieving his symptoms. Patient did fatigue but no increase in symptoms. ended session with lumbar traction to help with decompression of the spine. Will progress as able.  Evaluation/Treatment Tolerance: Patient tolerated treatment  well    Patient will continue to benefit from skilled outpatient physical therapy to address the deficits listed in the problem list box on initial evaluation, provide pt/family education and to maximize pt's level of independence in the home and community environment.     Patient's spiritual, cultural, and educational needs considered and patient agreeable to plan of care and goals.     Education  Education was done with Patient. The patient's learning style includes Demonstration and Listening. The patient Verbalizes understanding and Demonstrates understanding.                 Plan: continue current POC    Goals:   Active       Ambulation/movement       Patient will walk ~ 30 minutes with no onset of radicular symptoms (Progressing)       Start:  03/03/25    Expected End:  04/14/25               Functional outcome       Patient will demonstrate independence in home program for support of progression (Progressing)       Start:  03/03/25    Expected End:  05/26/25               Maintaining body position       Patient will maintain forward bend/squatting position to complete work duties for full work day with 2/10 pain (Progressing)       Start:  03/03/25    Expected End:  04/14/25               Pain       Patient will report pain of 0/10 demonstrating a reduction of overall pain (Progressing)       Start:  03/03/25    Expected End:  05/26/25               Strength       Patient will achieve right hip abduction strength of 5/5 (Progressing)       Start:  03/03/25    Expected End:  05/26/25            Patient will achieve right knee extension strength of 5/5 (Progressing)       Start:  03/03/25    Expected End:  04/14/25                Jacquelyn Childs PTA

## 2025-03-21 ENCOUNTER — OFFICE VISIT (OUTPATIENT)
Dept: CARDIOLOGY | Facility: CLINIC | Age: 60
End: 2025-03-21
Payer: COMMERCIAL

## 2025-03-21 VITALS
BODY MASS INDEX: 32.74 KG/M2 | HEART RATE: 90 BPM | HEIGHT: 68 IN | OXYGEN SATURATION: 100 % | DIASTOLIC BLOOD PRESSURE: 82 MMHG | SYSTOLIC BLOOD PRESSURE: 158 MMHG | WEIGHT: 216 LBS

## 2025-03-21 DIAGNOSIS — E78.2 MIXED HYPERLIPIDEMIA: ICD-10-CM

## 2025-03-21 DIAGNOSIS — G47.33 OSA ON CPAP: ICD-10-CM

## 2025-03-21 DIAGNOSIS — Z72.0 TOBACCO ABUSE DISORDER: ICD-10-CM

## 2025-03-21 DIAGNOSIS — R94.31 ABNORMAL ELECTROCARDIOGRAM (ECG) (EKG): ICD-10-CM

## 2025-03-21 DIAGNOSIS — R94.31 NONSPECIFIC ABNORMAL ELECTROCARDIOGRAM (ECG) (EKG): Primary | ICD-10-CM

## 2025-03-21 DIAGNOSIS — E66.9 OBESITY (BMI 30-39.9): Chronic | ICD-10-CM

## 2025-03-21 DIAGNOSIS — I10 PRIMARY HYPERTENSION: ICD-10-CM

## 2025-03-21 DIAGNOSIS — I47.10 SVT (SUPRAVENTRICULAR TACHYCARDIA): ICD-10-CM

## 2025-03-21 PROCEDURE — 3077F SYST BP >= 140 MM HG: CPT | Mod: CPTII,,, | Performed by: INTERNAL MEDICINE

## 2025-03-21 PROCEDURE — 1159F MED LIST DOCD IN RCRD: CPT | Mod: CPTII,,, | Performed by: INTERNAL MEDICINE

## 2025-03-21 PROCEDURE — 3008F BODY MASS INDEX DOCD: CPT | Mod: CPTII,,, | Performed by: INTERNAL MEDICINE

## 2025-03-21 PROCEDURE — 99214 OFFICE O/P EST MOD 30 MIN: CPT | Mod: PBBFAC | Performed by: INTERNAL MEDICINE

## 2025-03-21 PROCEDURE — 3079F DIAST BP 80-89 MM HG: CPT | Mod: CPTII,,, | Performed by: INTERNAL MEDICINE

## 2025-03-21 PROCEDURE — 99999 PR PBB SHADOW E&M-EST. PATIENT-LVL IV: CPT | Mod: PBBFAC,,, | Performed by: INTERNAL MEDICINE

## 2025-03-21 PROCEDURE — 4010F ACE/ARB THERAPY RXD/TAKEN: CPT | Mod: CPTII,,, | Performed by: INTERNAL MEDICINE

## 2025-03-21 PROCEDURE — 99214 OFFICE O/P EST MOD 30 MIN: CPT | Mod: S$PBB,,, | Performed by: INTERNAL MEDICINE

## 2025-03-21 NOTE — PROGRESS NOTES
PCP: Shiela Lopez MD    Referring Provider:     Subjective:   Horacio Coello is a 59 y.o. male with hx of hypertension who presents for evaluation of abnormal EKG.   He underwent routine physical, found to have an abnormal EKG, was referred for evaluation. He is very active as a , without provocation of chest pain, pressure or shortness of breath.  He continues to smoke against medical advice, and is not compliant with CPAP.   He denies previous cardiac evaluation.  PT was scheduled for treadmill cardiolyte and echo, cancelled both when he reinjured his back.  He has undergone evaluation for back pain, diagnosed with bulging disc, undergoing PT /OT with some improvement.  Pt has SVT listed in medical record, however denies previous evaluation for SVT.             Fhx:  Family History   Problem Relation Name Age of Onset    Diabetes Mother      Hypertension Father          Also Gout       History reviewed. No pertinent surgical history.      EKG - NSR, RBBB    ECHO - No results found for this or any previous visit.       CATH - No results found for this or any previous visit.       Stress - No results found for this or any previous visit.       Lab Results   Component Value Date     12/24/2024    K 3.3 (L) 12/24/2024    CL 98 12/24/2024    CO2 30 12/24/2024    BUN 13 12/24/2024    CREATININE 1.01 12/24/2024    CALCIUM 9.3 12/24/2024    ANIONGAP 13 12/24/2024    ESTGFRAFRICA 85 12/07/2021    EGFRNONAA 93 07/18/2022       Lab Results   Component Value Date    CHOL 117 09/24/2024    CHOL 145 12/05/2022    CHOL 145 11/21/2022     Lab Results   Component Value Date    HDL 31 (L) 09/24/2024    HDL 35 (L) 12/05/2022    HDL 30 (L) 11/21/2022     Lab Results   Component Value Date    LDLCALC 50 09/24/2024    LDLCALC 87 12/05/2022    LDLCALC 73 11/21/2022     Lab Results   Component Value Date    TRIG 181 (H) 09/24/2024    TRIG 113 12/05/2022    TRIG 209 (H) 11/21/2022     Lab Results   Component  Value Date    CHOLHDL 3.8 09/24/2024    CHOLHDL 4.1 12/05/2022    CHOLHDL 4.8 11/21/2022       Lab Results   Component Value Date    WBC 9.77 09/24/2024    HGB 15.6 09/24/2024    HCT 47.2 09/24/2024    MCV 92.7 09/24/2024     09/24/2024           Current Outpatient Medications:     amLODIPine (NORVASC) 10 MG tablet, Take 1 tablet (10 mg total) by mouth once daily., Disp: 90 each, Rfl: 1    atorvastatin (LIPITOR) 40 MG tablet, Take 1 tablet (40 mg total) by mouth once daily., Disp: 90 tablet, Rfl: 1    diclofenac sodium (VOLTAREN ARTHRITIS PAIN) 1 % Gel, Apply 2 g topically 4 (four) times daily as needed., Disp: 1 each, Rfl: 2    hydroCHLOROthiazide (HYDRODIURIL) 25 MG tablet, Take 1 tablet (25 mg total) by mouth 2 (two) times daily., Disp: 180 tablet, Rfl: 1    losartan (COZAAR) 100 MG tablet, Take 0.5 tablets (50 mg total) by mouth once daily., Disp: 15 tablet, Rfl: 4    metoprolol succinate (TOPROL-XL) 25 MG 24 hr tablet, Take 1 tablet (25 mg total) by mouth once daily., Disp: 90 tablet, Rfl: 0    naproxen (NAPROSYN) 500 MG tablet, Take 1 tablet (500 mg total) by mouth 2 (two) times daily as needed., Disp: 60 tablet, Rfl: 1    losartan (COZAAR) 50 MG tablet, Take 1 tablet (50 mg total) by mouth 2 (two) times a day. (Patient not taking: Reported on 3/21/2025), Disp: 90 tablet, Rfl: 0    Review of Systems   Constitutional: Negative for diaphoresis, malaise/fatigue, night sweats and weight gain.   HENT:  Negative for congestion, ear pain, hearing loss, nosebleeds and sore throat.    Eyes:  Negative for blurred vision, double vision, pain, photophobia and visual disturbance.   Cardiovascular:  Negative for chest pain, claudication, dyspnea on exertion, irregular heartbeat, leg swelling, near-syncope, orthopnea, palpitations and syncope.   Respiratory:  Positive for sleep disturbances due to breathing and snoring. Negative for cough, shortness of breath and wheezing.    Endocrine: Negative for cold intolerance,  "heat intolerance, polydipsia, polyphagia and polyuria.   Hematologic/Lymphatic: Negative for bleeding problem. Does not bruise/bleed easily.   Skin:  Negative for dry skin, flushing, itching, rash and skin cancer.   Musculoskeletal:  Negative for arthritis, back pain, falls, joint pain, muscle cramps, muscle weakness and myalgias.   Gastrointestinal:  Negative for abdominal pain, change in bowel habit, constipation, diarrhea, dysphagia, heartburn, nausea and vomiting.   Genitourinary:  Negative for bladder incontinence, dysuria, flank pain, frequency and nocturia.   Neurological:  Negative for dizziness, focal weakness, headaches, light-headedness, loss of balance, numbness, paresthesias and seizures.   Psychiatric/Behavioral:  Negative for depression, memory loss and substance abuse. The patient is not nervous/anxious.    Allergic/Immunologic: Negative for environmental allergies.          Objective:   BP (!) 158/82 (BP Location: Left arm, Patient Position: Sitting)   Pulse 90   Ht 5' 8" (1.727 m)   Wt 98 kg (216 lb)   SpO2 100%   BMI 32.84 kg/m²       Physical Exam  Vitals and nursing note reviewed.   Constitutional:       Appearance: Normal appearance. He is obese.   HENT:      Head: Normocephalic and atraumatic.      Right Ear: External ear normal.      Left Ear: External ear normal.   Eyes:      General: No scleral icterus.        Right eye: No discharge.         Left eye: No discharge.      Extraocular Movements: Extraocular movements intact.      Conjunctiva/sclera: Conjunctivae normal.      Pupils: Pupils are equal, round, and reactive to light.   Cardiovascular:      Rate and Rhythm: Normal rate and regular rhythm.      Pulses: Normal pulses.      Heart sounds: Normal heart sounds. No murmur heard.     No friction rub. No gallop.   Pulmonary:      Effort: Pulmonary effort is normal.      Breath sounds: Normal breath sounds. No wheezing, rhonchi or rales.   Chest:      Chest wall: No tenderness. "   Abdominal:      General: Abdomen is flat. Bowel sounds are normal. There is no distension.      Palpations: Abdomen is soft.      Tenderness: There is no abdominal tenderness. There is no guarding or rebound.   Musculoskeletal:         General: No swelling or tenderness. Normal range of motion.      Cervical back: Normal range of motion and neck supple.   Skin:     General: Skin is warm and dry.      Findings: No erythema or rash.   Neurological:      General: No focal deficit present.      Mental Status: He is alert and oriented to person, place, and time.      Cranial Nerves: No cranial nerve deficit.      Motor: No weakness.      Gait: Gait normal.   Psychiatric:         Mood and Affect: Mood normal.         Behavior: Behavior normal.         Thought Content: Thought content normal.         Judgment: Judgment normal.         Assessment:     1. SVT (supraventricular tachycardia)        2. Primary hypertension        3. Mixed hyperlipidemia        4. Obesity (BMI 30-39.9)        5. JORGE on CPAP              Plan:   Order lexiscan  cardiolyte,  smoking cessaton, refer to sleep medicine for recommendations of tx for JORGE,

## 2025-03-21 NOTE — PATIENT INSTRUCTIONS
Stress test scheduled for April 9th at 8:00.  Follow up with  on May 5th at 2:40 to review test results.

## 2025-04-01 ENCOUNTER — CLINICAL SUPPORT (OUTPATIENT)
Dept: REHABILITATION | Facility: HOSPITAL | Age: 60
End: 2025-04-01
Payer: COMMERCIAL

## 2025-04-01 DIAGNOSIS — R52 PAIN: Primary | ICD-10-CM

## 2025-04-01 PROCEDURE — 97112 NEUROMUSCULAR REEDUCATION: CPT

## 2025-04-01 PROCEDURE — 97110 THERAPEUTIC EXERCISES: CPT

## 2025-04-01 NOTE — PROGRESS NOTES
Outpatient Rehab    Physical Therapy Visit    Patient Name: Horacio Coello  MRN: [id number]  YOB: 1965  Encounter Date: 4/1/2025    Therapy Diagnosis:   Encounter Diagnosis   Name Primary?    Pain Yes     Physician: Carol Simons DO    Physician Orders: Eval and Treat  Medical Diagnosis: Acute midline low back pain with bilateral sciatica    Visit # / Visits Authorized:  6 / 10  Insurance Authorization Period: 3/3/2025 to 6/1/2025  Date of Evaluation:  3/3/2025   Plan of Care Certification:  3/3/2025 to 5/26/25        PT/PTA:     Number of PTA visits since last PT visit:   Time In: 0708   Time Out: 0803  Total Time: 55   Total Billable Time:      FOTO:  Intake Score:  %  Survey Score 1:  %  Survey Score 2:  %         Subjective   I am doing so much better. The back pain is better and I am able to bend forward. Burning in the hip with walking still occurs..  Pain reported as 1/10.      Objective            Treatment:  Therapeutic Exercise  TE 1: Bike 5 minutes  TE 2: slant board 4 x 15 second hold  TE 3: Hamstring stretch 4 x 15 second hold  TE 4: piriformis stretch 4 x 15 second hold  TE 5: ball roll outs 5 x 5 second hold  TE 6: Supine thoracic stretch over green firm bolster  TE 7: SL right trunk rotation stretch  Balance/Neuromuscular Re-Education  NMR 1: Cybe back extension 4 x 10 6#  NMR 2: Cybex hip flexion 3 x 10 3pl  NMR 3: cybex hip abduction 3 x 10 2pl  NMR 4: Cybex hip extension 3 x 10 3pl  NMR 5: Cybex quad 2pl x 30  Other Activities  Activity 2: green theraroller to right ITB    Time Entry(in minutes):  Neuromuscular Re-Education Time Entry: 28  Therapeutic Exercise Time Entry: 27    Assessment & Plan   Assessment: Patient is doing much better with overall improvement and progress toward goals but does continue to have right hip burning pain. Added to repetitions and patient did fatigue. Used roller on the right leg to attempt to ease discomfort and added thoracic extension with  bolster for patient to get end range stretch of the spine. Progress patient as able.  Evaluation/Treatment Tolerance: Patient tolerated treatment well    Patient will continue to benefit from skilled outpatient physical therapy to address the deficits listed in the problem list box on initial evaluation, provide pt/family education and to maximize pt's level of independence in the home and community environment.     Patient's spiritual, cultural, and educational needs considered and patient agreeable to plan of care and goals.     Education  Education was done with Patient. The patient's learning style includes Listening. The patient Verbalizes understanding.                 Plan: continue current POC    Goals:   Active       Ambulation/movement       Patient will walk ~ 30 minutes with no onset of radicular symptoms (Progressing)       Start:  03/03/25    Expected End:  04/14/25               Functional outcome       Patient will demonstrate independence in home program for support of progression (Progressing)       Start:  03/03/25    Expected End:  05/26/25               Maintaining body position       Patient will maintain forward bend/squatting position to complete work duties for full work day with 2/10 pain (Progressing)       Start:  03/03/25    Expected End:  04/14/25               Pain       Patient will report pain of 0/10 demonstrating a reduction of overall pain (Progressing)       Start:  03/03/25    Expected End:  05/26/25               Strength       Patient will achieve right hip abduction strength of 5/5 (Progressing)       Start:  03/03/25    Expected End:  05/26/25            Patient will achieve right knee extension strength of 5/5 (Progressing)       Start:  03/03/25    Expected End:  04/14/25                RASHAD SNYDER, PT

## 2025-04-03 ENCOUNTER — CLINICAL SUPPORT (OUTPATIENT)
Dept: REHABILITATION | Facility: HOSPITAL | Age: 60
End: 2025-04-03
Payer: COMMERCIAL

## 2025-04-03 DIAGNOSIS — R52 PAIN: Primary | ICD-10-CM

## 2025-04-03 PROCEDURE — 97112 NEUROMUSCULAR REEDUCATION: CPT | Mod: CQ

## 2025-04-03 PROCEDURE — 97110 THERAPEUTIC EXERCISES: CPT | Mod: CQ

## 2025-04-03 NOTE — PROGRESS NOTES
Outpatient Rehab    Physical Therapy Visit    Patient Name: Horacio Coello  MRN: [id number]  YOB: 1965  Encounter Date: 4/3/2025    Therapy Diagnosis:   Encounter Diagnosis   Name Primary?    Pain Yes     Physician: Carol Simons DO    Physician Orders: Eval and Treat  Medical Diagnosis: Acute midline low back pain with bilateral sciatica    Visit # / Visits Authorized:  7 / 10  Insurance Authorization Period: 3/3/2025 to 6/1/2025  Date of Evaluation: 3/3/2025   Plan of Care Certification: 3/3/2025 to 5/26/25      PT/PTA: PTA   Number of PTA visits since last PT visit:1  Time In: 1553   Time Out: 1631  Total Time: 38   Total Billable Time: 38    FOTO:  Intake Score: 51%  Survey Score 1: 52%  Survey Score 2:  %         Subjective             Objective            Treatment:  Therapeutic Exercise  TE 1: Bike 5 minutes  TE 2: slant board 4 x 15 second hold  TE 3: Hamstring stretch 3 x 30 second hold  TE 4: piriformis stretch 4 x 15 second hold  TE 5: ball roll outs 5 x 5 second hold  TE 7: SL right trunk rotation stretch  Balance/Neuromuscular Re-Education  NMR 1: Cybe back extension 4 x 10 6#  NMR 2: Cybex hip flexion 3 x 10 3pl  NMR 3: cybex hip abduction 3 x 10 2pl  NMR 4: Cybex hip extension 3 x 10 3pl  NMR 5: Cybex quad 3pl x 30    Time Entry(in minutes):  Neuromuscular Re-Education Time Entry: 25  Therapeutic Exercise Time Entry: 15    Assessment & Plan   Assessment: Patient is doing much better with overall improvement and progress toward goals but does continue to have right hip burning pain. Added to repetitions and patient did fatigue.  Progress patient as able.  Evaluation/Treatment Tolerance: Patient tolerated treatment well    Patient will continue to benefit from skilled outpatient physical therapy to address the deficits listed in the problem list box on initial evaluation, provide pt/family education and to maximize pt's level of independence in the home and community environment.      Patient's spiritual, cultural, and educational needs considered and patient agreeable to plan of care and goals.     Education  Education was done with Patient. The patient's learning style includes Demonstration and Listening. The patient Verbalizes understanding and Demonstrates understanding.                 Plan: continue current POC    Goals:   Active       Ambulation/movement       Patient will walk ~ 30 minutes with no onset of radicular symptoms (Progressing)       Start:  03/03/25    Expected End:  04/14/25               Functional outcome       Patient will demonstrate independence in home program for support of progression (Progressing)       Start:  03/03/25    Expected End:  05/26/25               Maintaining body position       Patient will maintain forward bend/squatting position to complete work duties for full work day with 2/10 pain (Progressing)       Start:  03/03/25    Expected End:  04/14/25               Pain       Patient will report pain of 0/10 demonstrating a reduction of overall pain (Progressing)       Start:  03/03/25    Expected End:  05/26/25               Strength       Patient will achieve right hip abduction strength of 5/5 (Progressing)       Start:  03/03/25    Expected End:  05/26/25            Patient will achieve right knee extension strength of 5/5 (Progressing)       Start:  03/03/25    Expected End:  04/14/25                Jacquelyn Childs PTA

## 2025-04-08 ENCOUNTER — TELEPHONE (OUTPATIENT)
Dept: CARDIOLOGY | Facility: HOSPITAL | Age: 60
End: 2025-04-08
Payer: COMMERCIAL

## 2025-04-08 ENCOUNTER — CLINICAL SUPPORT (OUTPATIENT)
Dept: REHABILITATION | Facility: HOSPITAL | Age: 60
End: 2025-04-08
Payer: COMMERCIAL

## 2025-04-08 DIAGNOSIS — R52 PAIN: Primary | ICD-10-CM

## 2025-04-08 PROCEDURE — 97112 NEUROMUSCULAR REEDUCATION: CPT | Mod: CQ

## 2025-04-08 PROCEDURE — 97110 THERAPEUTIC EXERCISES: CPT | Mod: CQ

## 2025-04-08 NOTE — PROGRESS NOTES
Outpatient Rehab    Physical Therapy Visit    Patient Name: Horacio Coello  MRN: [id number]  YOB: 1965  Encounter Date: 4/8/2025    Therapy Diagnosis:   Encounter Diagnosis   Name Primary?    Pain Yes     Physician: Carol Simons DO    Physician Orders: Eval and Treat  Medical Diagnosis: Acute midline low back pain with bilateral sciatica    Visit # / Visits Authorized:  8 / 10  Insurance Authorization Period: 3/3/2025 to 6/1/2025  Date of Evaluation: 3/3/2025   Plan of Care Certification: 3/3/2025 to 5/26/25      PT/PTA: PTA   Number of PTA visits since last PT visit:2  Time In: 1600   Time Out: 1638  Total Time: 38   Total Billable Time: 30    FOTO:  Intake Score:  %  Survey Score 1:  %  Survey Score 2:  %         Subjective   I am doing so much better. The back pain is better. The hip still hurts..  Pain reported as 1/10.      Objective            Treatment:  Therapeutic Exercise  TE 1: Bike 5 minutes  TE 2: slant board 4 x 15 second hold  TE 3: Hamstring stretch 3 x 30 second hold  Balance/Neuromuscular Re-Education  NMR 1: Cybe back extension 4 x 10 6#  NMR 2: Cybex hip flexion 3 x 10 3pl  NMR 3: cybex hip abduction 3 x 10 2pl  NMR 4: Cybex hip extension 3 x 10 3pl  NMR 5: Cybex quad 3pl x 30  NMR 6: CYbex leg press 3 x 10 8#    Time Entry(in minutes):  Neuromuscular Re-Education Time Entry: 15  Therapeutic Exercise Time Entry: 15    Assessment & Plan   Assessment: Patient is doing much better with overall improvement and progress toward goals but does continue to have right hip burning pain. Thrapist added cybex leg press and patient did fatigue with this exercise, he also states that he has some buring in bilateral hips. Progress patient as able.  Evaluation/Treatment Tolerance: Patient tolerated treatment well    Patient will continue to benefit from skilled outpatient physical therapy to address the deficits listed in the problem list box on initial evaluation, provide pt/family  education and to maximize pt's level of independence in the home and community environment.     Patient's spiritual, cultural, and educational needs considered and patient agreeable to plan of care and goals.     Education  Education was done with Patient. The patient's learning style includes Demonstration and Listening. The patient Verbalizes understanding and Demonstrates understanding.                 Plan: continue current POC    Goals:   Active       Ambulation/movement       Patient will walk ~ 30 minutes with no onset of radicular symptoms (Progressing)       Start:  03/03/25    Expected End:  04/14/25               Functional outcome       Patient will demonstrate independence in home program for support of progression (Progressing)       Start:  03/03/25    Expected End:  05/26/25               Maintaining body position       Patient will maintain forward bend/squatting position to complete work duties for full work day with 2/10 pain (Progressing)       Start:  03/03/25    Expected End:  04/14/25               Pain       Patient will report pain of 0/10 demonstrating a reduction of overall pain (Progressing)       Start:  03/03/25    Expected End:  05/26/25               Strength       Patient will achieve right hip abduction strength of 5/5 (Progressing)       Start:  03/03/25    Expected End:  05/26/25            Patient will achieve right knee extension strength of 5/5 (Progressing)       Start:  03/03/25    Expected End:  04/14/25                Jacquelyn Childs PTA

## 2025-04-09 ENCOUNTER — HOSPITAL ENCOUNTER (OUTPATIENT)
Dept: CARDIOLOGY | Facility: HOSPITAL | Age: 60
Discharge: HOME | End: 2025-04-09
Attending: INTERNAL MEDICINE
Payer: COMMERCIAL

## 2025-04-09 ENCOUNTER — HOSPITAL ENCOUNTER (OUTPATIENT)
Dept: RADIOLOGY | Facility: HOSPITAL | Age: 60
Discharge: HOME | End: 2025-04-09
Attending: INTERNAL MEDICINE
Payer: COMMERCIAL

## 2025-04-09 DIAGNOSIS — R94.31 NONSPECIFIC ABNORMAL ELECTROCARDIOGRAM (ECG) (EKG): ICD-10-CM

## 2025-04-09 DIAGNOSIS — R94.31 ABNORMAL ELECTROCARDIOGRAM (ECG) (EKG): ICD-10-CM

## 2025-04-09 PROCEDURE — 93016 CV STRESS TEST SUPVJ ONLY: CPT | Mod: ,,, | Performed by: INTERNAL MEDICINE

## 2025-04-09 PROCEDURE — 93018 CV STRESS TEST I&R ONLY: CPT | Mod: ,,, | Performed by: INTERNAL MEDICINE

## 2025-04-09 PROCEDURE — A9500 TC99M SESTAMIBI: HCPCS | Performed by: INTERNAL MEDICINE

## 2025-04-09 PROCEDURE — 63600175 PHARM REV CODE 636 W HCPCS: Performed by: INTERNAL MEDICINE

## 2025-04-09 PROCEDURE — 78452 HT MUSCLE IMAGE SPECT MULT: CPT | Mod: TC

## 2025-04-09 PROCEDURE — 93017 CV STRESS TEST TRACING ONLY: CPT

## 2025-04-09 RX ORDER — TETRAKIS(2-METHOXYISOBUTYLISOCYANIDE)COPPER(I) TETRAFLUOROBORATE 1 MG/ML
10.9 INJECTION, POWDER, LYOPHILIZED, FOR SOLUTION INTRAVENOUS
Status: COMPLETED | OUTPATIENT
Start: 2025-04-09 | End: 2025-04-09

## 2025-04-09 RX ORDER — TETRAKIS(2-METHOXYISOBUTYLISOCYANIDE)COPPER(I) TETRAFLUOROBORATE 1 MG/ML
37 INJECTION, POWDER, LYOPHILIZED, FOR SOLUTION INTRAVENOUS
Status: COMPLETED | OUTPATIENT
Start: 2025-04-09 | End: 2025-04-09

## 2025-04-09 RX ORDER — REGADENOSON 0.08 MG/ML
0.4 INJECTION, SOLUTION INTRAVENOUS ONCE
Status: COMPLETED | OUTPATIENT
Start: 2025-04-09 | End: 2025-04-09

## 2025-04-09 RX ADMIN — REGADENOSON 0.4 MG: 0.08 INJECTION, SOLUTION INTRAVENOUS at 09:04

## 2025-04-09 RX ADMIN — KIT FOR THE PREPARATION OF TECHNETIUM TC99M SESTAMIBI 37 MILLICURIE: 1 INJECTION, POWDER, LYOPHILIZED, FOR SOLUTION PARENTERAL at 09:04

## 2025-04-09 RX ADMIN — KIT FOR THE PREPARATION OF TECHNETIUM TC99M SESTAMIBI 10.9 MILLICURIE: 1 INJECTION, POWDER, LYOPHILIZED, FOR SOLUTION PARENTERAL at 08:04

## 2025-04-10 ENCOUNTER — CLINICAL SUPPORT (OUTPATIENT)
Dept: REHABILITATION | Facility: HOSPITAL | Age: 60
End: 2025-04-10
Payer: COMMERCIAL

## 2025-04-10 DIAGNOSIS — R52 PAIN: Primary | ICD-10-CM

## 2025-04-10 PROCEDURE — 97110 THERAPEUTIC EXERCISES: CPT | Mod: CQ

## 2025-04-10 PROCEDURE — 97112 NEUROMUSCULAR REEDUCATION: CPT | Mod: CQ

## 2025-04-10 NOTE — PROGRESS NOTES
Outpatient Rehab    Physical Therapy Visit    Patient Name: Horacio Coello  MRN: [id number]  YOB: 1965  Encounter Date: 4/10/2025    Therapy Diagnosis:   Encounter Diagnosis   Name Primary?    Pain Yes     Physician: Carol Simons DO    Physician Orders: Eval and Treat  Medical Diagnosis: Acute midline low back pain with bilateral sciatica    Visit # / Visits Authorized:  9 / 10  Insurance Authorization Period: 3/3/2025 to 6/1/2025  Date of Evaluation: 3/3/2025   Plan of Care Certification: 3/3/2025 to 5/26/25     PT/PTA: PTA   Number of PTA visits since last PT visit:3  Time In: 1600   Time Out: 1638  Total Time: 38   Total Billable Time: 30 double booked    FOTO:  Intake Score:  %  Survey Score 1:  %  Survey Score 2:  %         Subjective   No pain today, I  some things that i should have not at work..  Pain reported as 0/10.      Objective            Treatment:  Therapeutic Exercise  TE 1: Bike 5 minutes  TE 2: slant board 4 x 15 second hold  TE 3: Hamstring stretch 3 x 30 second hold  TE 4: piriformis stretch 4 x 15 second hold  TE 5: ball roll outs 5 x 5 second hold  Balance/Neuromuscular Re-Education  NMR 1: Cybe back extension 4 x 10 6#  NMR 2: Cybex hip flexion 3 x 10 3pl  NMR 3: cybex hip abduction 3 x 10 2pl  NMR 4: Cybex hip extension 3 x 10 3pl  NMR 5: Cybex quad 3pl x 30  NMR 6: CYbex leg press 3 x 10 8#    Time Entry(in minutes):  Neuromuscular Re-Education Time Entry: 15  Therapeutic Exercise Time Entry: 15    Assessment & Plan   Assessment: Patient is doing much better with overall improvement and progress toward goals but does continue to have right hip burning pain. Patient fatigue with this Cybex leg press.He is progressing well towards his goals. Progress patient as able.  Evaluation/Treatment Tolerance: Patient tolerated treatment well    Patient will continue to benefit from skilled outpatient physical therapy to address the deficits listed in the problem list box on  initial evaluation, provide pt/family education and to maximize pt's level of independence in the home and community environment.     Patient's spiritual, cultural, and educational needs considered and patient agreeable to plan of care and goals.     Education  Education was done with Patient. The patient's learning style includes Demonstration and Listening. The patient Verbalizes understanding and Demonstrates understanding.                 Plan: continue current POC    Goals:   Active       Ambulation/movement       Patient will walk ~ 30 minutes with no onset of radicular symptoms (Progressing)       Start:  03/03/25    Expected End:  04/14/25               Functional outcome       Patient will demonstrate independence in home program for support of progression (Progressing)       Start:  03/03/25    Expected End:  05/26/25               Maintaining body position       Patient will maintain forward bend/squatting position to complete work duties for full work day with 2/10 pain (Progressing)       Start:  03/03/25    Expected End:  04/14/25               Pain       Patient will report pain of 0/10 demonstrating a reduction of overall pain (Progressing)       Start:  03/03/25    Expected End:  05/26/25               Strength       Patient will achieve right hip abduction strength of 5/5 (Progressing)       Start:  03/03/25    Expected End:  05/26/25            Patient will achieve right knee extension strength of 5/5 (Progressing)       Start:  03/03/25    Expected End:  04/14/25                Jacquelyn Childs PTA

## 2025-04-11 LAB
CV STRESS BASE HR: 78 BPM
DIASTOLIC BLOOD PRESSURE: 69 MMHG
OHS CV CPX 1 MINUTE RECOVERY HEART RATE: 93 BPM
OHS CV CPX 85 PERCENT MAX PREDICTED HEART RATE MALE: 136
OHS CV CPX MAX PREDICTED HEART RATE: 160
OHS CV CPX PATIENT IS FEMALE: 0
OHS CV CPX PATIENT IS MALE: 1
OHS CV CPX PEAK DIASTOLIC BLOOD PRESSURE: 69 MMHG
OHS CV CPX PEAK HEAR RATE: 116 BPM
OHS CV CPX PEAK RATE PRESSURE PRODUCT: NORMAL
OHS CV CPX PEAK SYSTOLIC BLOOD PRESSURE: 141 MMHG
OHS CV CPX PERCENT MAX PREDICTED HEART RATE ACHIEVED: 73
OHS CV CPX RATE PRESSURE PRODUCT PRESENTING: 9828
STRESS ECHO POST EXERCISE DUR MIN: 1 MINUTES
STRESS ECHO POST EXERCISE DUR SEC: 18 SECONDS
SYSTOLIC BLOOD PRESSURE: 126 MMHG

## 2025-04-15 ENCOUNTER — CLINICAL SUPPORT (OUTPATIENT)
Dept: REHABILITATION | Facility: HOSPITAL | Age: 60
End: 2025-04-15
Payer: COMMERCIAL

## 2025-04-15 DIAGNOSIS — R52 PAIN: Primary | ICD-10-CM

## 2025-04-15 PROCEDURE — 97110 THERAPEUTIC EXERCISES: CPT | Mod: CQ

## 2025-04-15 PROCEDURE — 97112 NEUROMUSCULAR REEDUCATION: CPT | Mod: CQ

## 2025-04-15 NOTE — PROGRESS NOTES
Outpatient Rehab    Physical Therapy Visit    Patient Name: Horacio Coello  MRN: [id number]  YOB: 1965  Encounter Date: 4/15/2025    Therapy Diagnosis:   Encounter Diagnosis   Name Primary?    Pain Yes     Physician: Carol Simons DO    Physician Orders: Eval and Treat  Medical Diagnosis: Acute midline low back pain with bilateral sciatica    Visit # / Visits Authorized:  10 / 10  Insurance Authorization Period: 3/3/2025 to 6/1/2025  Date of Evaluation:  3/3/2025   Plan of Care Certification:  3/3/2025 to 5/26/25      PT/PTA: PTA   Number of PTA visits since last PT visit:4  Time In: 1600   Time Out: 1641  Total Time: 41   Total Billable Time: 30 (This patient was doubles)    FOTO:  Intake Score: 51%  Survey Score 1: 52%  Survey Score 2: 57%         Subjective   No pain today. The back is better the hip is still hurting a little..  Pain reported as 0/10.      Objective            Treatment:  Therapeutic Exercise  TE 1: Bike 5 minutes  TE 2: slant board 4 x 15 second hold  TE 3: Hamstring stretch 3 x 30 second hold  TE 4: piriformis stretch 4 x 15 second hold  TE 5: HEP was given today  Balance/Neuromuscular Re-Education  NMR 1: Cybe back extension 4 x 10 6#  NMR 2: Cybex hip flexion 3 x 10 3pl  NMR 3: cybex hip abduction 3 x 10 3pl  NMR 4: Cybex hip extension 3 x 10 3pl  NMR 5: Cybex quad 3pl x 30    Time Entry(in minutes):  Neuromuscular Re-Education Time Entry: 15  Therapeutic Exercise Time Entry: 15    Assessment & Plan   Assessment: Patient is doing much better with overall improvement and progress toward goals but does continue to have right hip burning pain.He is progressing well towards his goals. He was given an HEP today. He is now able to bend over at work to perfrom his job duties and he was unabel to do this at the start of therapy. He verbalized and demostrated understanding.  Evaluation/Treatment Tolerance: Patient tolerated treatment well    Patient will continue to benefit from  skilled outpatient physical therapy to address the deficits listed in the problem list box on initial evaluation, provide pt/family education and to maximize pt's level of independence in the home and community environment.     Patient's spiritual, cultural, and educational needs considered and patient agreeable to plan of care and goals.     Education  Education was done with Patient. The patient's learning style includes Demonstration and Listening. The patient Verbalizes understanding and Demonstrates understanding.                 Plan: continue current POC    Goals:   Active       Ambulation/movement       Patient will walk ~ 30 minutes with no onset of radicular symptoms (Progressing)       Start:  03/03/25    Expected End:  04/14/25               Strength       Patient will achieve right hip abduction strength of 5/5 (Met)       Start:  03/03/25    Expected End:  05/26/25    Resolved:  04/15/25         Patient will achieve right knee extension strength of 5/5 (Met)       Start:  03/03/25    Expected End:  04/14/25    Resolved:  04/15/25           Resolved       Functional outcome       Patient will demonstrate independence in home program for support of progression (Met)       Start:  03/03/25    Expected End:  05/26/25    Resolved:  04/15/25            Maintaining body position       Patient will maintain forward bend/squatting position to complete work duties for full work day with 2/10 pain (Met)       Start:  03/03/25    Expected End:  04/14/25    Resolved:  04/15/25            Pain       Patient will report pain of 0/10 demonstrating a reduction of overall pain (Met)       Start:  03/03/25    Expected End:  05/26/25    Resolved:  04/15/25             Jacquelyn Childs PTA

## 2025-05-01 PROBLEM — R52 PAIN: Status: RESOLVED | Noted: 2025-03-03 | Resolved: 2025-05-01

## 2025-05-05 ENCOUNTER — OFFICE VISIT (OUTPATIENT)
Dept: CARDIOLOGY | Facility: CLINIC | Age: 60
End: 2025-05-05
Payer: COMMERCIAL

## 2025-05-05 VITALS
BODY MASS INDEX: 32.84 KG/M2 | SYSTOLIC BLOOD PRESSURE: 138 MMHG | HEIGHT: 68 IN | HEART RATE: 71 BPM | DIASTOLIC BLOOD PRESSURE: 86 MMHG | OXYGEN SATURATION: 100 %

## 2025-05-05 DIAGNOSIS — G47.33 OBSTRUCTIVE SLEEP APNEA OF ADULT: ICD-10-CM

## 2025-05-05 DIAGNOSIS — I10 PRIMARY HYPERTENSION: Primary | ICD-10-CM

## 2025-05-05 DIAGNOSIS — I47.10 SVT (SUPRAVENTRICULAR TACHYCARDIA): ICD-10-CM

## 2025-05-05 DIAGNOSIS — R94.31 NONSPECIFIC ABNORMAL ELECTROCARDIOGRAM (ECG) (EKG): ICD-10-CM

## 2025-05-05 DIAGNOSIS — Z72.0 TOBACCO ABUSE DISORDER: ICD-10-CM

## 2025-05-05 PROCEDURE — 99999 PR PBB SHADOW E&M-EST. PATIENT-LVL III: CPT | Mod: PBBFAC,,, | Performed by: INTERNAL MEDICINE

## 2025-05-05 PROCEDURE — 99213 OFFICE O/P EST LOW 20 MIN: CPT | Mod: PBBFAC | Performed by: INTERNAL MEDICINE

## 2025-05-05 PROCEDURE — 4010F ACE/ARB THERAPY RXD/TAKEN: CPT | Mod: CPTII,,, | Performed by: INTERNAL MEDICINE

## 2025-05-05 PROCEDURE — 1159F MED LIST DOCD IN RCRD: CPT | Mod: CPTII,,, | Performed by: INTERNAL MEDICINE

## 2025-05-05 PROCEDURE — 3075F SYST BP GE 130 - 139MM HG: CPT | Mod: CPTII,,, | Performed by: INTERNAL MEDICINE

## 2025-05-05 PROCEDURE — 99213 OFFICE O/P EST LOW 20 MIN: CPT | Mod: S$PBB,,, | Performed by: INTERNAL MEDICINE

## 2025-05-05 PROCEDURE — 1160F RVW MEDS BY RX/DR IN RCRD: CPT | Mod: CPTII,,, | Performed by: INTERNAL MEDICINE

## 2025-05-05 PROCEDURE — 3079F DIAST BP 80-89 MM HG: CPT | Mod: CPTII,,, | Performed by: INTERNAL MEDICINE

## 2025-05-05 PROCEDURE — 3008F BODY MASS INDEX DOCD: CPT | Mod: CPTII,,, | Performed by: INTERNAL MEDICINE

## 2025-05-05 NOTE — PROGRESS NOTES
PCP: Shiela Lopez MD    Referring Provider:     Subjective:   Horacio Coello is a 60 y.o. male with hx of hypertension who presents for evaluation of abnormal EKG                           .   He underwent routine physical, found to have an abnormal EKG, stress imaging was ordered, pt presents to review results.  He denies chest pain, pressure or shortness of breath. He has undergone evaluation for back pain, diagnosed with bulging disc, undergoing PT /OT with some improvement was referred for evaluation.  He continues to smoke against medical advice, and is not compliant with CPAP.            Fhx:  Family History   Problem Relation Name Age of Onset    Diabetes Mother      Hypertension Father          Also Gout       History reviewed. No pertinent surgical history.      EKG - NSR, RBBB    ECHO -      CATH - No results found for this or any previous visit.       Stress -    NM Myocardial Perfusion Spect Multi Pharmacologic  Status: Final result     [employer] [employer] Results Release    [facility] [employer] Status: Active  Results Release     PACS Images for [employer] Viewer     Show images for NM Myocardial Perfusion Spect Multi Pharmacologic  All Reviewers List    Jean Pierre Barron DO on 4/30/2025 12:05     NM Myocardial Perfusion Spect Multi Pharmacologic  Order: [id number]   Status: Final result       Next appt: 05/22/2025 at 03:30 PM in Family Medicine (Shiela Lopez MD)       Dx: Nonspecific abnormal electrocardiogra...    Test Result Released: Yes (seen)    0 Result Notes  Details    Reading Physician Reading Date Result Priority   Connor Mondragon MD  [phone]  4/17/2025 Routine     Narrative & Impression  EXAMINATION:  NM MYOCARDIAL PERFUSION SPECT MULTI PHARM     CLINICAL HISTORY:  ECG abnormal, high CAD risk;Abnormal ECG;  Abnormal electrocardiogram (ECG) (EKG)     TECHNIQUE:  SPECT images in short, vertical and horizontal long axis were acquired 30 minutes after the injection of 10.9 mCi of Tc-99m sestamibi at  rest and 37 mCi during a cardiac stress. The clinical stress and ECG portion of the study is to be read separately.     COMPARISON:  None.     FINDINGS:  The quality of the study is compromised by GI activity adjacent to the inferior wall.     Stress SPECT images demonstrate homogenous distribution of the tracer throughout the left ventricle. On the resting images, there is matched homogenous distribution of the tracer throughout the left ventricle.     The gated post-stress images reveal normal wall motion and normal systolic wall thickening with an estimated LVEF of 73 %. The LV cavity is not dilated with an end-diastolic volume of 87 ml and an end-systolic volume of 23 ml.     TID ratio is normal 1.12     Impression:     1.  Scintigraphically negative for ischemia or infarct.  2. the global left ventricular systolic function is normal with an LV ejection fraction of 73 % and no evidence of LV dilatation. Wall motion is normal.        Electronically signed by:Connor Mondragon  Date:                                            04/17/2025  Time:                                           09:28        Exam Ended: 04/09/25 10:48 CDT Last Resulted: 04/17/25 09:28 CDT              Lab Results   Component Value Date     12/24/2024    K 3.3 (L) 12/24/2024    CL 98 12/24/2024    CO2 30 12/24/2024    BUN 13 12/24/2024    CREATININE 1.01 12/24/2024    CALCIUM 9.3 12/24/2024    ANIONGAP 13 12/24/2024    ESTGFRAFRICA 85 12/07/2021    EGFRNONAA 93 07/18/2022       Lab Results   Component Value Date    CHOL 117 09/24/2024    CHOL 145 12/05/2022    CHOL 145 11/21/2022     Lab Results   Component Value Date    HDL 31 (L) 09/24/2024    HDL 35 (L) 12/05/2022    HDL 30 (L) 11/21/2022     Lab Results   Component Value Date    LDLCALC 50 09/24/2024    LDLCALC 87 12/05/2022    LDLCALC 73 11/21/2022     Lab Results   Component Value Date    TRIG 181 (H) 09/24/2024    TRIG 113 12/05/2022    TRIG 209 (H) 11/21/2022     Lab Results    Component Value Date    CHOLHDL 3.8 09/24/2024    CHOLHDL 4.1 12/05/2022    CHOLHDL 4.8 11/21/2022       Lab Results   Component Value Date    WBC 9.77 09/24/2024    HGB 15.6 09/24/2024    HCT 47.2 09/24/2024    MCV 92.7 09/24/2024     09/24/2024           Current Outpatient Medications:     amLODIPine (NORVASC) 10 MG tablet, Take 1 tablet (10 mg total) by mouth once daily., Disp: 90 each, Rfl: 1    atorvastatin (LIPITOR) 40 MG tablet, Take 1 tablet (40 mg total) by mouth once daily., Disp: 90 tablet, Rfl: 1    hydroCHLOROthiazide (HYDRODIURIL) 25 MG tablet, Take 1 tablet (25 mg total) by mouth 2 (two) times daily., Disp: 180 tablet, Rfl: 1    losartan (COZAAR) 100 MG tablet, Take 0.5 tablets (50 mg total) by mouth once daily., Disp: 15 tablet, Rfl: 4    metoprolol succinate (TOPROL-XL) 25 MG 24 hr tablet, Take 1 tablet (25 mg total) by mouth once daily., Disp: 90 tablet, Rfl: 0    diclofenac sodium (VOLTAREN ARTHRITIS PAIN) 1 % Gel, Apply 2 g topically 4 (four) times daily as needed. (Patient not taking: Reported on 5/5/2025), Disp: 1 each, Rfl: 2    Review of Systems   Constitutional: Negative for diaphoresis, malaise/fatigue, night sweats and weight gain.   HENT:  Negative for congestion, ear pain, hearing loss, nosebleeds and sore throat.    Eyes:  Negative for blurred vision, double vision, pain, photophobia and visual disturbance.   Cardiovascular:  Negative for chest pain, claudication, dyspnea on exertion, irregular heartbeat, leg swelling, near-syncope, orthopnea, palpitations and syncope.   Respiratory:  Positive for sleep disturbances due to breathing and snoring. Negative for cough, shortness of breath and wheezing.    Endocrine: Negative for cold intolerance, heat intolerance, polydipsia, polyphagia and polyuria.   Hematologic/Lymphatic: Negative for bleeding problem. Does not bruise/bleed easily.   Skin:  Negative for dry skin, flushing, itching, rash and skin cancer.   Musculoskeletal:   "Negative for arthritis, back pain, falls, joint pain, muscle cramps, muscle weakness and myalgias.   Gastrointestinal:  Negative for abdominal pain, change in bowel habit, constipation, diarrhea, dysphagia, heartburn, nausea and vomiting.   Genitourinary:  Negative for bladder incontinence, dysuria, flank pain, frequency and nocturia.   Neurological:  Negative for dizziness, focal weakness, headaches, light-headedness, loss of balance, numbness, paresthesias and seizures.   Psychiatric/Behavioral:  Negative for depression, memory loss and substance abuse. The patient is not nervous/anxious.    Allergic/Immunologic: Negative for environmental allergies.          Objective:   /86 (BP Location: Left arm, Patient Position: Sitting)   Pulse 71   Ht 5' 8" (1.727 m)   SpO2 100%   BMI 32.84 kg/m²       Physical Exam  Vitals and nursing note reviewed.   Constitutional:       Appearance: Normal appearance. He is obese.   HENT:      Head: Normocephalic and atraumatic.      Right Ear: External ear normal.      Left Ear: External ear normal.   Eyes:      General: No scleral icterus.        Right eye: No discharge.         Left eye: No discharge.      Extraocular Movements: Extraocular movements intact.      Conjunctiva/sclera: Conjunctivae normal.      Pupils: Pupils are equal, round, and reactive to light.   Cardiovascular:      Rate and Rhythm: Normal rate and regular rhythm.      Pulses: Normal pulses.      Heart sounds: Normal heart sounds. No murmur heard.     No friction rub. No gallop.   Pulmonary:      Effort: Pulmonary effort is normal.      Breath sounds: Normal breath sounds. No wheezing, rhonchi or rales.   Chest:      Chest wall: No tenderness.   Abdominal:      General: Abdomen is flat. Bowel sounds are normal. There is no distension.      Palpations: Abdomen is soft.      Tenderness: There is no abdominal tenderness. There is no guarding or rebound.   Musculoskeletal:         General: No swelling or " tenderness. Normal range of motion.      Cervical back: Normal range of motion and neck supple.   Skin:     General: Skin is warm and dry.      Findings: No erythema or rash.   Neurological:      General: No focal deficit present.      Mental Status: He is alert and oriented to person, place, and time.      Cranial Nerves: No cranial nerve deficit.      Motor: No weakness.      Gait: Gait normal.   Psychiatric:         Mood and Affect: Mood normal.         Behavior: Behavior normal.         Thought Content: Thought content normal.         Judgment: Judgment normal.         Assessment:     1. Primary hypertension      controlled on current medications      2. SVT (supraventricular tachycardia)      Pt denies hx of SVT, no symptoms consistent with this diagnosis      3. Nonspecific abnormal electrocardiogram (ECG) (EKG)      Stress imaging does not demonstrate previous scar or inducible ischemia, continue risk factor modiification, lifestyle changes.      4. Obstructive sleep apnea of adult      non-compliant with CPAP, refer to sleep medicine      5. Tobacco abuse disorder      recommend smoking cessation, declines pharmacologic support            Plan:   Negative lexiscan  cardiolyte, encourage  smoking cessaton, refer to sleep medicine for recommendations of tx for JORGE, follow up in one year, sooner if symptoms change

## 2025-05-22 ENCOUNTER — OFFICE VISIT (OUTPATIENT)
Dept: FAMILY MEDICINE | Facility: CLINIC | Age: 60
End: 2025-05-22
Payer: COMMERCIAL

## 2025-05-22 VITALS
HEART RATE: 90 BPM | TEMPERATURE: 99 F | OXYGEN SATURATION: 99 % | HEIGHT: 68 IN | WEIGHT: 218 LBS | DIASTOLIC BLOOD PRESSURE: 89 MMHG | RESPIRATION RATE: 18 BRPM | SYSTOLIC BLOOD PRESSURE: 156 MMHG | BODY MASS INDEX: 33.04 KG/M2

## 2025-05-22 DIAGNOSIS — R00.0 TACHYCARDIA: ICD-10-CM

## 2025-05-22 DIAGNOSIS — E78.2 MIXED HYPERLIPIDEMIA: Chronic | ICD-10-CM

## 2025-05-22 DIAGNOSIS — I10 HYPERTENSION, UNSPECIFIED TYPE: ICD-10-CM

## 2025-05-22 DIAGNOSIS — I10 PRIMARY HYPERTENSION: Primary | Chronic | ICD-10-CM

## 2025-05-22 DIAGNOSIS — I10 ESSENTIAL HYPERTENSION: ICD-10-CM

## 2025-05-22 DIAGNOSIS — I47.10 SVT (SUPRAVENTRICULAR TACHYCARDIA): ICD-10-CM

## 2025-05-22 RX ORDER — AMLODIPINE BESYLATE 10 MG/1
10 TABLET ORAL DAILY
Qty: 90 TABLET | Refills: 0 | Status: SHIPPED | OUTPATIENT
Start: 2025-05-22 | End: 2025-08-20

## 2025-05-22 RX ORDER — ATORVASTATIN CALCIUM 40 MG/1
40 TABLET, FILM COATED ORAL DAILY
Qty: 90 TABLET | Refills: 0 | Status: SHIPPED | OUTPATIENT
Start: 2025-05-22 | End: 2025-08-20

## 2025-05-22 RX ORDER — LOSARTAN POTASSIUM 100 MG/1
50 TABLET ORAL DAILY
Qty: 45 TABLET | Refills: 0 | Status: SHIPPED | OUTPATIENT
Start: 2025-05-22 | End: 2025-08-20

## 2025-05-22 RX ORDER — METOPROLOL SUCCINATE 25 MG/1
25 TABLET, EXTENDED RELEASE ORAL DAILY
Qty: 90 TABLET | Refills: 0 | Status: SHIPPED | OUTPATIENT
Start: 2025-05-22 | End: 2025-08-20

## 2025-05-22 RX ORDER — HYDROCHLOROTHIAZIDE 25 MG/1
25 TABLET ORAL 2 TIMES DAILY
Qty: 180 TABLET | Refills: 0 | Status: SHIPPED | OUTPATIENT
Start: 2025-05-22 | End: 2025-08-20

## 2025-06-18 ENCOUNTER — RESULTS FOLLOW-UP (OUTPATIENT)
Dept: FAMILY MEDICINE | Facility: CLINIC | Age: 60
End: 2025-06-18

## 2025-08-05 ENCOUNTER — PATIENT MESSAGE (OUTPATIENT)
Facility: HOSPITAL | Age: 60
End: 2025-08-05
Payer: COMMERCIAL

## 2025-08-06 ENCOUNTER — PATIENT MESSAGE (OUTPATIENT)
Facility: HOSPITAL | Age: 60
End: 2025-08-06
Payer: COMMERCIAL

## 2025-08-19 ENCOUNTER — OFFICE VISIT (OUTPATIENT)
Dept: FAMILY MEDICINE | Facility: CLINIC | Age: 60
End: 2025-08-19
Payer: COMMERCIAL

## 2025-08-19 VITALS
DIASTOLIC BLOOD PRESSURE: 77 MMHG | BODY MASS INDEX: 32.89 KG/M2 | TEMPERATURE: 98 F | SYSTOLIC BLOOD PRESSURE: 139 MMHG | RESPIRATION RATE: 16 BRPM | WEIGHT: 217 LBS | HEART RATE: 90 BPM | HEIGHT: 68 IN | OXYGEN SATURATION: 100 %

## 2025-08-19 DIAGNOSIS — I73.9 CLAUDICATION: ICD-10-CM

## 2025-08-19 DIAGNOSIS — I10 HYPERTENSION, UNSPECIFIED TYPE: ICD-10-CM

## 2025-08-19 DIAGNOSIS — I73.9 CLAUDICATION OF BOTH LOWER EXTREMITIES: ICD-10-CM

## 2025-08-19 DIAGNOSIS — G89.29 CHRONIC BILATERAL LOW BACK PAIN WITHOUT SCIATICA: ICD-10-CM

## 2025-08-19 DIAGNOSIS — F17.200 CURRENT SMOKER: ICD-10-CM

## 2025-08-19 DIAGNOSIS — E78.5 DYSLIPIDEMIA: Primary | ICD-10-CM

## 2025-08-19 DIAGNOSIS — M54.50 CHRONIC BILATERAL LOW BACK PAIN WITHOUT SCIATICA: ICD-10-CM

## 2025-08-19 DIAGNOSIS — E78.2 MIXED HYPERLIPIDEMIA: Chronic | ICD-10-CM

## 2025-08-19 PROCEDURE — 1159F MED LIST DOCD IN RCRD: CPT | Mod: CPTII,,, | Performed by: SPECIALIST

## 2025-08-19 PROCEDURE — 3008F BODY MASS INDEX DOCD: CPT | Mod: CPTII,,, | Performed by: SPECIALIST

## 2025-08-19 PROCEDURE — 99213 OFFICE O/P EST LOW 20 MIN: CPT | Mod: GC,,, | Performed by: SPECIALIST

## 2025-08-19 PROCEDURE — 4010F ACE/ARB THERAPY RXD/TAKEN: CPT | Mod: CPTII,,, | Performed by: SPECIALIST

## 2025-08-19 PROCEDURE — 3075F SYST BP GE 130 - 139MM HG: CPT | Mod: CPTII,,, | Performed by: SPECIALIST

## 2025-08-19 PROCEDURE — 3078F DIAST BP <80 MM HG: CPT | Mod: CPTII,,, | Performed by: SPECIALIST

## 2025-08-19 RX ORDER — ATORVASTATIN CALCIUM 40 MG/1
40 TABLET, FILM COATED ORAL DAILY
Qty: 90 TABLET | Refills: 0 | Status: SHIPPED | OUTPATIENT
Start: 2025-08-19 | End: 2025-11-17

## 2025-08-19 RX ORDER — LOSARTAN POTASSIUM 50 MG/1
50 TABLET ORAL DAILY
COMMUNITY
Start: 2025-07-07 | End: 2025-08-19 | Stop reason: SDUPTHER

## 2025-08-19 RX ORDER — LOSARTAN POTASSIUM 50 MG/1
50 TABLET ORAL 2 TIMES DAILY
Qty: 60 TABLET | Refills: 2 | Status: SHIPPED | OUTPATIENT
Start: 2025-08-19 | End: 2025-11-17

## 2025-08-19 RX ORDER — LATANOPROST/PF 0.005 %
DROPS OPHTHALMIC (EYE)
COMMUNITY

## 2025-08-19 RX ORDER — NAPROXEN 500 MG/1
500 TABLET ORAL 2 TIMES DAILY
Qty: 28 TABLET | Refills: 0 | Status: SHIPPED | OUTPATIENT
Start: 2025-08-19 | End: 2025-09-02

## 2025-08-19 RX ORDER — LOSARTAN POTASSIUM 100 MG/1
50 TABLET ORAL DAILY
Qty: 45 TABLET | Refills: 0 | Status: CANCELLED | OUTPATIENT
Start: 2025-08-19 | End: 2025-11-17

## 2025-08-25 ENCOUNTER — PATIENT MESSAGE (OUTPATIENT)
Facility: HOSPITAL | Age: 60
End: 2025-08-25
Payer: COMMERCIAL